# Patient Record
Sex: FEMALE | Race: BLACK OR AFRICAN AMERICAN | NOT HISPANIC OR LATINO
[De-identification: names, ages, dates, MRNs, and addresses within clinical notes are randomized per-mention and may not be internally consistent; named-entity substitution may affect disease eponyms.]

---

## 2017-10-27 ENCOUNTER — APPOINTMENT (OUTPATIENT)
Dept: OTHER | Facility: CLINIC | Age: 67
End: 2017-10-27
Payer: COMMERCIAL

## 2017-10-27 VITALS
BODY MASS INDEX: 40.02 KG/M2 | DIASTOLIC BLOOD PRESSURE: 71 MMHG | HEART RATE: 61 BPM | WEIGHT: 255 LBS | RESPIRATION RATE: 15 BRPM | SYSTOLIC BLOOD PRESSURE: 131 MMHG | HEIGHT: 67 IN | OXYGEN SATURATION: 99 %

## 2017-10-27 DIAGNOSIS — Z86.79 PERSONAL HISTORY OF OTHER DISEASES OF THE CIRCULATORY SYSTEM: ICD-10-CM

## 2017-10-27 DIAGNOSIS — N18.3 CHRONIC KIDNEY DISEASE, STAGE 3 (MODERATE): ICD-10-CM

## 2017-10-27 DIAGNOSIS — Z80.3 FAMILY HISTORY OF MALIGNANT NEOPLASM OF BREAST: ICD-10-CM

## 2017-10-27 DIAGNOSIS — Z82.49 FAMILY HISTORY OF ISCHEMIC HEART DISEASE AND OTHER DISEASES OF THE CIRCULATORY SYSTEM: ICD-10-CM

## 2017-10-27 DIAGNOSIS — Z80.6 FAMILY HISTORY OF LEUKEMIA: ICD-10-CM

## 2017-10-27 DIAGNOSIS — M51.9 UNSPECIFIED THORACIC, THORACOLUMBAR AND LUMBOSACRAL INTERVERTEBRAL DISC DISORDER: ICD-10-CM

## 2017-10-27 DIAGNOSIS — Z82.3 FAMILY HISTORY OF STROKE: ICD-10-CM

## 2017-10-27 DIAGNOSIS — Z78.9 OTHER SPECIFIED HEALTH STATUS: ICD-10-CM

## 2017-10-27 PROCEDURE — 94010 BREATHING CAPACITY TEST: CPT

## 2017-10-27 PROCEDURE — 96150: CPT

## 2017-10-27 PROCEDURE — 99397 PER PM REEVAL EST PAT 65+ YR: CPT

## 2017-10-30 LAB
ALBUMIN SERPL ELPH-MCNC: 4.1 G/DL
ALP BLD-CCNC: 100 U/L
ALT SERPL-CCNC: 14 U/L
ANION GAP SERPL CALC-SCNC: 16 MMOL/L
APPEARANCE: CLEAR
AST SERPL-CCNC: 16 U/L
BASOPHILS # BLD AUTO: 0.02 K/UL
BASOPHILS NFR BLD AUTO: 0.2 %
BILIRUB SERPL-MCNC: 0.4 MG/DL
BILIRUBIN URINE: NEGATIVE
BLOOD URINE: NEGATIVE
BUN SERPL-MCNC: 15 MG/DL
CALCIUM SERPL-MCNC: 10 MG/DL
CHLORIDE SERPL-SCNC: 103 MMOL/L
CHOLEST SERPL-MCNC: 152 MG/DL
CHOLEST/HDLC SERPL: 2.8 RATIO
CO2 SERPL-SCNC: 25 MMOL/L
COLOR: YELLOW
CREAT SERPL-MCNC: 1.08 MG/DL
EOSINOPHIL # BLD AUTO: 0.31 K/UL
EOSINOPHIL NFR BLD AUTO: 3.8 %
GLUCOSE QUALITATIVE U: NEGATIVE MG/DL
GLUCOSE SERPL-MCNC: 86 MG/DL
HCT VFR BLD CALC: 36.9 %
HDLC SERPL-MCNC: 54 MG/DL
HGB BLD-MCNC: 11.8 G/DL
IMM GRANULOCYTES NFR BLD AUTO: 0.2 %
KETONES URINE: NEGATIVE
LDLC SERPL CALC-MCNC: 81 MG/DL
LEUKOCYTE ESTERASE URINE: ABNORMAL
LYMPHOCYTES # BLD AUTO: 2.02 K/UL
LYMPHOCYTES NFR BLD AUTO: 25 %
MAN DIFF?: NORMAL
MCHC RBC-ENTMCNC: 27.9 PG
MCHC RBC-ENTMCNC: 32 GM/DL
MCV RBC AUTO: 87.2 FL
MONOCYTES # BLD AUTO: 0.44 K/UL
MONOCYTES NFR BLD AUTO: 5.4 %
NEUTROPHILS # BLD AUTO: 5.27 K/UL
NEUTROPHILS NFR BLD AUTO: 65.4 %
NITRITE URINE: NEGATIVE
PH URINE: 5.5
PLATELET # BLD AUTO: 285 K/UL
POTASSIUM SERPL-SCNC: 4.1 MMOL/L
PROT SERPL-MCNC: 7.4 G/DL
PROTEIN URINE: NEGATIVE MG/DL
RBC # BLD: 4.23 M/UL
RBC # FLD: 14.5 %
SODIUM SERPL-SCNC: 144 MMOL/L
SPECIFIC GRAVITY URINE: 1.02
TRIGL SERPL-MCNC: 87 MG/DL
UROBILINOGEN URINE: NEGATIVE MG/DL
WBC # FLD AUTO: 8.08 K/UL

## 2018-01-02 ENCOUNTER — APPOINTMENT (OUTPATIENT)
Dept: ENDOCRINOLOGY | Facility: CLINIC | Age: 68
End: 2018-01-02

## 2018-01-16 ENCOUNTER — APPOINTMENT (OUTPATIENT)
Dept: ENDOCRINOLOGY | Facility: CLINIC | Age: 68
End: 2018-01-16
Payer: MEDICARE

## 2018-01-16 VITALS
WEIGHT: 261 LBS | SYSTOLIC BLOOD PRESSURE: 117 MMHG | DIASTOLIC BLOOD PRESSURE: 61 MMHG | HEART RATE: 60 BPM | BODY MASS INDEX: 41.95 KG/M2 | HEIGHT: 66 IN

## 2018-01-16 PROCEDURE — 99202 OFFICE O/P NEW SF 15 MIN: CPT

## 2018-01-25 LAB
24R-OH-CALCIDIOL SERPL-MCNC: 10.8 PG/ML
25(OH)D3 SERPL-MCNC: 35 NG/ML
ALBUMIN SERPL ELPH-MCNC: 4 G/DL
ALP BLD-CCNC: 98 U/L
ALT SERPL-CCNC: 9 U/L
ANION GAP SERPL CALC-SCNC: 17 MMOL/L
AST SERPL-CCNC: 14 U/L
BASOPHILS # BLD AUTO: 0.02 K/UL
BASOPHILS NFR BLD AUTO: 0.2 %
BILIRUB SERPL-MCNC: 0.4 MG/DL
BUN SERPL-MCNC: 17 MG/DL
CALCIUM SERPL-MCNC: 10 MG/DL
CALCIUM SERPL-MCNC: 10 MG/DL
CHLORIDE SERPL-SCNC: 101 MMOL/L
CHOLEST SERPL-MCNC: 141 MG/DL
CHOLEST/HDLC SERPL: 2.5 RATIO
CO2 SERPL-SCNC: 24 MMOL/L
CREAT SERPL-MCNC: 1.19 MG/DL
EOSINOPHIL # BLD AUTO: 0.24 K/UL
EOSINOPHIL NFR BLD AUTO: 2.7 %
GLUCOSE SERPL-MCNC: 94 MG/DL
HBA1C MFR BLD HPLC: 5.4 %
HCT VFR BLD CALC: 37.6 %
HDLC SERPL-MCNC: 57 MG/DL
HGB BLD-MCNC: 11.9 G/DL
IMM GRANULOCYTES NFR BLD AUTO: 0.1 %
LDLC SERPL CALC-MCNC: 70 MG/DL
LYMPHOCYTES # BLD AUTO: 1.69 K/UL
LYMPHOCYTES NFR BLD AUTO: 19.2 %
MAN DIFF?: NORMAL
MCHC RBC-ENTMCNC: 27.9 PG
MCHC RBC-ENTMCNC: 31.6 GM/DL
MCV RBC AUTO: 88.3 FL
MONOCYTES # BLD AUTO: 0.53 K/UL
MONOCYTES NFR BLD AUTO: 6 %
NEUTROPHILS # BLD AUTO: 6.3 K/UL
NEUTROPHILS NFR BLD AUTO: 71.8 %
PARATHYROID HORMONE INTACT: 65 PG/ML
PLATELET # BLD AUTO: 294 K/UL
POTASSIUM SERPL-SCNC: 4.2 MMOL/L
PROT SERPL-MCNC: 7.3 G/DL
RBC # BLD: 4.26 M/UL
RBC # FLD: 14.5 %
SODIUM SERPL-SCNC: 142 MMOL/L
T3FREE SERPL-MCNC: 2.67 PG/ML
T4 FREE SERPL-MCNC: 1.6 NG/DL
TRIGL SERPL-MCNC: 72 MG/DL
WBC # FLD AUTO: 8.79 K/UL

## 2018-10-29 ENCOUNTER — RESULT CHARGE (OUTPATIENT)
Age: 68
End: 2018-10-29

## 2018-10-30 ENCOUNTER — APPOINTMENT (OUTPATIENT)
Dept: OTHER | Facility: CLINIC | Age: 68
End: 2018-10-30
Payer: COMMERCIAL

## 2018-10-30 VITALS
HEART RATE: 57 BPM | HEIGHT: 66 IN | SYSTOLIC BLOOD PRESSURE: 131 MMHG | BODY MASS INDEX: 41.95 KG/M2 | DIASTOLIC BLOOD PRESSURE: 73 MMHG | RESPIRATION RATE: 16 BRPM | WEIGHT: 261 LBS

## 2018-10-30 PROCEDURE — 96150: CPT

## 2018-10-30 PROCEDURE — 99396 PREV VISIT EST AGE 40-64: CPT

## 2018-10-30 PROCEDURE — 94010 BREATHING CAPACITY TEST: CPT

## 2018-10-31 LAB
ALBUMIN SERPL ELPH-MCNC: 4.2 G/DL
ALP BLD-CCNC: 97 U/L
ALT SERPL-CCNC: 12 U/L
ANION GAP SERPL CALC-SCNC: 16 MMOL/L
APPEARANCE: ABNORMAL
AST SERPL-CCNC: 18 U/L
BACTERIA: ABNORMAL
BASOPHILS # BLD AUTO: 0.04 K/UL
BASOPHILS NFR BLD AUTO: 0.5 %
BILIRUB SERPL-MCNC: 0.4 MG/DL
BILIRUBIN URINE: NEGATIVE
BLOOD URINE: NEGATIVE
BUN SERPL-MCNC: 21 MG/DL
CALCIUM SERPL-MCNC: 9.6 MG/DL
CHLORIDE SERPL-SCNC: 102 MMOL/L
CHOLEST SERPL-MCNC: 146 MG/DL
CHOLEST/HDLC SERPL: 2.8 RATIO
CO2 SERPL-SCNC: 24 MMOL/L
COLOR: ABNORMAL
CREAT SERPL-MCNC: 1.07 MG/DL
EOSINOPHIL # BLD AUTO: 0.27 K/UL
EOSINOPHIL NFR BLD AUTO: 3.2 %
GLUCOSE QUALITATIVE U: NEGATIVE MG/DL
GLUCOSE SERPL-MCNC: 85 MG/DL
HCT VFR BLD CALC: 38.7 %
HDLC SERPL-MCNC: 53 MG/DL
HGB BLD-MCNC: 12.2 G/DL
HYALINE CASTS: 2 /LPF
IMM GRANULOCYTES NFR BLD AUTO: 0.1 %
KETONES URINE: ABNORMAL
LDLC SERPL CALC-MCNC: 75 MG/DL
LEUKOCYTE ESTERASE URINE: ABNORMAL
LYMPHOCYTES # BLD AUTO: 1.89 K/UL
LYMPHOCYTES NFR BLD AUTO: 22.4 %
MAN DIFF?: NORMAL
MCHC RBC-ENTMCNC: 28.6 PG
MCHC RBC-ENTMCNC: 31.5 GM/DL
MCV RBC AUTO: 90.6 FL
MICROSCOPIC-UA: NORMAL
MONOCYTES # BLD AUTO: 0.52 K/UL
MONOCYTES NFR BLD AUTO: 6.2 %
NEUTROPHILS # BLD AUTO: 5.72 K/UL
NEUTROPHILS NFR BLD AUTO: 67.6 %
NITRITE URINE: NEGATIVE
PH URINE: 5.5
PLATELET # BLD AUTO: 296 K/UL
POTASSIUM SERPL-SCNC: 3.9 MMOL/L
PROT SERPL-MCNC: 7.1 G/DL
PROTEIN URINE: 30 MG/DL
RBC # BLD: 4.27 M/UL
RBC # FLD: 14.6 %
RED BLOOD CELLS URINE: 3 /HPF
SODIUM SERPL-SCNC: 142 MMOL/L
SPECIFIC GRAVITY URINE: 1.02
SQUAMOUS EPITHELIAL CELLS: 8 /HPF
TRIGL SERPL-MCNC: 88 MG/DL
UROBILINOGEN URINE: NEGATIVE MG/DL
WBC # FLD AUTO: 8.45 K/UL
WHITE BLOOD CELLS URINE: 7 /HPF

## 2019-02-28 ENCOUNTER — FORM ENCOUNTER (OUTPATIENT)
Age: 69
End: 2019-02-28

## 2019-03-19 ENCOUNTER — APPOINTMENT (OUTPATIENT)
Dept: GASTROENTEROLOGY | Facility: CLINIC | Age: 69
End: 2019-03-19
Payer: COMMERCIAL

## 2019-03-19 VITALS
SYSTOLIC BLOOD PRESSURE: 138 MMHG | WEIGHT: 256 LBS | HEIGHT: 66 IN | TEMPERATURE: 98.4 F | DIASTOLIC BLOOD PRESSURE: 74 MMHG | OXYGEN SATURATION: 96 % | BODY MASS INDEX: 41.14 KG/M2 | HEART RATE: 75 BPM

## 2019-03-19 PROCEDURE — 99203 OFFICE O/P NEW LOW 30 MIN: CPT

## 2019-03-19 NOTE — PHYSICAL EXAM
[General Appearance - Alert] : alert [General Appearance - In No Acute Distress] : in no acute distress [Sclera] : the sclera and conjunctiva were normal [Extraocular Movements] : extraocular movements were intact [PERRL With Normal Accommodation] : pupils were equal in size, round, and reactive to light [Outer Ear] : the ears and nose were normal in appearance [Oropharynx] : the oropharynx was normal [Neck Appearance] : the appearance of the neck was normal [Neck Cervical Mass (___cm)] : no neck mass was observed [Jugular Venous Distention Increased] : there was no jugular-venous distention [Thyroid Diffuse Enlargement] : the thyroid was not enlarged [Thyroid Nodule] : there were no palpable thyroid nodules [Auscultation Breath Sounds / Voice Sounds] : lungs were clear to auscultation bilaterally [Heart Rate And Rhythm] : heart rate was normal and rhythm regular [Heart Sounds] : normal S1 and S2 [Heart Sounds Gallop] : no gallops [Murmurs] : no murmurs [Heart Sounds Pericardial Friction Rub] : no pericardial rub [Bowel Sounds] : normal bowel sounds [Abdomen Soft] : soft [Abdomen Tenderness] : non-tender [Abdomen Mass (___ Cm)] : no abdominal mass palpated [Cervical Lymph Nodes Enlarged Posterior Bilaterally] : posterior cervical [Cervical Lymph Nodes Enlarged Anterior Bilaterally] : anterior cervical [Supraclavicular Lymph Nodes Enlarged Bilaterally] : supraclavicular [No Spinal Tenderness] : no spinal tenderness [] : no rash [Nail Clubbing] : no clubbing  or cyanosis of the fingernails [Oriented To Time, Place, And Person] : oriented to person, place, and time [Impaired Insight] : insight and judgment were intact [Affect] : the affect was normal

## 2019-03-19 NOTE — ASSESSMENT
[FreeTextEntry1] : 68 year female presents for colorectal cancer screening. She prefer to have a cologuard test. I explained that if Cologaurd is positive then she will need a colonoscopy. \par \par Plan:\par Cologuard test \par Further management pending those results.

## 2019-03-19 NOTE — HISTORY OF PRESENT ILLNESS
[de-identified] : Referring Physician: SELF,REFERRED \par 68 year  old   female  with  a past medical history as below presents for evaluation for screening for colorectal cancer. She prefers to have a cologuard testing. She has no history of colon cancer, colitis, or polyps. No family history of colon cancer. \par ROS: She  denies headache, rash, eye pain, joint pain, weight loss, fever, chills, night sweats, chest pain, dyspnea, cough, diarrhea, constipation, melena, abdominal pain, nausea and vomiting. \par MHx: HTN , RA \par SHx:  Hysterectomy and hernia repair \par Significant Meds: Denies ASA or AC. \par Allergies: \par Family history: Denies a family history of colon CA, gastric CA, high risk polyps, Inflammatory and autoimmune disease. \par Social: Denies TOB, ETOH, IVDA. No Tattoos \par Health maintenance: -ve history of blood transfusions, \par Last colonoscopy:        3 years ago          Last EGD :     \par \par

## 2019-04-17 ENCOUNTER — OTHER (OUTPATIENT)
Age: 69
End: 2019-04-17

## 2019-07-01 ENCOUNTER — FORM ENCOUNTER (OUTPATIENT)
Age: 69
End: 2019-07-01

## 2019-08-13 ENCOUNTER — OTHER (OUTPATIENT)
Age: 69
End: 2019-08-13

## 2019-10-31 ENCOUNTER — APPOINTMENT (OUTPATIENT)
Dept: OTHER | Facility: CLINIC | Age: 69
End: 2019-10-31
Payer: COMMERCIAL

## 2019-10-31 VITALS
WEIGHT: 265 LBS | BODY MASS INDEX: 42.59 KG/M2 | SYSTOLIC BLOOD PRESSURE: 105 MMHG | DIASTOLIC BLOOD PRESSURE: 65 MMHG | HEIGHT: 66 IN | HEART RATE: 60 BPM | OXYGEN SATURATION: 100 % | RESPIRATION RATE: 16 BRPM

## 2019-10-31 DIAGNOSIS — Z28.21 IMMUNIZATION NOT CARRIED OUT BECAUSE OF PATIENT REFUSAL: ICD-10-CM

## 2019-10-31 DIAGNOSIS — Z03.89 ENCOUNTER FOR OBSERVATION FOR OTHER SUSPECTED DISEASES AND CONDITIONS RULED OUT: ICD-10-CM

## 2019-10-31 PROCEDURE — 94010 BREATHING CAPACITY TEST: CPT

## 2019-10-31 PROCEDURE — 99396 PREV VISIT EST AGE 40-64: CPT | Mod: 25

## 2019-11-04 LAB
APPEARANCE: ABNORMAL
BACTERIA: ABNORMAL
BILIRUBIN URINE: NEGATIVE
BLOOD URINE: NEGATIVE
COLOR: NORMAL
GLUCOSE QUALITATIVE U: NEGATIVE
HYALINE CASTS: 2 /LPF
KETONES URINE: NEGATIVE
LEUKOCYTE ESTERASE URINE: NEGATIVE
MICROSCOPIC-UA: NORMAL
NITRITE URINE: NEGATIVE
PH URINE: 6.5
PROTEIN URINE: NEGATIVE
RED BLOOD CELLS URINE: 1 /HPF
SPECIFIC GRAVITY URINE: 1.01
SQUAMOUS EPITHELIAL CELLS: 1 /HPF
UROBILINOGEN URINE: NORMAL
WHITE BLOOD CELLS URINE: 1 /HPF

## 2019-11-05 LAB
ALBUMIN SERPL ELPH-MCNC: 3.9 G/DL
ALP BLD-CCNC: 98 U/L
ALT SERPL-CCNC: 15 U/L
ANION GAP SERPL CALC-SCNC: 13 MMOL/L
AST SERPL-CCNC: 17 U/L
BASOPHILS # BLD AUTO: 0.06 K/UL
BASOPHILS NFR BLD AUTO: 0.8 %
BILIRUB SERPL-MCNC: 0.4 MG/DL
BUN SERPL-MCNC: 22 MG/DL
CALCIUM SERPL-MCNC: 9.7 MG/DL
CHLORIDE SERPL-SCNC: 106 MMOL/L
CHOLEST SERPL-MCNC: 139 MG/DL
CHOLEST/HDLC SERPL: 2.8 RATIO
CO2 SERPL-SCNC: 24 MMOL/L
CREAT SERPL-MCNC: 1.24 MG/DL
EOSINOPHIL # BLD AUTO: 0.24 K/UL
EOSINOPHIL NFR BLD AUTO: 3.2 %
GLUCOSE SERPL-MCNC: 92 MG/DL
HCT VFR BLD CALC: 37.8 %
HDLC SERPL-MCNC: 50 MG/DL
HGB BLD-MCNC: 11.8 G/DL
IMM GRANULOCYTES NFR BLD AUTO: 0.3 %
LDLC SERPL CALC-MCNC: 71 MG/DL
LYMPHOCYTES # BLD AUTO: 1.6 K/UL
LYMPHOCYTES NFR BLD AUTO: 21.6 %
MAN DIFF?: NORMAL
MCHC RBC-ENTMCNC: 28.8 PG
MCHC RBC-ENTMCNC: 31.2 GM/DL
MCV RBC AUTO: 92.2 FL
MONOCYTES # BLD AUTO: 0.57 K/UL
MONOCYTES NFR BLD AUTO: 7.7 %
NEUTROPHILS # BLD AUTO: 4.91 K/UL
NEUTROPHILS NFR BLD AUTO: 66.4 %
PLATELET # BLD AUTO: 253 K/UL
POTASSIUM SERPL-SCNC: 3.9 MMOL/L
PROT SERPL-MCNC: 6.8 G/DL
RBC # BLD: 4.1 M/UL
RBC # FLD: 14.3 %
SODIUM SERPL-SCNC: 143 MMOL/L
TRIGL SERPL-MCNC: 91 MG/DL
WBC # FLD AUTO: 7.4 K/UL

## 2019-11-27 PROBLEM — Z28.21 INFLUENZA VACCINATION DECLINED: Status: ACTIVE | Noted: 2018-10-30

## 2020-01-07 ENCOUNTER — APPOINTMENT (OUTPATIENT)
Dept: GASTROENTEROLOGY | Facility: CLINIC | Age: 70
End: 2020-01-07

## 2020-01-13 ENCOUNTER — APPOINTMENT (OUTPATIENT)
Dept: GASTROENTEROLOGY | Facility: CLINIC | Age: 70
End: 2020-01-13

## 2020-01-15 ENCOUNTER — EMERGENCY (EMERGENCY)
Facility: HOSPITAL | Age: 70
LOS: 1 days | Discharge: HOME | End: 2020-01-15
Attending: EMERGENCY MEDICINE | Admitting: EMERGENCY MEDICINE
Payer: MEDICARE

## 2020-01-15 VITALS
SYSTOLIC BLOOD PRESSURE: 109 MMHG | RESPIRATION RATE: 16 BRPM | OXYGEN SATURATION: 100 % | HEART RATE: 66 BPM | TEMPERATURE: 98 F | DIASTOLIC BLOOD PRESSURE: 49 MMHG

## 2020-01-15 VITALS
HEART RATE: 66 BPM | OXYGEN SATURATION: 100 % | SYSTOLIC BLOOD PRESSURE: 154 MMHG | DIASTOLIC BLOOD PRESSURE: 67 MMHG | TEMPERATURE: 98 F | RESPIRATION RATE: 20 BRPM

## 2020-01-15 PROCEDURE — 73502 X-RAY EXAM HIP UNI 2-3 VIEWS: CPT | Mod: 26,RT

## 2020-01-15 PROCEDURE — 99283 EMERGENCY DEPT VISIT LOW MDM: CPT | Mod: GC

## 2020-01-15 RX ORDER — OXYCODONE HYDROCHLORIDE 5 MG/1
1 TABLET ORAL
Qty: 8 | Refills: 0
Start: 2020-01-15 | End: 2020-01-18

## 2020-01-15 RX ORDER — OXYCODONE HYDROCHLORIDE 5 MG/1
5 TABLET ORAL ONCE
Refills: 0 | Status: DISCONTINUED | OUTPATIENT
Start: 2020-01-15 | End: 2020-01-15

## 2020-01-15 RX ORDER — ACETAMINOPHEN 500 MG
975 TABLET ORAL ONCE
Refills: 0 | Status: COMPLETED | OUTPATIENT
Start: 2020-01-15 | End: 2020-01-15

## 2020-01-15 RX ADMIN — Medication 975 MILLIGRAM(S): at 03:20

## 2020-01-15 RX ADMIN — OXYCODONE HYDROCHLORIDE 5 MILLIGRAM(S): 5 TABLET ORAL at 04:47

## 2020-01-15 RX ADMIN — OXYCODONE HYDROCHLORIDE 5 MILLIGRAM(S): 5 TABLET ORAL at 04:20

## 2020-01-15 RX ADMIN — Medication 975 MILLIGRAM(S): at 04:15

## 2020-01-15 NOTE — ED PROVIDER NOTE - PHYSICAL EXAMINATION
Dianna Ramirez, .:   GENERAL: Patient awake alert NAD.  HEENT: NC/AT, Moist mucous membranes, PERRL, EOMI.  LUNGS: CTAB, no wheezes or crackles.   CARDIAC: RRR, no m/r/g.    ABDOMEN: Soft, NT, ND, No rebound, guarding. No CVA tenderness.   EXT: No edema. No calf tenderness. Full ROM of R hip, though tender with ROM. Tender to palpation of the hip area, no bruising or rash noted.   MSK: No spinal tenderness, no pain with movement, no deformities.  NEURO: A&Ox3. Moving all extremities.  SKIN: Warm and dry. No rash.  PSYCH: Normal affect.

## 2020-01-15 NOTE — ED ADULT NURSE NOTE - OBJECTIVE STATEMENT
Patient is a 69y female complaining of atraumatic right sided hip pain x 3 days, described as sharp, and radiating from hip and down to right knee.   Bed in lowest locked position. Call bell in reach.

## 2020-01-15 NOTE — ED PROVIDER NOTE - OBJECTIVE STATEMENT
69F pmhx of HTN, CKD, RA presents with 3 days of worsening right hip pain with radiation to the knee, seen at urgent care and told to follow up with rheumatologist. Tried her RA meds, meloxicam but it did not help. Did not try any other meds. Noted some vomiting with the pain, but no fevers, chills, diarrhea. Denies any trauma to the area. Has pain with walking.

## 2020-01-15 NOTE — ED PROVIDER NOTE - ATTENDING CONTRIBUTION TO CARE
MD Souza:  I performed a face to face bedside interview with patient regarding history of present illness, review of symptoms and past medical history. I completed an independent physical exam(documented below).  I have discussed patient's plan of care with resident.   I agree with note as stated above, having amended the EMR as needed to reflect my findings. I have discussed the assessment and plan of care.  This includes during the time I functioned as the attending physician for this patient.  PE:  (my exam performed after pt rec'd pain meds)  Gen: Alert, NAD  Head: NC, AT,  EOMI, normal lids/conjunctiva  ENT:  normal hearing, patent oropharynx without erythema/exudate  Neck: +supple, no tenderness/meningismus/JVD, +Trachea midline  Chest: no chest wall tenderness, equal chest rise  Pulm: Bilateral BS, normal resp effort, no wheeze/stridor/retractions  CV: RRR, no M/R/G, +dist pulses  Abd: +BS, soft, NT/ND  Rectal: deferred  Mskel: no edema/erythema/cyanosis  Skin: no rash  Neuro: AAOx3  MDM:  70yo F w/ pmh of htn, CKD, RA c/o acute worsening of chronic R hip pain, not improved w/ meloxicam. Normally ambulates w/ walker but having difficulty with this lately. Denies fever, chills. Able to range hip. Xrays, meds, reassess.

## 2020-01-15 NOTE — ED PROVIDER NOTE - PATIENT PORTAL LINK FT
You can access the FollowMyHealth Patient Portal offered by [facility] by registering at the following website: [email or web address]. By joining [employer]’s FollowMyHealth portal, you will also be able to view your health information using other applications (apps) compatible with our system.

## 2020-01-15 NOTE — ED PROVIDER NOTE - NSFOLLOWUPINSTRUCTIONS_ED_ALL_ED_FT
Rest, drink plenty of fluids.  Advance activity as tolerated.  Continue all previously prescribed medications as directed.  Follow up with your primary care physician in 48-72 hours- bring copies of your results.  Return to the ER for worsening or persistent symptoms, and/or ANY NEW OR CONCERNING SYMPTOMS. If you have issues obtaining follow up, please call: [phone] to obtain a doctor or specialist who takes your insurance in your area.    - Follow up with the PM&R doctor in the next few days

## 2020-01-15 NOTE — ED PROVIDER NOTE - CLINICAL SUMMARY MEDICAL DECISION MAKING FREE TEXT BOX
69F p/w R hip pain. No trauma to the area, well appearing. No leg shortening. Likely OA vs. RA. Will get xrays to r/o pathological fx, though unlikely. pain control (avoid NSAIDS due to CKD) and dc home with rheum f/u.

## 2020-01-15 NOTE — ED ADULT TRIAGE NOTE - CHIEF COMPLAINT QUOTE
Pt. brought to [facility] ED by [facility] ambulance. Pt. has right sided body pain since Saturday. Saw [facility] and MD attributed to chronic arthritis. Pt., also, has history of CKD. Current c/o right hip pain radiating to right knee. NAD noted. Pt. appears comfortable.

## 2020-01-15 NOTE — ED PROVIDER NOTE - NS ED ROS FT
CONST: no fevers, no chills  EYES: no pain, no vision changes  ENT: no sore throat, no ear pain, no change in hearing  CV: no chest pain, no leg swelling  RESP: no shortness of breath, no cough  ABD: no abdominal pain, no nausea, no vomiting, no diarrhea  : no dysuria, no flank pain, no hematuria  MSK: no back pain, +R hip pain  NEURO: no headache or additional neurologic complaints  HEME: no easy bleeding  SKIN:  no rash

## 2020-01-15 NOTE — ED ADULT NURSE REASSESSMENT NOTE - GENERAL PATIENT STATE
Respirations even and unlabored. A&Ox4./comfortable appearance/family/SO at bedside/smiling/interactive

## 2020-01-15 NOTE — ED POST DISCHARGE NOTE - ADDITIONAL DOCUMENTATION
daughter called back because prescription did not go through- resubmitted same prescription that Dr. Ramirez ordered under Dr. Allison

## 2020-02-17 ENCOUNTER — FORM ENCOUNTER (OUTPATIENT)
Age: 70
End: 2020-02-17

## 2020-05-27 ENCOUNTER — FORM ENCOUNTER (OUTPATIENT)
Age: 70
End: 2020-05-27

## 2020-09-10 PROBLEM — I10 ESSENTIAL (PRIMARY) HYPERTENSION: Chronic | Status: ACTIVE | Noted: 2020-01-15

## 2020-09-10 PROBLEM — N18.9 CHRONIC KIDNEY DISEASE, UNSPECIFIED: Chronic | Status: ACTIVE | Noted: 2020-01-15

## 2020-10-08 ENCOUNTER — APPOINTMENT (OUTPATIENT)
Dept: OTHER | Facility: CLINIC | Age: 70
End: 2020-10-08

## 2020-10-29 ENCOUNTER — APPOINTMENT (OUTPATIENT)
Dept: OTHER | Facility: CLINIC | Age: 70
End: 2020-10-29
Payer: COMMERCIAL

## 2020-10-29 PROCEDURE — 99396 PREV VISIT EST AGE 40-64: CPT | Mod: 95

## 2020-10-29 RX ORDER — MELOXICAM 15 MG/1
TABLET ORAL
Refills: 0 | Status: COMPLETED | COMMUNITY
End: 2020-10-29

## 2020-12-02 NOTE — ED ADULT NURSE NOTE - NSSEPSISSUSPECTED_ED_A_ED
[FreeTextEntry1] : VN reports that wound of right leg has closed\par Moisturizes daily - additional products advised other than coca butter currently in use\par VN will measure for a new compression stocking\par Is compliant with diuretic\par \par 11/20/2020\par New blister appeared on leg 11/16/2020. Patient states she has been unable to wear her compression stockings as she is unable to put them on by herself. \par Patient underwent evaluation for peripheral arterial disease using a [employer] diagnostic machine.  Ankle brachial index was obtained using blood pressure cuffs of the ankle and brachial segments of both legs.  A distal pulse volume recording was noted digitally on the device.  The procedure was supervised and interpreted by the physician.  EVERETTE of the right lower extremity PAD.   EVERETTE of the left lower extremity 0.87.  Waveform noted to be  biphasic.   Patient tolerated procedure well in the office.  Results discussed with the patient.\par \par \par 
No

## 2021-01-06 ENCOUNTER — APPOINTMENT (OUTPATIENT)
Dept: CARDIOLOGY | Facility: CLINIC | Age: 71
End: 2021-01-06

## 2021-01-08 ENCOUNTER — LABORATORY RESULT (OUTPATIENT)
Age: 71
End: 2021-01-08

## 2021-01-08 ENCOUNTER — NON-APPOINTMENT (OUTPATIENT)
Age: 71
End: 2021-01-08

## 2021-01-08 ENCOUNTER — APPOINTMENT (OUTPATIENT)
Dept: CARDIOLOGY | Facility: CLINIC | Age: 71
End: 2021-01-08
Payer: MEDICARE

## 2021-01-08 ENCOUNTER — APPOINTMENT (OUTPATIENT)
Dept: PULMONOLOGY | Facility: CLINIC | Age: 71
End: 2021-01-08
Payer: MEDICARE

## 2021-01-08 VITALS
WEIGHT: 270 LBS | HEIGHT: 66 IN | HEART RATE: 71 BPM | BODY MASS INDEX: 43.39 KG/M2 | SYSTOLIC BLOOD PRESSURE: 118 MMHG | OXYGEN SATURATION: 97 % | TEMPERATURE: 97.5 F | DIASTOLIC BLOOD PRESSURE: 60 MMHG

## 2021-01-08 DIAGNOSIS — Z00.00 ENCOUNTER FOR GENERAL ADULT MEDICAL EXAMINATION W/OUT ABNORMAL FINDINGS: ICD-10-CM

## 2021-01-08 PROCEDURE — 90670 PCV13 VACCINE IM: CPT

## 2021-01-08 PROCEDURE — 90471 IMMUNIZATION ADMIN: CPT

## 2021-01-08 PROCEDURE — 71046 X-RAY EXAM CHEST 2 VIEWS: CPT

## 2021-01-08 PROCEDURE — 99072 ADDL SUPL MATRL&STAF TM PHE: CPT

## 2021-01-08 PROCEDURE — 99397 PER PM REEVAL EST PAT 65+ YR: CPT | Mod: 25

## 2021-01-08 PROCEDURE — 93000 ELECTROCARDIOGRAM COMPLETE: CPT

## 2021-01-08 RX ORDER — METOPROLOL TARTRATE 100 MG/1
100 TABLET, FILM COATED ORAL
Refills: 0 | Status: DISCONTINUED | COMMUNITY
End: 2021-01-08

## 2021-01-08 NOTE — HISTORY OF PRESENT ILLNESS
[FreeTextEntry1] : Annual wellness Exam and establish Care at our office.  [de-identified] : This is a 70 year old lady with a PMH of HTN, HLD, CKD, Vascular Insufficiency, Obesity, R.A and Vitamin D Deficiency presents today for her annual wellness exam and to establish care at our office. Patient states that she is feeling good and has no complaints at this time. Patient states she is due for imaging. Patient denies , palpitations, chest pain, nausea, vomiting, dizziness and lightheadedness.pt with baseline pains from rheumatoid arthritis and exertional dyspnea \par

## 2021-01-09 ENCOUNTER — TRANSCRIPTION ENCOUNTER (OUTPATIENT)
Age: 71
End: 2021-01-09

## 2021-01-20 ENCOUNTER — APPOINTMENT (OUTPATIENT)
Dept: CARDIOLOGY | Facility: CLINIC | Age: 71
End: 2021-01-20
Payer: MEDICARE

## 2021-01-20 ENCOUNTER — NON-APPOINTMENT (OUTPATIENT)
Age: 71
End: 2021-01-20

## 2021-01-20 PROCEDURE — 93015 CV STRESS TEST SUPVJ I&R: CPT

## 2021-01-20 PROCEDURE — 78452 HT MUSCLE IMAGE SPECT MULT: CPT

## 2021-01-20 PROCEDURE — 93306 TTE W/DOPPLER COMPLETE: CPT

## 2021-01-20 PROCEDURE — 99072 ADDL SUPL MATRL&STAF TM PHE: CPT

## 2021-01-20 PROCEDURE — A9500: CPT

## 2021-01-20 RX ORDER — REGADENOSON 0.08 MG/ML
0.4 INJECTION, SOLUTION INTRAVENOUS
Qty: 1 | Refills: 0 | Status: COMPLETED | OUTPATIENT
Start: 2021-01-20

## 2021-01-20 RX ADMIN — REGADENOSON 5 MG/5ML: 0.08 INJECTION, SOLUTION INTRAVENOUS at 00:00

## 2021-01-21 ENCOUNTER — NON-APPOINTMENT (OUTPATIENT)
Age: 71
End: 2021-01-21

## 2021-02-03 ENCOUNTER — OUTPATIENT (OUTPATIENT)
Dept: OUTPATIENT SERVICES | Facility: HOSPITAL | Age: 71
LOS: 1 days | End: 2021-02-03
Payer: COMMERCIAL

## 2021-02-03 ENCOUNTER — RESULT REVIEW (OUTPATIENT)
Age: 71
End: 2021-02-03

## 2021-02-03 ENCOUNTER — APPOINTMENT (OUTPATIENT)
Dept: CT IMAGING | Facility: CLINIC | Age: 71
End: 2021-02-03
Payer: MEDICARE

## 2021-02-03 DIAGNOSIS — R94.39 ABNORMAL RESULT OF OTHER CARDIOVASCULAR FUNCTION STUDY: ICD-10-CM

## 2021-02-03 PROCEDURE — 75574 CT ANGIO HRT W/3D IMAGE: CPT | Mod: 26

## 2021-02-03 PROCEDURE — 75574 CT ANGIO HRT W/3D IMAGE: CPT

## 2021-02-10 ENCOUNTER — APPOINTMENT (OUTPATIENT)
Dept: CARDIOLOGY | Facility: CLINIC | Age: 71
End: 2021-02-10
Payer: MEDICARE

## 2021-02-10 VITALS
DIASTOLIC BLOOD PRESSURE: 70 MMHG | HEIGHT: 66 IN | WEIGHT: 272 LBS | BODY MASS INDEX: 43.71 KG/M2 | SYSTOLIC BLOOD PRESSURE: 106 MMHG | TEMPERATURE: 97.7 F | OXYGEN SATURATION: 98 % | HEART RATE: 58 BPM

## 2021-02-10 LAB
25(OH)D3 SERPL-MCNC: 56.8 NG/ML
ALBUMIN SERPL ELPH-MCNC: 4.2 G/DL
ALP BLD-CCNC: 108 U/L
ALT SERPL-CCNC: 17 U/L
ANION GAP SERPL CALC-SCNC: 13 MMOL/L
AST SERPL-CCNC: 18 U/L
BASOPHILS # BLD AUTO: 0.04 K/UL
BASOPHILS NFR BLD AUTO: 0.4 %
BILIRUB SERPL-MCNC: 0.4 MG/DL
BUN SERPL-MCNC: 17 MG/DL
CALCIUM SERPL-MCNC: 10.1 MG/DL
CHLORIDE SERPL-SCNC: 105 MMOL/L
CHOLEST SERPL-MCNC: 157 MG/DL
CK SERPL-CCNC: 80 U/L
CO2 SERPL-SCNC: 25 MMOL/L
CREAT SERPL-MCNC: 1.12 MG/DL
EOSINOPHIL # BLD AUTO: 0.23 K/UL
EOSINOPHIL NFR BLD AUTO: 2.3 %
ESTIMATED AVERAGE GLUCOSE: 111 MG/DL
GLUCOSE SERPL-MCNC: 95 MG/DL
HBA1C MFR BLD HPLC: 5.5 %
HCT VFR BLD CALC: 40.2 %
HDLC SERPL-MCNC: 57 MG/DL
HGB BLD-MCNC: 12.7 G/DL
IMM GRANULOCYTES NFR BLD AUTO: 0.1 %
LDLC SERPL CALC-MCNC: 85 MG/DL
LYMPHOCYTES # BLD AUTO: 1.73 K/UL
LYMPHOCYTES NFR BLD AUTO: 17.7 %
MAGNESIUM SERPL-MCNC: 2 MG/DL
MAN DIFF?: NORMAL
MCHC RBC-ENTMCNC: 30.2 PG
MCHC RBC-ENTMCNC: 31.6 GM/DL
MCV RBC AUTO: 95.7 FL
MONOCYTES # BLD AUTO: 0.7 K/UL
MONOCYTES NFR BLD AUTO: 7.2 %
NEUTROPHILS # BLD AUTO: 7.08 K/UL
NEUTROPHILS NFR BLD AUTO: 72.3 %
NONHDLC SERPL-MCNC: 100 MG/DL
PHOSPHATE SERPL-MCNC: 3.1 MG/DL
PLATELET # BLD AUTO: 257 K/UL
POTASSIUM SERPL-SCNC: 4 MMOL/L
PROT SERPL-MCNC: 7.6 G/DL
RBC # BLD: 4.2 M/UL
RBC # FLD: 13.9 %
SARS-COV-2 IGG SERPL IA-ACNC: 0.06 INDEX
SARS-COV-2 IGG SERPL QL IA: NEGATIVE
SODIUM SERPL-SCNC: 144 MMOL/L
T3RU NFR SERPL: 1 TBI
T4 FREE SERPL-MCNC: 1.4 NG/DL
T4 SERPL-MCNC: 9.4 UG/DL
TRIGL SERPL-MCNC: 74 MG/DL
TSH SERPL-ACNC: 1.34 UIU/ML
URATE SERPL-MCNC: 3.3 MG/DL
WBC # FLD AUTO: 9.79 K/UL

## 2021-02-10 PROCEDURE — 99072 ADDL SUPL MATRL&STAF TM PHE: CPT

## 2021-02-10 PROCEDURE — 99213 OFFICE O/P EST LOW 20 MIN: CPT

## 2021-02-10 NOTE — PHYSICAL EXAM
[General Appearance - Well Developed] : well developed [Normal Appearance] : normal appearance [Well Groomed] : well groomed [General Appearance - Well Nourished] : well nourished [No Deformities] : no deformities [General Appearance - In No Acute Distress] : no acute distress [Normal Conjunctiva] : the conjunctiva exhibited no abnormalities [Eyelids - No Xanthelasma] : the eyelids demonstrated no xanthelasmas [Normal Oral Mucosa] : normal oral mucosa [No Oral Pallor] : no oral pallor [No Oral Cyanosis] : no oral cyanosis [Normal Jugular Venous A Waves Present] : normal jugular venous A waves present [Normal Jugular Venous V Waves Present] : normal jugular venous V waves present [No Jugular Venous Joe A Waves] : no jugular venous joe A waves [Respiration, Rhythm And Depth] : normal respiratory rhythm and effort [Exaggerated Use Of Accessory Muscles For Inspiration] : no accessory muscle use [Auscultation Breath Sounds / Voice Sounds] : lungs were clear to auscultation bilaterally [Heart Rate And Rhythm] : heart rate and rhythm were normal [Heart Sounds] : normal S1 and S2 [Murmurs] : no murmurs present [Abdomen Soft] : soft [Abdomen Tenderness] : non-tender [Abdomen Mass (___ Cm)] : no abdominal mass palpated [Abnormal Walk] : normal gait [Nail Clubbing] : no clubbing of the fingernails [Gait - Sufficient For Exercise Testing] : the gait was sufficient for exercise testing [Cyanosis, Localized] : no localized cyanosis [Petechial Hemorrhages (___cm)] : no petechial hemorrhages [Skin Color & Pigmentation] : normal skin color and pigmentation [] : no rash [No Venous Stasis] : no venous stasis [Skin Lesions] : no skin lesions [No Skin Ulcers] : no skin ulcer [No Xanthoma] : no  xanthoma was observed [Oriented To Time, Place, And Person] : oriented to person, place, and time [Affect] : the affect was normal [Mood] : the mood was normal [No Anxiety] : not feeling anxious

## 2021-02-10 NOTE — HISTORY OF PRESENT ILLNESS
[FreeTextEntry1] : This  is a 70 year old lady with a PMH of HTN, HLD, CKD, Vascular Insufficiency, Obesity, R.A and Vitamin Deficiency presents today for follow up after recent visit on January 8, 2021. Patient s/p abnormal Nuclear Stress test, however, CTCA was completed which was unremarkable completely normal normal . Patient states that she is no longer having worsening dyspnea, s/p ECHO and Chest XRAY which were normal. Patient's blood work was reviewed was reviewed and was unremarkable. Patient denies palpitations, chest pain, nausea, vomiting, dizziness and lightheadedness.\par  Adequate: hears normal conversation without difficulty

## 2021-02-15 LAB
ANION GAP SERPL CALC-SCNC: 14 MMOL/L
BUN SERPL-MCNC: 17 MG/DL
CALCIUM SERPL-MCNC: 9.9 MG/DL
CHLORIDE SERPL-SCNC: 103 MMOL/L
CO2 SERPL-SCNC: 24 MMOL/L
CREAT SERPL-MCNC: 1.08 MG/DL
GLUCOSE SERPL-MCNC: 102 MG/DL
POTASSIUM SERPL-SCNC: 4.1 MMOL/L
SODIUM SERPL-SCNC: 141 MMOL/L

## 2021-06-05 ENCOUNTER — RX RENEWAL (OUTPATIENT)
Age: 71
End: 2021-06-05

## 2021-06-13 ENCOUNTER — FORM ENCOUNTER (OUTPATIENT)
Age: 71
End: 2021-06-13

## 2021-06-21 ENCOUNTER — LABORATORY RESULT (OUTPATIENT)
Age: 71
End: 2021-06-21

## 2021-06-21 ENCOUNTER — APPOINTMENT (OUTPATIENT)
Dept: CARDIOLOGY | Facility: CLINIC | Age: 71
End: 2021-06-21
Payer: MEDICARE

## 2021-06-21 VITALS
HEART RATE: 69 BPM | DIASTOLIC BLOOD PRESSURE: 66 MMHG | BODY MASS INDEX: 42.91 KG/M2 | TEMPERATURE: 97.7 F | HEIGHT: 66 IN | OXYGEN SATURATION: 95 % | WEIGHT: 267 LBS | SYSTOLIC BLOOD PRESSURE: 118 MMHG

## 2021-06-21 DIAGNOSIS — R94.39 ABNORMAL RESULT OF OTHER CARDIOVASCULAR FUNCTION STUDY: ICD-10-CM

## 2021-06-21 DIAGNOSIS — M25.562 PAIN IN RIGHT KNEE: ICD-10-CM

## 2021-06-21 DIAGNOSIS — Z86.39 PERSONAL HISTORY OF OTHER ENDOCRINE, NUTRITIONAL AND METABOLIC DISEASE: ICD-10-CM

## 2021-06-21 DIAGNOSIS — M25.561 PAIN IN RIGHT KNEE: ICD-10-CM

## 2021-06-21 PROCEDURE — 99214 OFFICE O/P EST MOD 30 MIN: CPT

## 2021-06-21 NOTE — PHYSICAL EXAM
[Well Developed] : well developed [Well Nourished] : well nourished [No Acute Distress] : no acute distress [Normal Conjunctiva] : normal conjunctiva [Normal Venous Pressure] : normal venous pressure [No Carotid Bruit] : no carotid bruit [Normal S1, S2] : normal S1, S2 [No Murmur] : no murmur [No Rub] : no rub [No Gallop] : no gallop [Clear Lung Fields] : clear lung fields [Good Air Entry] : good air entry [No Respiratory Distress] : no respiratory distress  [Soft] : abdomen soft [Non Tender] : non-tender [No Masses/organomegaly] : no masses/organomegaly [Normal Gait] : normal gait [Normal Bowel Sounds] : normal bowel sounds [No Edema] : no edema [No Cyanosis] : no cyanosis [No Clubbing] : no clubbing [No Varicosities] : no varicosities [No Rash] : no rash [No Skin Lesions] : no skin lesions [Moves all extremities] : moves all extremities [No Focal Deficits] : no focal deficits [Normal Speech] : normal speech [Alert and Oriented] : alert and oriented [Normal memory] : normal memory [de-identified] : reproiducible pain bilateral knee area with right knee swelling and left right greater then left

## 2021-06-21 NOTE — HISTORY OF PRESENT ILLNESS
[FreeTextEntry1] : pt presents for f/u pt with htn /dyslipidemia .pt with rheumatoid arthritis pt with bilateral right greater then left .pt requests new rheumatologist secondary to insuraance issues .pt with pain in knees and difficulty with walking pt denies any chest  pain dizziness ,lightheadedness ,nausea vomiting diaphoresis\par pt s/p covid 2and 3/21 pfizer

## 2021-06-29 ENCOUNTER — APPOINTMENT (OUTPATIENT)
Dept: ORTHOPEDIC SURGERY | Facility: CLINIC | Age: 71
End: 2021-06-29

## 2021-07-13 RX ORDER — KRILL/OM-3/DHA/EPA/PHOSPHO/AST 1000-230MG
81 CAPSULE ORAL
Refills: 0 | Status: DISCONTINUED | COMMUNITY
Start: 2021-01-20 | End: 2021-07-13

## 2021-07-15 ENCOUNTER — NON-APPOINTMENT (OUTPATIENT)
Age: 71
End: 2021-07-15

## 2021-07-15 DIAGNOSIS — R89.9 UNSPECIFIED ABNORMAL FINDING IN SPECIMENS FROM OTHER ORGANS, SYSTEMS AND TISSUES: ICD-10-CM

## 2021-07-15 LAB
25(OH)D3 SERPL-MCNC: 66.3 NG/ML
ALBUMIN SERPL ELPH-MCNC: 4.1 G/DL
ALP BLD-CCNC: 94 U/L
ALT SERPL-CCNC: 12 U/L
ANA PAT FLD IF-IMP: ABNORMAL
ANA SER IF-ACNC: ABNORMAL
ANION GAP SERPL CALC-SCNC: 11 MMOL/L
APPEARANCE: CLEAR
AST SERPL-CCNC: 21 U/L
BACTERIA: NEGATIVE
BASOPHILS # BLD AUTO: 0.04 K/UL
BASOPHILS NFR BLD AUTO: 0.5 %
BILIRUB SERPL-MCNC: 0.4 MG/DL
BILIRUBIN URINE: NEGATIVE
BLOOD URINE: NEGATIVE
BUN SERPL-MCNC: 21 MG/DL
CALCIUM SERPL-MCNC: 10.4 MG/DL
CCP AB SER IA-ACNC: <8 UNITS
CHLORIDE SERPL-SCNC: 105 MMOL/L
CHOLEST SERPL-MCNC: 132 MG/DL
CK SERPL-CCNC: 105 U/L
CO2 SERPL-SCNC: 25 MMOL/L
COLOR: YELLOW
CREAT SERPL-MCNC: 1.17 MG/DL
CRP SERPL-MCNC: 7 MG/L
DSDNA AB SER-ACNC: <12 IU/ML
EOSINOPHIL # BLD AUTO: 0.19 K/UL
EOSINOPHIL NFR BLD AUTO: 2.5 %
ERYTHROCYTE [SEDIMENTATION RATE] IN BLOOD BY WESTERGREN METHOD: 22 MM/HR
ESTIMATED AVERAGE GLUCOSE: 111 MG/DL
FERRITIN SERPL-MCNC: 176 NG/ML
FOLATE SERPL-MCNC: >20 NG/ML
GLUCOSE QUALITATIVE U: NEGATIVE
GLUCOSE SERPL-MCNC: 87 MG/DL
HAPTOGLOB SERPL-MCNC: 103 MG/DL
HBA1C MFR BLD HPLC: 5.5 %
HCT VFR BLD CALC: 36.7 %
HDLC SERPL-MCNC: 50 MG/DL
HGB BLD-MCNC: 11.7 G/DL
HYALINE CASTS: 1 /LPF
IMM GRANULOCYTES NFR BLD AUTO: 0.3 %
IRON SERPL-MCNC: 58 UG/DL
KETONES URINE: NEGATIVE
LDH SERPL-CCNC: 238 U/L
LDLC SERPL CALC-MCNC: 68 MG/DL
LDLC SERPL DIRECT ASSAY-MCNC: 65 MG/DL
LEUKOCYTE ESTERASE URINE: NEGATIVE
LYMPHOCYTES # BLD AUTO: 1.57 K/UL
LYMPHOCYTES NFR BLD AUTO: 20.3 %
MAN DIFF?: NORMAL
MCHC RBC-ENTMCNC: 29.6 PG
MCHC RBC-ENTMCNC: 31.9 GM/DL
MCV RBC AUTO: 92.9 FL
MICROSCOPIC-UA: NORMAL
MONOCYTES # BLD AUTO: 0.59 K/UL
MONOCYTES NFR BLD AUTO: 7.6 %
NEUTROPHILS # BLD AUTO: 5.33 K/UL
NEUTROPHILS NFR BLD AUTO: 68.8 %
NITRITE URINE: NEGATIVE
NONHDLC SERPL-MCNC: 82 MG/DL
PH URINE: 6
PLATELET # BLD AUTO: 238 K/UL
POTASSIUM SERPL-SCNC: 3.7 MMOL/L
PROT SERPL-MCNC: 7.3 G/DL
PROTEIN URINE: ABNORMAL
RBC # BLD: 3.95 M/UL
RBC # FLD: 14.5 %
RED BLOOD CELLS URINE: 2 /HPF
RF+CCP IGG SER-IMP: NEGATIVE
RHEUMATOID FACT SER QL: 16 IU/ML
SODIUM SERPL-SCNC: 141 MMOL/L
SPECIFIC GRAVITY URINE: 1.03
SQUAMOUS EPITHELIAL CELLS: 3 /HPF
T4 FREE SERPL-MCNC: 1.5 NG/DL
TRANSFERRIN SERPL-MCNC: 228 MG/DL
TRIGL SERPL-MCNC: 71 MG/DL
TSH SERPL-ACNC: 1.51 UIU/ML
UROBILINOGEN URINE: NORMAL
VIT B12 SERPL-MCNC: 500 PG/ML
WBC # FLD AUTO: 7.74 K/UL
WHITE BLOOD CELLS URINE: 2 /HPF

## 2021-08-08 ENCOUNTER — RX RENEWAL (OUTPATIENT)
Age: 71
End: 2021-08-08

## 2021-09-05 ENCOUNTER — RX RENEWAL (OUTPATIENT)
Age: 71
End: 2021-09-05

## 2021-10-25 ENCOUNTER — LABORATORY RESULT (OUTPATIENT)
Age: 71
End: 2021-10-25

## 2021-10-25 ENCOUNTER — NON-APPOINTMENT (OUTPATIENT)
Age: 71
End: 2021-10-25

## 2021-10-25 ENCOUNTER — APPOINTMENT (OUTPATIENT)
Dept: CARDIOLOGY | Facility: CLINIC | Age: 71
End: 2021-10-25
Payer: MEDICARE

## 2021-10-25 VITALS
HEART RATE: 64 BPM | OXYGEN SATURATION: 96 % | TEMPERATURE: 98.2 F | HEIGHT: 66 IN | BODY MASS INDEX: 42.27 KG/M2 | DIASTOLIC BLOOD PRESSURE: 70 MMHG | WEIGHT: 263 LBS | SYSTOLIC BLOOD PRESSURE: 121 MMHG

## 2021-10-25 DIAGNOSIS — I99.8 OTHER DISORDER OF CIRCULATORY SYSTEM: ICD-10-CM

## 2021-10-25 DIAGNOSIS — Z71.89 OTHER SPECIFIED COUNSELING: ICD-10-CM

## 2021-10-25 PROCEDURE — 90662 IIV NO PRSV INCREASED AG IM: CPT

## 2021-10-25 PROCEDURE — G0008: CPT

## 2021-10-25 PROCEDURE — 99214 OFFICE O/P EST MOD 30 MIN: CPT | Mod: 25

## 2021-10-25 PROCEDURE — 93000 ELECTROCARDIOGRAM COMPLETE: CPT

## 2021-10-25 NOTE — HISTORY OF PRESENT ILLNESS
[FreeTextEntry1] : pt presents for well still with issues of arthritis and knee pain has not seen rheum  .pt also requests flu vaccine .pt also pending bone density and breast studie s.hpi\par pt denies any chest  pain dizziness ,lightheadedness ,nausea vomiting diaphoresis\par pt pending covid booster s/p pfizer x2 .pt with ocassional fatigue

## 2021-10-25 NOTE — PHYSICAL EXAM
[Well Developed] : well developed [Well Nourished] : well nourished [No Acute Distress] : no acute distress [Normal Conjunctiva] : normal conjunctiva [Normal Venous Pressure] : normal venous pressure [No Carotid Bruit] : no carotid bruit [Normal S1, S2] : normal S1, S2 [No Murmur] : no murmur [No Rub] : no rub [No Gallop] : no gallop [Clear Lung Fields] : clear lung fields [Good Air Entry] : good air entry [No Respiratory Distress] : no respiratory distress  [Soft] : abdomen soft [Non Tender] : non-tender [No Masses/organomegaly] : no masses/organomegaly [Normal Bowel Sounds] : normal bowel sounds [Normal Gait] : normal gait [No Edema] : no edema [No Cyanosis] : no cyanosis [No Clubbing] : no clubbing [No Varicosities] : no varicosities [No Rash] : no rash [No Skin Lesions] : no skin lesions [Moves all extremities] : moves all extremities [No Focal Deficits] : no focal deficits [Normal Speech] : normal speech [Alert and Oriented] : alert and oriented [Normal memory] : normal memory [de-identified] : pain knee area  [de-identified] : varicose veins bilaterally

## 2021-10-25 NOTE — REVIEW OF SYSTEMS
[Feeling Fatigued] : feeling fatigued [Negative] : Heme/Lymph [FreeTextEntry5] : varicose veins  [FreeTextEntry9] : knee pain

## 2021-10-28 ENCOUNTER — APPOINTMENT (OUTPATIENT)
Dept: OTHER | Facility: CLINIC | Age: 71
End: 2021-10-28
Payer: COMMERCIAL

## 2021-10-28 VITALS
HEART RATE: 52 BPM | BODY MASS INDEX: 42.27 KG/M2 | SYSTOLIC BLOOD PRESSURE: 121 MMHG | WEIGHT: 263 LBS | TEMPERATURE: 98.6 F | OXYGEN SATURATION: 100 % | HEIGHT: 66 IN | DIASTOLIC BLOOD PRESSURE: 70 MMHG | RESPIRATION RATE: 16 BRPM

## 2021-10-28 PROCEDURE — 99397 PER PM REEVAL EST PAT 65+ YR: CPT | Mod: 25

## 2022-03-04 ENCOUNTER — NON-APPOINTMENT (OUTPATIENT)
Age: 72
End: 2022-03-04

## 2022-03-04 ENCOUNTER — APPOINTMENT (OUTPATIENT)
Dept: CARDIOLOGY | Facility: CLINIC | Age: 72
End: 2022-03-04
Payer: MEDICARE

## 2022-03-04 VITALS
OXYGEN SATURATION: 99 % | HEIGHT: 66 IN | WEIGHT: 253 LBS | HEART RATE: 83 BPM | DIASTOLIC BLOOD PRESSURE: 69 MMHG | TEMPERATURE: 98.3 F | SYSTOLIC BLOOD PRESSURE: 122 MMHG | BODY MASS INDEX: 40.66 KG/M2

## 2022-03-04 PROCEDURE — 99214 OFFICE O/P EST MOD 30 MIN: CPT

## 2022-03-04 PROCEDURE — 93000 ELECTROCARDIOGRAM COMPLETE: CPT

## 2022-03-04 RX ORDER — UBIDECARENONE/VIT E ACET 100MG-5
CAPSULE ORAL
Refills: 0 | Status: DISCONTINUED | COMMUNITY
End: 2022-03-04

## 2022-03-04 NOTE — HISTORY OF PRESENT ILLNESS
[FreeTextEntry1] : This is a 71 year female with a Pmhx of HTN HLD RA,  Vit D def, who presents to the office for routine follow up\par \par pt reports feeling well Denies any complaints \par \par Pt denies any CP, SOB, heart palpitations, dizziness, abdominal pain N/V/D fever or chills\par

## 2022-03-11 ENCOUNTER — APPOINTMENT (OUTPATIENT)
Dept: CARDIOLOGY | Facility: CLINIC | Age: 72
End: 2022-03-11

## 2022-03-26 ENCOUNTER — APPOINTMENT (OUTPATIENT)
Dept: CARDIOLOGY | Facility: CLINIC | Age: 72
End: 2022-03-26
Payer: MEDICARE

## 2022-03-26 PROCEDURE — 93306 TTE W/DOPPLER COMPLETE: CPT

## 2022-06-16 ENCOUNTER — APPOINTMENT (OUTPATIENT)
Dept: PULMONOLOGY | Facility: CLINIC | Age: 72
End: 2022-06-16
Payer: MEDICARE

## 2022-06-16 VITALS
DIASTOLIC BLOOD PRESSURE: 77 MMHG | WEIGHT: 253 LBS | HEIGHT: 66 IN | HEART RATE: 84 BPM | TEMPERATURE: 98 F | OXYGEN SATURATION: 97 % | SYSTOLIC BLOOD PRESSURE: 134 MMHG | BODY MASS INDEX: 40.66 KG/M2

## 2022-06-16 LAB — POCT - HEMOGLOBIN (HGB), QUANTITATIVE, TRANSCUTANEOUS: 12.7

## 2022-06-16 PROCEDURE — 88738 HGB QUANT TRANSCUTANEOUS: CPT

## 2022-06-16 PROCEDURE — 94010 BREATHING CAPACITY TEST: CPT

## 2022-06-16 PROCEDURE — 71046 X-RAY EXAM CHEST 2 VIEWS: CPT

## 2022-06-16 PROCEDURE — ZZZZZ: CPT

## 2022-06-16 PROCEDURE — 99205 OFFICE O/P NEW HI 60 MIN: CPT | Mod: 25

## 2022-06-16 PROCEDURE — 94729 DIFFUSING CAPACITY: CPT

## 2022-06-16 PROCEDURE — 94727 GAS DIL/WSHOT DETER LNG VOL: CPT

## 2022-06-16 NOTE — HISTORY OF PRESENT ILLNESS
[Never] : never [TextBox_4] : 71-year-old female\par Patient was diagnosed June 6, 2022 at [facility] emergency department with left lower lobe pneumonia\par Treatment included Zithromax and Augmentin completed\par She had some ongoing symptoms feeling weak lightheaded dizzy shortness of breath\par Urgent care center to the emergency department\par Reported a chest x-ray with a left lower lobe pneumonia although films not available for review\par Questionable positive wheeze\par No sputum production no hemoptysis\par No fevers chills at present\par Also had some associated mild atypical chest congestion\par Significant past medical history rheumatoid arthritis\par Hyperlipidemia\par Hypertension\par Obstructive sleep apnea without active treatment\par Non-smoker\par No history of chemical toxic inhalational exposures\par Patient denies history of a COVID-19 infection\par Should be noted at the emergency department flu and COVID-19 PCR both negative\par

## 2022-06-16 NOTE — PHYSICAL EXAM
[No Acute Distress] : no acute distress [Low Lying Soft Palate] : low lying soft palate [IV] : Mallampati Class: IV [Normal Appearance] : normal appearance [Supple] : supple [No Neck Mass] : no neck mass [No JVD] : no jvd [Normal Rate/Rhythm] : normal rate/rhythm [Normal S1, S2] : normal s1, s2 [No Murmurs] : no murmurs [No Rubs] : no rubs [No Gallops] : no gallops [No Resp Distress] : no resp distress [No Acc Muscle Use] : no acc muscle use [Normal Palpation] : normal palpation [Normal Rhythm and Effort] : normal rhythm and effort [Clear to Auscultation Bilaterally] : clear to auscultation bilaterally [Normal to Percussion] : normal to percussion [Benign] : benign [Not Tender] : not tender [Soft] : soft [No HSM] : no hsm [Normal Bowel Sounds] : normal bowel sounds [No Clubbing] : no clubbing [No Cyanosis] : no cyanosis [No Edema] : no edema [FROM] : FROM [1+ Pitting] : 1+ pitting [No Focal Deficits] : no focal deficits [Oriented x3] : oriented x3 [Normal Affect] : normal affect [TextBox_80] : ambulates with cane

## 2022-06-16 NOTE — REVIEW OF SYSTEMS
[Fatigue] : fatigue [Seasonal Allergies] : seasonal allergies [Negative] : Neurologic [Fever] : no fever [Chills] : no chills [Poor Appetite] : no poor appetite [Angioedema] : no angioedema [Depression] : no depression [Anxiety] : no anxiety [TextBox_30] : HPI [TextBox_44] : HTN, HLD [TextBox_83] : CKI [TextBox_94] : Pos RA, Gout [TextBox_113] : Folic acid def [TextBox_144] : Hypercalcemia

## 2022-06-16 NOTE — PROCEDURE
[FreeTextEntry1] : PFT no bronchodilator June 16, 2022\par Flow rates normal\par Lung volumes normal\par TLC 93% predicted\par Diffusion 67% of predicted with a mild loss of functioning alveolar capillary units\par Clinical correlation\par \par Chest x-ray PA lateral June 16, 2022 reported left lower lobe pneumonia\par No prior films available for review\par Globular cardiac size but not grossly enlarged borderline\par Lamination right hemidiaphragm\par No evidence of infiltrates bilateral\par Left lower lobe clear\par

## 2022-06-16 NOTE — DISCUSSION/SUMMARY
[FreeTextEntry1] : Mild chronic dyspnea\par Rule out physical deconditioning with overweight associated\par No strong evidence for pulmonary parenchymal lung disease\par Chest x-ray demonstrates clearing of a prior reported left lower lobe pneumonia\par Patient has completed antibiotics on\par No further antibiotics indicated\par Weight loss walking exercise\par Follow-up cardiology\par Will repeat and repeat recommend repeat PFT body box in 3 months\par Patient is fully recovered from presumed pneumonia follow-up with a 6-minute walk test on\par We discussed the issue of sleep apnea management which can contribute to dyspnea although patient states she declines further work-up at present time\par

## 2022-07-08 ENCOUNTER — NON-APPOINTMENT (OUTPATIENT)
Age: 72
End: 2022-07-08

## 2022-07-08 ENCOUNTER — APPOINTMENT (OUTPATIENT)
Dept: CARDIOLOGY | Facility: CLINIC | Age: 72
End: 2022-07-08

## 2022-07-08 VITALS
HEIGHT: 66 IN | TEMPERATURE: 97.9 F | WEIGHT: 251 LBS | OXYGEN SATURATION: 97 % | DIASTOLIC BLOOD PRESSURE: 60 MMHG | BODY MASS INDEX: 40.34 KG/M2 | HEART RATE: 68 BPM | SYSTOLIC BLOOD PRESSURE: 110 MMHG

## 2022-07-08 DIAGNOSIS — J18.9 PNEUMONIA, UNSPECIFIED ORGANISM: ICD-10-CM

## 2022-07-08 DIAGNOSIS — Z13.820 ENCOUNTER FOR SCREENING FOR OSTEOPOROSIS: ICD-10-CM

## 2022-07-08 PROCEDURE — 93000 ELECTROCARDIOGRAM COMPLETE: CPT

## 2022-07-08 PROCEDURE — 99214 OFFICE O/P EST MOD 30 MIN: CPT

## 2022-07-08 NOTE — HISTORY OF PRESENT ILLNESS
[FreeTextEntry1] : This is a 72 year female with a Pmhx of pre DM HLD HTN RA who presents to the office for routine follow up\par \par pt reports hx of PNA- last month. following with Dr Scott pr reports Sx have resolved will see MD again in 3 months for follow up\par \par Pt denies any CP, SOB, heart palpitations, dizziness, abdominal pain N/V/D fever or chills\par pt with rare fatigue

## 2022-07-14 ENCOUNTER — FORM ENCOUNTER (OUTPATIENT)
Age: 72
End: 2022-07-14

## 2022-10-12 ENCOUNTER — APPOINTMENT (OUTPATIENT)
Dept: PULMONOLOGY | Facility: CLINIC | Age: 72
End: 2022-10-12

## 2022-10-12 VITALS
DIASTOLIC BLOOD PRESSURE: 67 MMHG | TEMPERATURE: 98.7 F | WEIGHT: 255 LBS | BODY MASS INDEX: 40.98 KG/M2 | SYSTOLIC BLOOD PRESSURE: 119 MMHG | OXYGEN SATURATION: 95 % | HEART RATE: 90 BPM | HEIGHT: 66 IN | RESPIRATION RATE: 18 BRPM

## 2022-10-12 PROCEDURE — 94729 DIFFUSING CAPACITY: CPT

## 2022-10-12 PROCEDURE — 94726 PLETHYSMOGRAPHY LUNG VOLUMES: CPT

## 2022-10-12 PROCEDURE — 94010 BREATHING CAPACITY TEST: CPT

## 2022-10-12 PROCEDURE — 99213 OFFICE O/P EST LOW 20 MIN: CPT | Mod: 25

## 2022-10-12 PROCEDURE — ZZZZZ: CPT

## 2022-10-12 NOTE — HISTORY OF PRESENT ILLNESS
[Never] : never [TextBox_4] : 71-year-old female\par no active complaints on RA Resp sxs\par Patient was diagnosed June 6, 2022 at [facility] emergency department with left lower lobe pneumonia\par Treatment included Zithromax and Augmentin completed\par She had some ongoing symptoms feeling weak lightheaded dizzy shortness of breath\par Urgent care center to the emergency department\par Reported a chest x-ray with a left lower lobe pneumonia although films not available for review\par Questionable positive wheeze\par No sputum production no hemoptysis\par No fevers chills at present\par Also had some associated mild atypical chest congestion\par Significant past medical history rheumatoid arthritis\par Hyperlipidemia\par Hypertension\par Obstructive sleep apnea without active treatment\par Non-smoker\par No history of chemical toxic inhalational exposures\par Patient denies history of a COVID-19 infection\par Should be noted at the emergency department flu and COVID-19 PCR both negative\par

## 2022-10-12 NOTE — DISCUSSION/SUMMARY
[FreeTextEntry1] : Mild chronic dyspnea\par Rule out physical deconditioning with overweight associated\par No strong evidence for pulmonary parenchymal lung disease\par stable pul physiology\par Chest x-ray demonstrates clearing of a prior reported left lower lobe pneumonia\par Patient has completed antibiotics on\par No further antibiotics indicated\par Weight loss walking exercise\par Follow-up cardiology\par Patient is fully recovered from presumed pneumonia \par We discussed the issue of sleep apnea management which can contribute to dyspnea although patient states she declines further work-up at present time\par declines flu vaccine will get with PMD

## 2022-10-12 NOTE — PROCEDURE
[FreeTextEntry1] : PFT 10/12/22\par flow rates nl\par Lung Volumes nl\par no  airtrapping\par  DLCO 85 %\par WNL\par  resistance and sp conductance nl\par stable pulmonary physiology\par \par PFT no bronchodilator June 16, 2022\par Flow rates normal\par Lung volumes normal\par TLC 93% predicted\par Diffusion 67% of predicted with a mild loss of functioning alveolar capillary units\par Clinical correlation\par \par Chest x-ray PA lateral June 16, 2022 reported left lower lobe pneumonia\par No prior films available for review\par Globular cardiac size but not grossly enlarged borderline\par Lamination right hemidiaphragm\par No evidence of infiltrates bilateral\par Left lower lobe clear\par

## 2022-10-27 ENCOUNTER — APPOINTMENT (OUTPATIENT)
Dept: OTHER | Facility: CLINIC | Age: 72
End: 2022-10-27

## 2022-10-27 VITALS
WEIGHT: 260 LBS | HEART RATE: 94 BPM | BODY MASS INDEX: 41.78 KG/M2 | OXYGEN SATURATION: 98 % | SYSTOLIC BLOOD PRESSURE: 118 MMHG | TEMPERATURE: 97.8 F | HEIGHT: 66 IN | DIASTOLIC BLOOD PRESSURE: 66 MMHG

## 2022-10-27 DIAGNOSIS — Z04.9 ENCOUNTER FOR EXAMINATION AND OBSERVATION FOR UNSPECIFIED REASON: ICD-10-CM

## 2022-10-27 PROCEDURE — 99397 PER PM REEVAL EST PAT 65+ YR: CPT | Mod: 25

## 2022-10-27 RX ORDER — AZITHROMYCIN 250 MG/1
250 TABLET, FILM COATED ORAL
Qty: 6 | Refills: 0 | Status: COMPLETED | COMMUNITY
Start: 2022-06-06 | End: 2022-10-27

## 2022-10-27 RX ORDER — METHOTREXATE 2.5 MG/1
2.5 TABLET ORAL
Qty: 12 | Refills: 3 | Status: COMPLETED | COMMUNITY
End: 2022-10-27

## 2022-10-27 RX ORDER — AMOXICILLIN AND CLAVULANATE POTASSIUM 875; 125 MG/1; MG/1
875-125 TABLET, COATED ORAL
Qty: 14 | Refills: 0 | Status: COMPLETED | COMMUNITY
Start: 2022-06-06 | End: 2022-10-27

## 2022-10-28 LAB
ALBUMIN SERPL ELPH-MCNC: 3.9 G/DL
ALP BLD-CCNC: 94 U/L
ALT SERPL-CCNC: 14 U/L
ANION GAP SERPL CALC-SCNC: 13 MMOL/L
AST SERPL-CCNC: 19 U/L
BASOPHILS # BLD AUTO: 0.06 K/UL
BASOPHILS NFR BLD AUTO: 0.7 %
BILIRUB SERPL-MCNC: 0.5 MG/DL
BUN SERPL-MCNC: 18 MG/DL
CALCIUM SERPL-MCNC: 9.7 MG/DL
CHLORIDE SERPL-SCNC: 102 MMOL/L
CHOLEST SERPL-MCNC: 167 MG/DL
CO2 SERPL-SCNC: 26 MMOL/L
CREAT SERPL-MCNC: 1.04 MG/DL
EGFR: 56 ML/MIN/1.73M2
EOSINOPHIL # BLD AUTO: 0.27 K/UL
EOSINOPHIL NFR BLD AUTO: 3.1 %
GLUCOSE SERPL-MCNC: 95 MG/DL
HCT VFR BLD CALC: 39.1 %
HDLC SERPL-MCNC: 62 MG/DL
HGB BLD-MCNC: 11.9 G/DL
IMM GRANULOCYTES NFR BLD AUTO: 0.2 %
LDLC SERPL CALC-MCNC: 88 MG/DL
LYMPHOCYTES # BLD AUTO: 2.53 K/UL
LYMPHOCYTES NFR BLD AUTO: 29.2 %
MAN DIFF?: NORMAL
MCHC RBC-ENTMCNC: 29.5 PG
MCHC RBC-ENTMCNC: 30.4 GM/DL
MCV RBC AUTO: 97 FL
MONOCYTES # BLD AUTO: 0.55 K/UL
MONOCYTES NFR BLD AUTO: 6.4 %
NEUTROPHILS # BLD AUTO: 5.23 K/UL
NEUTROPHILS NFR BLD AUTO: 60.4 %
NONHDLC SERPL-MCNC: 105 MG/DL
PLATELET # BLD AUTO: 258 K/UL
POTASSIUM SERPL-SCNC: 4.1 MMOL/L
PROT SERPL-MCNC: 7.1 G/DL
RBC # BLD: 4.03 M/UL
RBC # FLD: 14.2 %
SODIUM SERPL-SCNC: 142 MMOL/L
TRIGL SERPL-MCNC: 83 MG/DL
WBC # FLD AUTO: 8.66 K/UL

## 2023-01-11 ENCOUNTER — APPOINTMENT (OUTPATIENT)
Dept: PULMONOLOGY | Facility: CLINIC | Age: 73
End: 2023-01-11

## 2023-01-27 ENCOUNTER — NON-APPOINTMENT (OUTPATIENT)
Age: 73
End: 2023-01-27

## 2023-01-27 ENCOUNTER — APPOINTMENT (OUTPATIENT)
Dept: CARDIOLOGY | Facility: CLINIC | Age: 73
End: 2023-01-27
Payer: MEDICARE

## 2023-01-27 VITALS
OXYGEN SATURATION: 99 % | WEIGHT: 262 LBS | HEART RATE: 67 BPM | DIASTOLIC BLOOD PRESSURE: 60 MMHG | SYSTOLIC BLOOD PRESSURE: 118 MMHG | TEMPERATURE: 98 F | BODY MASS INDEX: 42.11 KG/M2 | HEIGHT: 66 IN

## 2023-01-27 DIAGNOSIS — Z12.39 ENCOUNTER FOR OTHER SCREENING FOR MALIGNANT NEOPLASM OF BREAST: ICD-10-CM

## 2023-01-27 DIAGNOSIS — Z71.85 ENCOUNTER FOR IMMUNIZATION SAFETY COUNSELING: ICD-10-CM

## 2023-01-27 DIAGNOSIS — Z86.39 PERSONAL HISTORY OF OTHER ENDOCRINE, NUTRITIONAL AND METABOLIC DISEASE: ICD-10-CM

## 2023-01-27 PROCEDURE — 99214 OFFICE O/P EST MOD 30 MIN: CPT | Mod: 25

## 2023-01-27 PROCEDURE — 93000 ELECTROCARDIOGRAM COMPLETE: CPT

## 2023-01-27 PROCEDURE — 90732 PPSV23 VACC 2 YRS+ SUBQ/IM: CPT

## 2023-01-27 PROCEDURE — G0009: CPT

## 2023-01-27 NOTE — HISTORY OF PRESENT ILLNESS
[FreeTextEntry1] : This is a 72 year old female with a Pmhx of HTN HLD RA, Vit D def, who presents to the office for routine follow up\par \par pt reports feeling well  complains of persistent joint pains \par \par Pt denies any CP, SOB, heart palpitations, dizziness, abdominal pain N/V/D fever or chills

## 2023-01-27 NOTE — PHYSICAL EXAM
[Well Developed] : well developed [Well Nourished] : well nourished [No Acute Distress] : no acute distress [Normal Conjunctiva] : normal conjunctiva [Normal Venous Pressure] : normal venous pressure [No Carotid Bruit] : no carotid bruit [Normal S1, S2] : normal S1, S2 [No Rub] : no rub [No Gallop] : no gallop [Clear Lung Fields] : clear lung fields [Good Air Entry] : good air entry [No Respiratory Distress] : no respiratory distress  [Soft] : abdomen soft [Non Tender] : non-tender [No Masses/organomegaly] : no masses/organomegaly [Normal Bowel Sounds] : normal bowel sounds [Normal Gait] : normal gait [No Edema] : no edema [No Cyanosis] : no cyanosis [No Clubbing] : no clubbing [No Varicosities] : no varicosities [No Rash] : no rash [No Skin Lesions] : no skin lesions [Moves all extremities] : moves all extremities [No Focal Deficits] : no focal deficits [Normal Speech] : normal speech [Alert and Oriented] : alert and oriented [Normal memory] : normal memory [de-identified] : 1/4 diastolic murmur llsb

## 2023-02-17 ENCOUNTER — APPOINTMENT (OUTPATIENT)
Dept: PULMONOLOGY | Facility: CLINIC | Age: 73
End: 2023-02-17
Payer: MEDICARE

## 2023-02-17 VITALS — HEART RATE: 83 BPM | DIASTOLIC BLOOD PRESSURE: 64 MMHG | OXYGEN SATURATION: 98 % | SYSTOLIC BLOOD PRESSURE: 118 MMHG

## 2023-02-17 DIAGNOSIS — R29.818 OTHER SYMPTOMS AND SIGNS INVOLVING THE NERVOUS SYSTEM: ICD-10-CM

## 2023-02-17 PROCEDURE — 99214 OFFICE O/P EST MOD 30 MIN: CPT | Mod: 25

## 2023-02-17 PROCEDURE — 94010 BREATHING CAPACITY TEST: CPT

## 2023-02-17 NOTE — DISCUSSION/SUMMARY
[FreeTextEntry1] : Mild chronic dyspnea better\par Rule out physical deconditioning with overweight associated\par No strong evidence for pulmonary parenchymal lung disease\par stable pul physiology\par Weight loss walking exercise\par Follow-up cardiology noted\par We discussed the issue of sleep apnea management which can contribute to dyspnea although patient states she declines further work-up at present time\par declines flu vaccine will get with PMD\par f/u\par Noted remains on long-term methotrexate and Plaquenil

## 2023-02-17 NOTE — HISTORY OF PRESENT ILLNESS
[Never] : never [TextBox_4] : 71-year-old female\par no active complaints on RA Resp sxs\par Patient was diagnosed June 6, 2022 at [facility] emergency department with left lower lobe pneumonia\par Treatment included Zithromax and Augmentin completed\par She had some ongoing symptoms feeling weak lightheaded dizzy shortness of breath\par Urgent care center to the emergency department\par Reported a chest x-ray with a left lower lobe pneumonia although films not available for review\par Questionable positive wheeze\par No sputum production no hemoptysis\par No fevers chills at present\par Also had some associated mild atypical chest congestion\par Significant past medical history rheumatoid arthritis\par Hyperlipidemia\par Hypertension\par Obstructive sleep apnea without active treatment\par Non-smoker\par No history of chemical toxic inhalational exposures\par Patient denies history of a COVID-19 infection\par Should be noted at the emergency department flu and COVID-19 PCR both negative\par

## 2023-02-17 NOTE — PROCEDURE
[FreeTextEntry1] : Spirometry no bronchodilator February 17, 2023\par Normal flow rates no decline\par \par PFT 10/12/22\par flow rates nl\par Lung Volumes nl\par no  airtrapping\par  DLCO 85 %\par WNL\par  resistance and sp conductance nl\par stable pulmonary physiology\par \par PFT no bronchodilator June 16, 2022\par Flow rates normal\par Lung volumes normal\par TLC 93% predicted\par Diffusion 67% of predicted with a mild loss of functioning alveolar capillary units\par Clinical correlation\par \par Chest x-ray PA lateral June 16, 2022 reported left lower lobe pneumonia\par No prior films available for review\par Globular cardiac size but not grossly enlarged borderline\par Lamination right hemidiaphragm\par No evidence of infiltrates bilateral\par Left lower lobe clear\par

## 2023-03-28 ENCOUNTER — NON-APPOINTMENT (OUTPATIENT)
Age: 73
End: 2023-03-28

## 2023-04-17 ENCOUNTER — RX RENEWAL (OUTPATIENT)
Age: 73
End: 2023-04-17

## 2023-04-27 ENCOUNTER — APPOINTMENT (OUTPATIENT)
Dept: OBGYN | Facility: CLINIC | Age: 73
End: 2023-04-27
Payer: MEDICARE

## 2023-04-27 VITALS
SYSTOLIC BLOOD PRESSURE: 154 MMHG | HEART RATE: 98 BPM | DIASTOLIC BLOOD PRESSURE: 64 MMHG | HEIGHT: 66 IN | WEIGHT: 263 LBS | BODY MASS INDEX: 42.27 KG/M2

## 2023-04-27 DIAGNOSIS — Z01.419 ENCOUNTER FOR GYNECOLOGICAL EXAMINATION (GENERAL) (ROUTINE) W/OUT ABNORMAL FINDINGS: ICD-10-CM

## 2023-04-27 PROCEDURE — 99387 INIT PM E/M NEW PAT 65+ YRS: CPT | Mod: GY

## 2023-04-27 PROCEDURE — G0101: CPT

## 2023-04-27 NOTE — PHYSICAL EXAM
[Chaperone Present] : A chaperone was present in the examining room during all aspects of the physical examination [Appropriately responsive] : appropriately responsive [Alert] : alert [No Acute Distress] : no acute distress [No Lymphadenopathy] : no lymphadenopathy [Soft] : soft [Non-tender] : non-tender [Non-distended] : non-distended [No HSM] : No HSM [No Lesions] : no lesions [No Mass] : no mass [Oriented x3] : oriented x3 [Examination Of The Breasts] : a normal appearance [No Masses] : no breast masses were palpable [Labia Majora] : normal [Labia Minora] : normal [Atrophy] : atrophy [Normal] : normal [Uterine Adnexae] : normal

## 2023-05-10 ENCOUNTER — RX RENEWAL (OUTPATIENT)
Age: 73
End: 2023-05-10

## 2023-05-12 ENCOUNTER — NON-APPOINTMENT (OUTPATIENT)
Age: 73
End: 2023-05-12

## 2023-05-12 ENCOUNTER — APPOINTMENT (OUTPATIENT)
Dept: CARDIOLOGY | Facility: CLINIC | Age: 73
End: 2023-05-12
Payer: MEDICARE

## 2023-05-12 VITALS
HEART RATE: 77 BPM | BODY MASS INDEX: 42.59 KG/M2 | TEMPERATURE: 97.7 F | OXYGEN SATURATION: 99 % | WEIGHT: 265 LBS | HEIGHT: 66 IN | DIASTOLIC BLOOD PRESSURE: 70 MMHG | SYSTOLIC BLOOD PRESSURE: 120 MMHG

## 2023-05-12 DIAGNOSIS — Z12.11 ENCOUNTER FOR SCREENING FOR MALIGNANT NEOPLASM OF COLON: ICD-10-CM

## 2023-05-12 DIAGNOSIS — T78.40XA ALLERGY, UNSPECIFIED, INITIAL ENCOUNTER: ICD-10-CM

## 2023-05-12 DIAGNOSIS — R94.31 ABNORMAL ELECTROCARDIOGRAM [ECG] [EKG]: ICD-10-CM

## 2023-05-12 PROCEDURE — 93000 ELECTROCARDIOGRAM COMPLETE: CPT

## 2023-05-12 PROCEDURE — 93306 TTE W/DOPPLER COMPLETE: CPT

## 2023-05-12 PROCEDURE — 99214 OFFICE O/P EST MOD 30 MIN: CPT | Mod: 25

## 2023-05-12 NOTE — HISTORY OF PRESENT ILLNESS
[FreeTextEntry1] : This is a 73 y/o female with a pmhx of HTN HLD RA, Vit D def, who presents to the office for routine follow up. echo one today with no significant changes mild pulm HTN and mild LVH. pt reports feeling well Denies any complaints denies any chest pain sob dizzines n/v/d/ fever or chills \par

## 2023-05-12 NOTE — PHYSICAL EXAM
[Well Nourished] : well nourished [Well Developed] : well developed [No Acute Distress] : no acute distress [Normal Conjunctiva] : normal conjunctiva [Normal Venous Pressure] : normal venous pressure [No Carotid Bruit] : no carotid bruit [Normal S1, S2] : normal S1, S2 [No Rub] : no rub [No Murmur] : no murmur [No Gallop] : no gallop [Clear Lung Fields] : clear lung fields [Good Air Entry] : good air entry [No Respiratory Distress] : no respiratory distress  [Soft] : abdomen soft [Non Tender] : non-tender [No Masses/organomegaly] : no masses/organomegaly [Normal Bowel Sounds] : normal bowel sounds [Normal Gait] : normal gait [No Cyanosis] : no cyanosis [No Clubbing] : no clubbing [No Varicosities] : no varicosities [No Rash] : no rash [No Skin Lesions] : no skin lesions [Moves all extremities] : moves all extremities [No Focal Deficits] : no focal deficits [Normal Speech] : normal speech [Alert and Oriented] : alert and oriented [Normal memory] : normal memory [de-identified] : 1-2 + edema bilaterally

## 2023-05-16 ENCOUNTER — NON-APPOINTMENT (OUTPATIENT)
Age: 73
End: 2023-05-16

## 2023-05-16 LAB
25(OH)D3 SERPL-MCNC: 70.8 NG/ML
ALBUMIN SERPL ELPH-MCNC: 4.1 G/DL
ALP BLD-CCNC: 97 U/L
ALT SERPL-CCNC: 11 U/L
ANION GAP SERPL CALC-SCNC: 14 MMOL/L
AST SERPL-CCNC: 17 U/L
BASOPHILS # BLD AUTO: 0.04 K/UL
BASOPHILS NFR BLD AUTO: 0.5 %
BILIRUB DIRECT SERPL-MCNC: 0.2 MG/DL
BILIRUB INDIRECT SERPL-MCNC: 0.3 MG/DL
BILIRUB SERPL-MCNC: 0.4 MG/DL
BUN SERPL-MCNC: 20 MG/DL
CALCIUM SERPL-MCNC: 10.1 MG/DL
CHLORIDE SERPL-SCNC: 103 MMOL/L
CHOLEST SERPL-MCNC: 161 MG/DL
CK SERPL-CCNC: 103 U/L
CO2 SERPL-SCNC: 24 MMOL/L
CREAT SERPL-MCNC: 0.99 MG/DL
CRP SERPL-MCNC: 6 MG/L
EGFR: 60 ML/MIN/1.73M2
EOSINOPHIL # BLD AUTO: 0.24 K/UL
EOSINOPHIL NFR BLD AUTO: 2.9 %
ESTIMATED AVERAGE GLUCOSE: 117 MG/DL
GLUCOSE SERPL-MCNC: 94 MG/DL
HBA1C MFR BLD HPLC: 5.7 %
HCT VFR BLD CALC: 38 %
HDLC SERPL-MCNC: 58 MG/DL
HGB BLD-MCNC: 12.4 G/DL
IMM GRANULOCYTES NFR BLD AUTO: 0.2 %
LDLC SERPL CALC-MCNC: 83 MG/DL
LYMPHOCYTES # BLD AUTO: 1.98 K/UL
LYMPHOCYTES NFR BLD AUTO: 23.9 %
MAGNESIUM SERPL-MCNC: 2.1 MG/DL
MAN DIFF?: NORMAL
MCHC RBC-ENTMCNC: 29.5 PG
MCHC RBC-ENTMCNC: 32.6 GM/DL
MCV RBC AUTO: 90.5 FL
MONOCYTES # BLD AUTO: 0.65 K/UL
MONOCYTES NFR BLD AUTO: 7.9 %
NEUTROPHILS # BLD AUTO: 5.34 K/UL
NEUTROPHILS NFR BLD AUTO: 64.6 %
NONHDLC SERPL-MCNC: 103 MG/DL
NT-PROBNP SERPL-MCNC: 404 PG/ML
PLATELET # BLD AUTO: 254 K/UL
POTASSIUM SERPL-SCNC: 3.7 MMOL/L
PROT SERPL-MCNC: 7.7 G/DL
RBC # BLD: 4.2 M/UL
RBC # FLD: 14 %
SODIUM SERPL-SCNC: 142 MMOL/L
T4 FREE SERPL-MCNC: 1.4 NG/DL
TRIGL SERPL-MCNC: 101 MG/DL
TSH SERPL-ACNC: 1.45 UIU/ML
WBC # FLD AUTO: 8.27 K/UL

## 2023-05-25 ENCOUNTER — APPOINTMENT (OUTPATIENT)
Dept: CARDIOLOGY | Facility: CLINIC | Age: 73
End: 2023-05-25
Payer: MEDICARE

## 2023-05-25 VITALS
TEMPERATURE: 98.2 F | HEIGHT: 66 IN | HEART RATE: 94 BPM | SYSTOLIC BLOOD PRESSURE: 110 MMHG | OXYGEN SATURATION: 96 % | WEIGHT: 268 LBS | DIASTOLIC BLOOD PRESSURE: 70 MMHG | BODY MASS INDEX: 43.07 KG/M2

## 2023-05-25 PROCEDURE — 99213 OFFICE O/P EST LOW 20 MIN: CPT

## 2023-05-25 NOTE — HISTORY OF PRESENT ILLNESS
[FreeTextEntry1] : This is a 73 y/o female with a pmhx of HTN HLD RA, Vit D def, who presents to the office for a medication follow up. She was last seen in the office on 5/12/23 with LE edema and dyazide was changed for lasix 40 mg and Pt was initiated on KCL 20 MEQ. Otherwise Pt feels well. Reports stable dyspnea\par Patient denies chest pain, palpitations, dizziness, syncope, changes in bowel/bladder habits or appetite.

## 2023-06-14 ENCOUNTER — APPOINTMENT (OUTPATIENT)
Dept: GASTROENTEROLOGY | Facility: CLINIC | Age: 73
End: 2023-06-14
Payer: MEDICARE

## 2023-06-14 VITALS
HEART RATE: 80 BPM | DIASTOLIC BLOOD PRESSURE: 70 MMHG | SYSTOLIC BLOOD PRESSURE: 124 MMHG | OXYGEN SATURATION: 99 % | RESPIRATION RATE: 14 BRPM

## 2023-06-14 VITALS — BODY MASS INDEX: 42.59 KG/M2 | WEIGHT: 265 LBS | HEIGHT: 66 IN

## 2023-06-14 DIAGNOSIS — E53.8 DEFICIENCY OF OTHER SPECIFIED B GROUP VITAMINS: ICD-10-CM

## 2023-06-14 PROCEDURE — 99203 OFFICE O/P NEW LOW 30 MIN: CPT

## 2023-06-14 NOTE — PHYSICAL EXAM
[Alert] : alert [Normal Voice/Communication] : normal voice/communication [Healthy Appearing] : healthy appearing [No Acute Distress] : no acute distress [Sclera] : the sclera and conjunctiva were normal [Hearing Threshold Finger Rub Not Reynolds] : hearing was normal [Normal Lips/Gums] : the lips and gums were normal [Oropharynx] : the oropharynx was normal [Normal Appearance] : the appearance of the neck was normal [No Neck Mass] : no neck mass was observed [No Acc Muscle Use] : no accessory muscle use [No Respiratory Distress] : no respiratory distress [Respiration, Rhythm And Depth] : normal respiratory rhythm and effort [Auscultation Breath Sounds / Voice Sounds] : lungs were clear to auscultation bilaterally [Heart Rate And Rhythm] : heart rate was normal and rhythm regular [Normal S1, S2] : normal S1 and S2 [Murmurs] : no murmurs [Bowel Sounds] : normal bowel sounds [Abdomen Tenderness] : non-tender [No Masses] : no abdominal mass palpated [Abdomen Soft] : soft [] : no hepatosplenomegaly [Oriented To Time, Place, And Person] : oriented to person, place, and time

## 2023-06-14 NOTE — HISTORY OF PRESENT ILLNESS
[FreeTextEntry1] : 72-year-old female history of positive Cologuard in 2019 for colorectal cancer screening.  She denies any weight loss anorexia or rectal bleeding or change in bowel habits.  She had a calcium score zero 2021, neg nuclear stress test 2021.  She follows regularly with Dr. Corley for lower extremity edema which is managed by furosemide.  She is up-to-date with gynecologic evaluation.

## 2023-06-14 NOTE — ASSESSMENT
[FreeTextEntry1] : 72-year-old female history of positive Cologuard in 2019 for colorectal cancer screening.  She denies any weight loss anorexia or rectal bleeding or change in bowel habits.  She had a calcium score zero 2021, neg nuclear stress test 2021.  She follows regularly with Dr. Corley for lower extremity edema which is managed by furosemide.  She is up-to-date with gynecologic evaluation.\par \par IMP:\par 1. positive cologuard\par 2. LE edema\par 3. Obesity\par 4. HLD\par 5. HTN, stable on medication\par \par \par PLAN:\par She was advised to undergo colonoscopy to which she  agreed. The procedure will be performed in [facility] with the assistance of an anesthesiologist. The procedure was explained in detail and she understood the risks of the procedure not limited  to infection, bleeding, perforation, risk of anesthesia and risk of IV site problems,emergency surgery, missed lesions  or non-diagnosis of colon cancer. She  was advised that she could not drive home alone, if the patient chooses to receive sedation. Further diagnostic and treatment recommendations will be based upon the procedure and any biopsies, if they are taken.\par maintain current htn, hld medication\par followup with Dr. Corley as directed.

## 2023-06-14 NOTE — REVIEW OF SYSTEMS
[Arthralgias (joint pain)] : arthralgias [Negative] : Heme/Lymph [Recent Weight Gain (___ Lbs)] : no recent weight gain [Recent Weight Loss (___ Lbs)] : no recent weight loss [Abdominal Pain] : no abdominal pain [Vomiting] : no vomiting [Bleeding] : no bleeding

## 2023-06-30 ENCOUNTER — APPOINTMENT (OUTPATIENT)
Dept: PULMONOLOGY | Facility: CLINIC | Age: 73
End: 2023-06-30
Payer: MEDICARE

## 2023-06-30 VITALS
WEIGHT: 256 LBS | OXYGEN SATURATION: 96 % | DIASTOLIC BLOOD PRESSURE: 76 MMHG | SYSTOLIC BLOOD PRESSURE: 130 MMHG | HEART RATE: 76 BPM | HEIGHT: 66 IN | BODY MASS INDEX: 41.14 KG/M2

## 2023-06-30 PROCEDURE — 94729 DIFFUSING CAPACITY: CPT

## 2023-06-30 PROCEDURE — 99214 OFFICE O/P EST MOD 30 MIN: CPT | Mod: 25

## 2023-06-30 PROCEDURE — 94010 BREATHING CAPACITY TEST: CPT

## 2023-06-30 PROCEDURE — 94727 GAS DIL/WSHOT DETER LNG VOL: CPT

## 2023-06-30 PROCEDURE — 71046 X-RAY EXAM CHEST 2 VIEWS: CPT

## 2023-06-30 PROCEDURE — ZZZZZ: CPT

## 2023-06-30 NOTE — PROCEDURE
[FreeTextEntry1] : PFT June 30, 2023\par Flow rates normal\par FEV1 94% predicted\par Lung volumes normal\par TLC 23% predicted\par Decreased ERV secondary to truncal obesity\par Diffusion normal range 75% predicted\par Hemoglobin 12.4\par Overall stable pulmonary physiology\par \par Chest x-ray PA lateral June 30, 2023\par Borderline cardiomegaly\par Lung fields are clear\par No parenchymal infiltrates pleural effusions dominant pulmonary nodules\par Soft tissue bony structures unremarkable\par Mediastinum unremarkable\par Lamination right hemidiaphragm\par No interval change dating back to chest x-ray June 16, 2022\par \par PFT 10/12/22\par flow rates nl\par Lung Volumes nl\par no  airtrapping\par  DLCO 85 %\par WNL\par  resistance and sp conductance nl\par stable pulmonary physiology\par \par PFT no bronchodilator June 16, 2022\par Flow rates normal\par Lung volumes normal\par TLC 93% predicted\par Diffusion 67% of predicted with a mild loss of functioning alveolar capillary units\par Clinical correlation\par \par Chest x-ray PA lateral June 16, 2022 reported left lower lobe pneumonia\par No prior films available for review\par Globular cardiac size but not grossly enlarged borderline\par Lamination right hemidiaphragm\par No evidence of infiltrates bilateral\par Left lower lobe clear\par

## 2023-06-30 NOTE — HISTORY OF PRESENT ILLNESS
[Never] : never [TextBox_4] : 71-year-old female\par no active complaints on RA Resp sxs\par Patient was diagnosed June 6, 2022 at [facility] emergency department with left lower lobe pneumonia\par Treatment included Zithromax and Augmentin completed\par She had some ongoing symptoms feeling weak lightheaded dizzy shortness of breath\par Urgent care center to the emergency department\par Reported a chest x-ray with a left lower lobe pneumonia although films not available for review\par Questionable positive wheeze\par No sputum production no hemoptysis\par No fevers chills at present\par Also had some associated mild atypical chest congestion\par Significant past medical history rheumatoid arthritis\par Hyperlipidemia\par Hypertension\par Obstructive sleep apnea without active treatment\par Non-smoker\par No history of chemical toxic inhalational exposures\par Patient denies history of a COVID-19 infection\par Should be noted at the emergency department flu and COVID-19 PCR both negative\par

## 2023-06-30 NOTE — REASON FOR VISIT
[Follow-Up - From Hospitalization] : a follow-up visit after a recent hospitalization [TextBox_44] : Mild pulmonary hypertension, dyspnea

## 2023-06-30 NOTE — PHYSICAL EXAM
[No Acute Distress] : no acute distress [Low Lying Soft Palate] : low lying soft palate [IV] : Mallampati Class: IV [Normal Appearance] : normal appearance [Supple] : supple [No Neck Mass] : no neck mass [No JVD] : no jvd [Normal Rate/Rhythm] : normal rate/rhythm [Normal S1, S2] : normal s1, s2 [No Murmurs] : no murmurs [No Rubs] : no rubs [No Gallops] : no gallops [No Resp Distress] : no resp distress [No Acc Muscle Use] : no acc muscle use [Normal Palpation] : normal palpation [Normal Rhythm and Effort] : normal rhythm and effort [Clear to Auscultation Bilaterally] : clear to auscultation bilaterally [Normal to Percussion] : normal to percussion [Benign] : benign [Soft] : soft [Not Tender] : not tender [No HSM] : no hsm [Normal Bowel Sounds] : normal bowel sounds [No Clubbing] : no clubbing [No Cyanosis] : no cyanosis [No Edema] : no edema [FROM] : FROM [1+ Pitting] : 1+ pitting [No Focal Deficits] : no focal deficits [Oriented x3] : oriented x3 [Normal Affect] : normal affect [TextBox_80] : ambulates with cane

## 2023-06-30 NOTE — DISCUSSION/SUMMARY
[FreeTextEntry1] : Mild chronic dyspnea better\par Rule out physical deconditioning with overweight associated\par No strong evidence for pulmonary parenchymal lung disease\par stable pul physiology\par Weight loss walking exercise\par Follow-up cardiology noted\par We discussed the issue of sleep apnea management which can contribute to dyspnea although patient states she declines further work-up at present time\par Noted remains on long-term methotrexate and Plaquenil

## 2023-07-12 ENCOUNTER — APPOINTMENT (OUTPATIENT)
Dept: CARDIOLOGY | Facility: CLINIC | Age: 73
End: 2023-07-12
Payer: MEDICARE

## 2023-07-12 VITALS
BODY MASS INDEX: 41.14 KG/M2 | DIASTOLIC BLOOD PRESSURE: 78 MMHG | HEART RATE: 85 BPM | TEMPERATURE: 97.6 F | HEIGHT: 66 IN | SYSTOLIC BLOOD PRESSURE: 125 MMHG | OXYGEN SATURATION: 97 % | WEIGHT: 256 LBS

## 2023-07-12 PROCEDURE — 99214 OFFICE O/P EST MOD 30 MIN: CPT

## 2023-07-12 NOTE — HISTORY OF PRESENT ILLNESS
[FreeTextEntry1] : pt persents for f/u pt feels well .pt with improvement in lessing on lasix of thiazide diuretics pt denies any chest  pain dizziness ,lightheadedness ,nausea vomiting diaphoresis\par pt saw dr montelongo recently re pulm status ok cxr ok .pt pending colonoscopyfpr positive cologuard .

## 2023-07-12 NOTE — PHYSICAL EXAM

## 2023-07-13 ENCOUNTER — RX RENEWAL (OUTPATIENT)
Age: 73
End: 2023-07-13

## 2023-07-17 LAB
ANION GAP SERPL CALC-SCNC: 16 MMOL/L
APPEARANCE: CLEAR
BACTERIA UR CULT: NORMAL
BACTERIA: NEGATIVE /HPF
BILIRUBIN URINE: NEGATIVE
BLOOD URINE: NEGATIVE
BUN SERPL-MCNC: 25 MG/DL
CALCIUM SERPL-MCNC: 9.6 MG/DL
CAST: 0 /LPF
CHLORIDE SERPL-SCNC: 105 MMOL/L
CO2 SERPL-SCNC: 24 MMOL/L
COLOR: YELLOW
CREAT SERPL-MCNC: 1.07 MG/DL
CREAT SPEC-SCNC: 24 MG/DL
EGFR: 54 ML/MIN/1.73M2
EPITHELIAL CELLS: 0 /HPF
FERRITIN SERPL-MCNC: 150 NG/ML
FOLATE SERPL-MCNC: 8.6 NG/ML
GLUCOSE QUALITATIVE U: NEGATIVE MG/DL
GLUCOSE SERPL-MCNC: 94 MG/DL
HAPTOGLOB SERPL-MCNC: 80 MG/DL
IRON SERPL-MCNC: 48 UG/DL
KETONES URINE: NEGATIVE MG/DL
LDH SERPL-CCNC: 251 U/L
LEUKOCYTE ESTERASE URINE: NEGATIVE
MAGNESIUM SERPL-MCNC: 1.9 MG/DL
MICROALBUMIN 24H UR DL<=1MG/L-MCNC: <1.2 MG/DL
MICROALBUMIN/CREAT 24H UR-RTO: NORMAL MG/G
MICROSCOPIC-UA: NORMAL
NITRITE URINE: NEGATIVE
NT-PROBNP SERPL-MCNC: 603 PG/ML
PH URINE: 6.5
POTASSIUM SERPL-SCNC: 3.5 MMOL/L
PROTEIN URINE: NEGATIVE MG/DL
RED BLOOD CELLS URINE: 0 /HPF
SODIUM SERPL-SCNC: 145 MMOL/L
SPECIFIC GRAVITY URINE: 1.01
TRANSFERRIN SERPL-MCNC: 232 MG/DL
UROBILINOGEN URINE: 0.2 MG/DL
VIT B12 SERPL-MCNC: 375 PG/ML
WHITE BLOOD CELLS URINE: 0 /HPF

## 2023-07-20 ENCOUNTER — RX RENEWAL (OUTPATIENT)
Age: 73
End: 2023-07-20

## 2023-07-31 ENCOUNTER — APPOINTMENT (OUTPATIENT)
Dept: INTERNAL MEDICINE | Facility: CLINIC | Age: 73
End: 2023-07-31
Payer: MEDICARE

## 2023-07-31 VITALS
WEIGHT: 259 LBS | DIASTOLIC BLOOD PRESSURE: 82 MMHG | RESPIRATION RATE: 18 BRPM | BODY MASS INDEX: 41.62 KG/M2 | SYSTOLIC BLOOD PRESSURE: 120 MMHG | OXYGEN SATURATION: 98 % | HEIGHT: 66 IN | HEART RATE: 76 BPM | TEMPERATURE: 98.7 F

## 2023-07-31 DIAGNOSIS — Z12.11 ENCOUNTER FOR SCREENING FOR MALIGNANT NEOPLASM OF COLON: ICD-10-CM

## 2023-07-31 DIAGNOSIS — R82.90 UNSPECIFIED ABNORMAL FINDINGS IN URINE: ICD-10-CM

## 2023-07-31 LAB
ANION GAP SERPL CALC-SCNC: 15 MMOL/L
BUN SERPL-MCNC: 20 MG/DL
CALCIUM SERPL-MCNC: 10.2 MG/DL
CHLORIDE SERPL-SCNC: 104 MMOL/L
CO2 SERPL-SCNC: 24 MMOL/L
CREAT SERPL-MCNC: 0.93 MG/DL
EGFR: 64 ML/MIN/1.73M2
GLUCOSE SERPL-MCNC: 91 MG/DL
NT-PROBNP SERPL-MCNC: 447 PG/ML
POTASSIUM SERPL-SCNC: 4.1 MMOL/L
SODIUM SERPL-SCNC: 143 MMOL/L

## 2023-07-31 PROCEDURE — 99214 OFFICE O/P EST MOD 30 MIN: CPT

## 2023-07-31 NOTE — HISTORY OF PRESENT ILLNESS
[FreeTextEntry1] : follow up on leg swellig [de-identified] : This is a 73 y/o female with a PMHx of HTN HLD RA, Vit D def, who presents to the office for follow up. On last visit, Spironolactone was added by Dr. Corley and Pt claims her LE swelling has improved but not resolved down since last week. She is here to f/u on her potassium levels and has no other acute complaints today.  Denies chest pain, SOB, SUGGS, dizziness, diaphoresis, palpitations, orthopnea, syncope, n/v, headache, dysuria.

## 2023-07-31 NOTE — PHYSICAL EXAM
[No Acute Distress] : no acute distress [Well Nourished] : well nourished [Well Developed] : well developed [Well-Appearing] : well-appearing [Normal Sclera/Conjunctiva] : normal sclera/conjunctiva [Normal Outer Ear/Nose] : the outer ears and nose were normal in appearance [Normal Oropharynx] : the oropharynx was normal [No JVD] : no jugular venous distention [No Lymphadenopathy] : no lymphadenopathy [Supple] : supple [No Respiratory Distress] : no respiratory distress  [No Accessory Muscle Use] : no accessory muscle use [Clear to Auscultation] : lungs were clear to auscultation bilaterally [Normal Rate] : normal rate  [Regular Rhythm] : with a regular rhythm [Normal S1, S2] : normal S1 and S2 [No Murmur] : no murmur heard [No Carotid Bruits] : no carotid bruits [No Varicosities] : no varicosities [Pedal Pulses Present] : the pedal pulses are present [No Extremity Clubbing/Cyanosis] : no extremity clubbing/cyanosis [Soft] : abdomen soft [Non Tender] : non-tender [Non-distended] : non-distended [Normal Bowel Sounds] : normal bowel sounds [Normal Anterior Cervical Nodes] : no anterior cervical lymphadenopathy [No CVA Tenderness] : no CVA  tenderness [No Spinal Tenderness] : no spinal tenderness [No Joint Swelling] : no joint swelling [Grossly Normal Strength/Tone] : grossly normal strength/tone [No Rash] : no rash [Coordination Grossly Intact] : coordination grossly intact [No Focal Deficits] : no focal deficits [Normal Gait] : normal gait [Alert and Oriented x3] : oriented to person, place, and time [de-identified] : 1+ edema in ankles

## 2023-07-31 NOTE — HEALTH RISK ASSESSMENT
[0] : 2) Feeling down, depressed, or hopeless: Not at all (0) [PHQ-2 Negative - No further assessment needed] : PHQ-2 Negative - No further assessment needed [Never] : Never [[id number]] : 0

## 2023-08-22 ENCOUNTER — APPOINTMENT (OUTPATIENT)
Dept: GASTROENTEROLOGY | Facility: AMBULATORY SURGERY CENTER | Age: 73
End: 2023-08-22
Payer: MEDICARE

## 2023-08-22 PROCEDURE — 45378 DIAGNOSTIC COLONOSCOPY: CPT

## 2023-09-13 ENCOUNTER — RX RENEWAL (OUTPATIENT)
Age: 73
End: 2023-09-13

## 2023-09-28 ENCOUNTER — APPOINTMENT (OUTPATIENT)
Dept: PULMONOLOGY | Facility: CLINIC | Age: 73
End: 2023-09-28

## 2023-09-28 ENCOUNTER — APPOINTMENT (OUTPATIENT)
Dept: PULMONOLOGY | Facility: CLINIC | Age: 73
End: 2023-09-28
Payer: MEDICARE

## 2023-09-28 VITALS — OXYGEN SATURATION: 96 % | SYSTOLIC BLOOD PRESSURE: 132 MMHG | HEART RATE: 73 BPM | DIASTOLIC BLOOD PRESSURE: 71 MMHG

## 2023-09-28 DIAGNOSIS — Z23 ENCOUNTER FOR IMMUNIZATION: ICD-10-CM

## 2023-09-28 PROCEDURE — 94010 BREATHING CAPACITY TEST: CPT

## 2023-09-28 PROCEDURE — G0008: CPT

## 2023-09-28 PROCEDURE — 90662 IIV NO PRSV INCREASED AG IM: CPT

## 2023-09-28 PROCEDURE — 94618 PULMONARY STRESS TESTING: CPT

## 2023-09-28 PROCEDURE — 94727 GAS DIL/WSHOT DETER LNG VOL: CPT

## 2023-09-28 PROCEDURE — 94729 DIFFUSING CAPACITY: CPT

## 2023-09-28 PROCEDURE — 99214 OFFICE O/P EST MOD 30 MIN: CPT | Mod: 25

## 2023-10-26 ENCOUNTER — APPOINTMENT (OUTPATIENT)
Dept: OTHER | Facility: CLINIC | Age: 73
End: 2023-10-26
Payer: COMMERCIAL

## 2023-10-26 VITALS
BODY MASS INDEX: 41.78 KG/M2 | DIASTOLIC BLOOD PRESSURE: 76 MMHG | WEIGHT: 260 LBS | HEIGHT: 66 IN | HEART RATE: 64 BPM | OXYGEN SATURATION: 99 % | SYSTOLIC BLOOD PRESSURE: 136 MMHG

## 2023-10-26 PROCEDURE — 99397 PER PM REEVAL EST PAT 65+ YR: CPT | Mod: 25

## 2023-10-26 RX ORDER — SODIUM SULFATE, POTASSIUM SULFATE AND MAGNESIUM SULFATE 1.6; 3.13; 17.5 G/177ML; G/177ML; G/177ML
17.5-3.13-1.6 SOLUTION ORAL
Qty: 354 | Refills: 0 | Status: COMPLETED | COMMUNITY
Start: 2023-06-14 | End: 2023-10-26

## 2023-10-26 RX ORDER — ZOSTER VACCINE RECOMBINANT, ADJUVANTED 50 MCG/0.5
50 KIT INTRAMUSCULAR
Qty: 1 | Refills: 1 | Status: COMPLETED | COMMUNITY
Start: 2023-01-27 | End: 2023-10-26

## 2023-11-12 ENCOUNTER — RX RENEWAL (OUTPATIENT)
Age: 73
End: 2023-11-12

## 2023-11-17 ENCOUNTER — APPOINTMENT (OUTPATIENT)
Dept: CARDIOLOGY | Facility: CLINIC | Age: 73
End: 2023-11-17
Payer: MEDICARE

## 2023-11-17 VITALS
HEART RATE: 85 BPM | TEMPERATURE: 97.7 F | SYSTOLIC BLOOD PRESSURE: 120 MMHG | BODY MASS INDEX: 41.62 KG/M2 | DIASTOLIC BLOOD PRESSURE: 80 MMHG | OXYGEN SATURATION: 99 % | WEIGHT: 259 LBS | HEIGHT: 66 IN

## 2023-11-17 PROCEDURE — 93000 ELECTROCARDIOGRAM COMPLETE: CPT

## 2023-11-17 PROCEDURE — 99214 OFFICE O/P EST MOD 30 MIN: CPT | Mod: 25

## 2023-11-30 LAB
ALBUMIN SERPL ELPH-MCNC: 4.3 G/DL
ALP BLD-CCNC: 87 U/L
ALT SERPL-CCNC: 10 U/L
ANION GAP SERPL CALC-SCNC: 15 MMOL/L
APPEARANCE: CLEAR
AST SERPL-CCNC: 15 U/L
BACTERIA: NEGATIVE /HPF
BASOPHILS # BLD AUTO: 0.03 K/UL
BASOPHILS NFR BLD AUTO: 0.4 %
BILIRUB DIRECT SERPL-MCNC: 0.2 MG/DL
BILIRUB INDIRECT SERPL-MCNC: 0.5 MG/DL
BILIRUB SERPL-MCNC: 0.7 MG/DL
BILIRUBIN URINE: NEGATIVE
BLOOD URINE: NEGATIVE
BUN SERPL-MCNC: 25 MG/DL
CALCIUM SERPL-MCNC: 10 MG/DL
CAST: 0 /LPF
CCP AB SER IA-ACNC: <8 UNITS
CHLORIDE SERPL-SCNC: 103 MMOL/L
CHOLEST SERPL-MCNC: 162 MG/DL
CK SERPL-CCNC: 93 U/L
CO2 SERPL-SCNC: 24 MMOL/L
COLOR: YELLOW
CREAT SERPL-MCNC: 1.26 MG/DL
EGFR: 45 ML/MIN/1.73M2
EOSINOPHIL # BLD AUTO: 0.12 K/UL
EOSINOPHIL NFR BLD AUTO: 1.8 %
EPITHELIAL CELLS: 3 /HPF
ESTIMATED AVERAGE GLUCOSE: 108 MG/DL
GLUCOSE QUALITATIVE U: NEGATIVE MG/DL
GLUCOSE SERPL-MCNC: 95 MG/DL
HBA1C MFR BLD HPLC: 5.4 %
HCT VFR BLD CALC: 41.2 %
HDLC SERPL-MCNC: 55 MG/DL
HGB BLD-MCNC: 13.2 G/DL
IMM GRANULOCYTES NFR BLD AUTO: 0.3 %
KETONES URINE: NEGATIVE MG/DL
LDLC SERPL CALC-MCNC: 93 MG/DL
LEUKOCYTE ESTERASE URINE: ABNORMAL
LYMPHOCYTES # BLD AUTO: 1.77 K/UL
LYMPHOCYTES NFR BLD AUTO: 26 %
MAGNESIUM SERPL-MCNC: 1.9 MG/DL
MAN DIFF?: NORMAL
MCHC RBC-ENTMCNC: 30.5 PG
MCHC RBC-ENTMCNC: 32 GM/DL
MCV RBC AUTO: 95.2 FL
MICROSCOPIC-UA: NORMAL
MONOCYTES # BLD AUTO: 0.52 K/UL
MONOCYTES NFR BLD AUTO: 7.6 %
NEUTROPHILS # BLD AUTO: 4.34 K/UL
NEUTROPHILS NFR BLD AUTO: 63.9 %
NITRITE URINE: NEGATIVE
NONHDLC SERPL-MCNC: 107 MG/DL
NT-PROBNP SERPL-MCNC: 836 PG/ML
PH URINE: 6.5
PLATELET # BLD AUTO: 228 K/UL
POTASSIUM SERPL-SCNC: 4.3 MMOL/L
PROT SERPL-MCNC: 7.2 G/DL
PROTEIN URINE: NEGATIVE MG/DL
RBC # BLD: 4.33 M/UL
RBC # FLD: 14.4 %
RED BLOOD CELLS URINE: 0 /HPF
RF+CCP IGG SER-IMP: NEGATIVE
SODIUM SERPL-SCNC: 141 MMOL/L
SPECIFIC GRAVITY URINE: 1.01
T4 FREE SERPL-MCNC: 1.7 NG/DL
TRIGL SERPL-MCNC: 74 MG/DL
TSH SERPL-ACNC: 1.11 UIU/ML
UROBILINOGEN URINE: 0.2 MG/DL
WBC # FLD AUTO: 6.8 K/UL
WHITE BLOOD CELLS URINE: 1 /HPF

## 2023-12-28 ENCOUNTER — APPOINTMENT (OUTPATIENT)
Dept: PULMONOLOGY | Facility: CLINIC | Age: 73
End: 2023-12-28
Payer: MEDICARE

## 2023-12-28 VITALS — HEART RATE: 71 BPM | SYSTOLIC BLOOD PRESSURE: 123 MMHG | DIASTOLIC BLOOD PRESSURE: 75 MMHG | OXYGEN SATURATION: 97 %

## 2023-12-28 PROCEDURE — 99214 OFFICE O/P EST MOD 30 MIN: CPT

## 2023-12-28 NOTE — HISTORY OF PRESENT ILLNESS
[Never] : never [TextBox_4] : 71-year-old female occ dry  cough no active complaints on RA Resp sxs Patient was diagnosed June 6, 2022 at [facility] emergency department with left lower lobe pneumonia Treatment included Zithromax and Augmentin completed She had some ongoing symptoms feeling weak lightheaded dizzy shortness of breath Urgent care center to the emergency department Reported a chest x-ray with a left lower lobe pneumonia although films not available for review Questionable positive wheeze No sputum production no hemoptysis No fevers chills at present Also had some associated mild atypical chest congestion Significant past medical history rheumatoid arthritis Hyperlipidemia Hypertension Obstructive sleep apnea without active treatment Non-smoker No history of chemical toxic inhalational exposures Patient denies history of a COVID-19 infection Should be noted at the emergency department flu and COVID-19 PCR both negative

## 2023-12-28 NOTE — DISCUSSION/SUMMARY
[FreeTextEntry1] : Mild chronic dyspnea better Rule out physical deconditioning with overweight associated No strong evidence for pulmonary parenchymal lung disease stable pul physiology Weight loss walking exercise Follow-up cardiology noted We discussed the issue of sleep apnea management which can contribute to dyspnea although patient states she declines further work-up at present time BUT will agree at today's visit Noted remains on Plaquenil  long-term methotrexate and now OFF recommended COVID vaccine and RSV Pneumonia  vaccine up  to  date Maintain up-to-date cardiology visits

## 2023-12-28 NOTE — REVIEW OF SYSTEMS
[Fever] : no fever [Fatigue] : fatigue [Chills] : no chills [Poor Appetite] : no poor appetite [Seasonal Allergies] : seasonal allergies [Angioedema] : no angioedema [Depression] : no depression [Anxiety] : no anxiety [Negative] : Neurologic [TextBox_30] : HPI [TextBox_44] : HTN, HLD [TextBox_83] : CKI [TextBox_94] : Pos RA, Gout [TextBox_113] : Folic acid def [TextBox_144] : Hypercalcemia

## 2023-12-28 NOTE — PROCEDURE
[FreeTextEntry1] : PFT September 28, 2023 Flow rates normal range Ratio 77 Lung volumes normal TLC 95% predicted Diffusion 72% predicted which is normal range Hemoglobin 12.4 Stable pulmonary physiology  Pulmonary 6-minute walk exercise study Indication exertional dyspnea Baseline O2 saturation 98% Negative desaturation to 90% This does not qualify for portable oxygen therapy   PFT June 30, 2023 Flow rates normal FEV1 94% predicted Lung volumes normal TLC 23% predicted Decreased ERV secondary to truncal obesity Diffusion normal range 75% predicted Hemoglobin 12.4 Overall stable pulmonary physiology  Chest x-ray PA lateral June 30, 2023 Borderline cardiomegaly Lung fields are clear No parenchymal infiltrates pleural effusions dominant pulmonary nodules Soft tissue bony structures unremarkable Mediastinum unremarkable Lamination right hemidiaphragm No interval change dating back to chest x-ray June 16, 2022  PFT 10/12/22 flow rates nl Lung Volumes nl no  airtrapping  DLCO 85 % WNL  resistance and sp conductance nl stable pulmonary physiology  PFT no bronchodilator June 16, 2022 Flow rates normal Lung volumes normal TLC 93% predicted Diffusion 67% of predicted with a mild loss of functioning alveolar capillary units Clinical correlation  Chest x-ray PA lateral June 16, 2022 reported left lower lobe pneumonia No prior films available for review Globular cardiac size but not grossly enlarged borderline Lamination right hemidiaphragm No evidence of infiltrates bilateral Left lower lobe clear  High-dose flu vaccine September 28, 2023

## 2024-01-18 ENCOUNTER — APPOINTMENT (OUTPATIENT)
Dept: PULMONOLOGY | Facility: CLINIC | Age: 74
End: 2024-01-18
Payer: MEDICARE

## 2024-01-20 PROCEDURE — 95800 SLP STDY UNATTENDED: CPT

## 2024-01-21 PROCEDURE — 95800 SLP STDY UNATTENDED: CPT

## 2024-01-23 ENCOUNTER — NON-APPOINTMENT (OUTPATIENT)
Age: 74
End: 2024-01-23

## 2024-01-31 ENCOUNTER — RX RENEWAL (OUTPATIENT)
Age: 74
End: 2024-01-31

## 2024-02-01 ENCOUNTER — APPOINTMENT (OUTPATIENT)
Dept: CARDIOLOGY | Facility: CLINIC | Age: 74
End: 2024-02-01
Payer: MEDICARE

## 2024-02-01 ENCOUNTER — APPOINTMENT (OUTPATIENT)
Dept: PULMONOLOGY | Facility: CLINIC | Age: 74
End: 2024-02-01
Payer: MEDICARE

## 2024-02-01 VITALS
BODY MASS INDEX: 41.14 KG/M2 | DIASTOLIC BLOOD PRESSURE: 70 MMHG | WEIGHT: 256 LBS | HEIGHT: 66 IN | HEART RATE: 93 BPM | SYSTOLIC BLOOD PRESSURE: 140 MMHG | OXYGEN SATURATION: 99 % | TEMPERATURE: 97.7 F

## 2024-02-01 VITALS — HEART RATE: 71 BPM | DIASTOLIC BLOOD PRESSURE: 63 MMHG | SYSTOLIC BLOOD PRESSURE: 102 MMHG | OXYGEN SATURATION: 97 %

## 2024-02-01 DIAGNOSIS — E66.3 OVERWEIGHT: ICD-10-CM

## 2024-02-01 DIAGNOSIS — E66.9 OVERWEIGHT: ICD-10-CM

## 2024-02-01 DIAGNOSIS — R53.83 OTHER FATIGUE: ICD-10-CM

## 2024-02-01 PROCEDURE — 93000 ELECTROCARDIOGRAM COMPLETE: CPT

## 2024-02-01 PROCEDURE — 99214 OFFICE O/P EST MOD 30 MIN: CPT | Mod: 25

## 2024-02-01 PROCEDURE — 99214 OFFICE O/P EST MOD 30 MIN: CPT

## 2024-02-01 NOTE — HISTORY OF PRESENT ILLNESS
[Never] : never [TextBox_4] : 71-year-old female f/u sleep evaulation occ dry  cough  no active complaints on RA Resp sxs Patient was diagnosed June 6, 2022 at [facility] emergency department with left lower lobe pneumonia Treatment included Zithromax and Augmentin completed She had some ongoing symptoms feeling weak lightheaded dizzy shortness of breath Urgent care center to the emergency department Reported a chest x-ray with a left lower lobe pneumonia although films not available for review Questionable positive wheeze No sputum production no hemoptysis No fevers chills at present Also had some associated mild atypical chest congestion Significant past medical history rheumatoid arthritis Hyperlipidemia Hypertension Obstructive sleep apnea without active treatment Non-smoker No history of chemical toxic inhalational exposures Patient denies history of a COVID-19 infection Should be noted at the emergency department flu and COVID-19 PCR both negative

## 2024-02-01 NOTE — HISTORY OF PRESENT ILLNESS
[FreeTextEntry1] : This is a 73 year y/o female with a PMHx of CKD, HTN, pulm HTN presents today for follow up. Pt reports her SUGGS has been stable. Pt recently had labs checked with elevated to ProBNP to 849 from 724. She has been taking Lasix 40mg in am and 20mg in pm and Spironolactone 25mg in am.  Patient denies chest pain, dyspnea, palpitations, dizziness, syncope, changes in bowel/bladder habits or appetite

## 2024-02-01 NOTE — PROCEDURE
[FreeTextEntry1] : Sleep study January 20 and January 21, 2024 Mild obstructive sleep apnea Note nonpositional 1% time less than 90% on room air Addressed with patient at office follow-up treatment protocols focus primarily on CPAP based on symptoms  PFT September 28, 2023 Flow rates normal range Ratio 77 Lung volumes normal TLC 95% predicted Diffusion 72% predicted which is normal range Hemoglobin 12.4 Stable pulmonary physiology  Pulmonary 6-minute walk exercise study Indication exertional dyspnea Baseline O2 saturation 98% Negative desaturation to 90% This does not qualify for portable oxygen therapy   PFT June 30, 2023 Flow rates normal FEV1 94% predicted Lung volumes normal TLC 23% predicted Decreased ERV secondary to truncal obesity Diffusion normal range 75% predicted Hemoglobin 12.4 Overall stable pulmonary physiology  Chest x-ray PA lateral June 30, 2023 Borderline cardiomegaly Lung fields are clear No parenchymal infiltrates pleural effusions dominant pulmonary nodules Soft tissue bony structures unremarkable Mediastinum unremarkable Lamination right hemidiaphragm No interval change dating back to chest x-ray June 16, 2022  PFT 10/12/22 flow rates nl Lung Volumes nl no  airtrapping  DLCO 85 % WNL  resistance and sp conductance nl stable pulmonary physiology  PFT no bronchodilator June 16, 2022 Flow rates normal Lung volumes normal TLC 93% predicted Diffusion 67% of predicted with a mild loss of functioning alveolar capillary units Clinical correlation  Chest x-ray PA lateral June 16, 2022 reported left lower lobe pneumonia No prior films available for review Globular cardiac size but not grossly enlarged borderline Lamination right hemidiaphragm No evidence of infiltrates bilateral Left lower lobe clear  High-dose flu vaccine September 28, 2023

## 2024-02-01 NOTE — DISCUSSION/SUMMARY
[FreeTextEntry1] : Sleep study January 20 and January 21, 2024 Mild obstructive sleep apnea Note nonpositional 1% time less than 90% on room air Addressed with patient at office follow-up treatment protocols focus primarily on CPAP based on symptoms set UP  AUTO Set 6-16 cm H20 Goals usage 70 % hours > 4 AHI < 5 f/u 4 - 6 weeks Mild chronic dyspnea better Rule out physical deconditioning with overweight associated No strong evidence for pulmonary parenchymal lung disease stable pul physiology Weight loss walking exercise Follow-up cardiology noted We discussed the issue of sleep apnea management which can contribute to dyspnea although patient states she declines further work-up at present time BUT will agree at today's visit Noted remains on Plaquenil  long-term methotrexate and now OFF recommended COVID vaccine and RSV Pneumonia  vaccine up  to  date Maintain up-to-date cardiology visits

## 2024-02-08 ENCOUNTER — APPOINTMENT (OUTPATIENT)
Dept: INTERNAL MEDICINE | Facility: CLINIC | Age: 74
End: 2024-02-08
Payer: MEDICARE

## 2024-02-08 ENCOUNTER — APPOINTMENT (OUTPATIENT)
Dept: ENDOCRINOLOGY | Facility: CLINIC | Age: 74
End: 2024-02-08
Payer: MEDICARE

## 2024-02-08 VITALS
OXYGEN SATURATION: 99 % | BODY MASS INDEX: 40.82 KG/M2 | TEMPERATURE: 98.3 F | HEIGHT: 66 IN | WEIGHT: 254 LBS | SYSTOLIC BLOOD PRESSURE: 130 MMHG | DIASTOLIC BLOOD PRESSURE: 70 MMHG | HEART RATE: 67 BPM

## 2024-02-08 VITALS
OXYGEN SATURATION: 96 % | HEART RATE: 66 BPM | WEIGHT: 254.31 LBS | HEIGHT: 66 IN | BODY MASS INDEX: 40.87 KG/M2 | DIASTOLIC BLOOD PRESSURE: 82 MMHG | SYSTOLIC BLOOD PRESSURE: 138 MMHG

## 2024-02-08 DIAGNOSIS — E83.52 HYPERCALCEMIA: ICD-10-CM

## 2024-02-08 DIAGNOSIS — E55.9 VITAMIN D DEFICIENCY, UNSPECIFIED: ICD-10-CM

## 2024-02-08 PROCEDURE — 99214 OFFICE O/P EST MOD 30 MIN: CPT

## 2024-02-08 PROCEDURE — G2211 COMPLEX E/M VISIT ADD ON: CPT

## 2024-02-09 LAB
ANION GAP SERPL CALC-SCNC: 12 MMOL/L
BUN SERPL-MCNC: 31 MG/DL
CALCIUM SERPL-MCNC: 10.2 MG/DL
CHLORIDE SERPL-SCNC: 103 MMOL/L
CO2 SERPL-SCNC: 26 MMOL/L
CREAT SERPL-MCNC: 1.17 MG/DL
EGFR: 49 ML/MIN/1.73M2
GLUCOSE SERPL-MCNC: 93 MG/DL
NT-PROBNP SERPL-MCNC: 478 PG/ML
POTASSIUM SERPL-SCNC: 4.2 MMOL/L
SODIUM SERPL-SCNC: 141 MMOL/L

## 2024-02-09 NOTE — HISTORY OF PRESENT ILLNESS
[FreeTextEntry1] : 1 week f/u [de-identified] : This is a 74 y/o female with a PMHx of CKD, HTN, pulm HTN presents today for 1 week follow up.  Saw Dr. Corley on 2/1 and Pt was fluid overloaded and probnp went up. He increased her lasix to 40mg BID from once a day. Since then she has been urinating more and notes an improvement in LE swelling and her breathing. Denies chest pain, SOB, dizziness, diaphoresis, palpitations, LE swelling, orthopnea, syncope, n/v, headache.

## 2024-02-09 NOTE — HEALTH RISK ASSESSMENT
[0] : 2) Feeling down, depressed, or hopeless: Not at all (0) [PHQ-2 Negative - No further assessment needed] : PHQ-2 Negative - No further assessment needed [Never] : Never [[id number]] : 0

## 2024-02-09 NOTE — PHYSICAL EXAM
[Post Op: _________] : a [unfilled] post op visit [No Acute Distress] : no acute distress [Well Nourished] : well nourished [Well Developed] : well developed [Well-Appearing] : well-appearing [Normal Sclera/Conjunctiva] : normal sclera/conjunctiva [Normal Outer Ear/Nose] : the outer ears and nose were normal in appearance [Normal Oropharynx] : the oropharynx was normal [No JVD] : no jugular venous distention [No Lymphadenopathy] : no lymphadenopathy [Supple] : supple [No Respiratory Distress] : no respiratory distress  [No Accessory Muscle Use] : no accessory muscle use [Clear to Auscultation] : lungs were clear to auscultation bilaterally [Normal Rate] : normal rate  [Regular Rhythm] : with a regular rhythm [Normal S1, S2] : normal S1 and S2 [No Murmur] : no murmur heard [No Carotid Bruits] : no carotid bruits [No Varicosities] : no varicosities [Pedal Pulses Present] : the pedal pulses are present [No Extremity Clubbing/Cyanosis] : no extremity clubbing/cyanosis [Soft] : abdomen soft [Non Tender] : non-tender [Non-distended] : non-distended [Normal Bowel Sounds] : normal bowel sounds [Normal Anterior Cervical Nodes] : no anterior cervical lymphadenopathy [No CVA Tenderness] : no CVA  tenderness [No Spinal Tenderness] : no spinal tenderness [No Joint Swelling] : no joint swelling [Grossly Normal Strength/Tone] : grossly normal strength/tone [No Rash] : no rash [Coordination Grossly Intact] : coordination grossly intact [No Focal Deficits] : no focal deficits [Normal Gait] : normal gait [Alert and Oriented x3] : oriented to person, place, and time [de-identified] : 1+ pitting edema longterm up bilaterally, L>R

## 2024-02-11 PROBLEM — E55.9 VITAMIN D DEFICIENCY: Status: ACTIVE | Noted: 2017-10-27

## 2024-02-11 NOTE — HISTORY OF PRESENT ILLNESS
[FreeTextEntry1] : Ms. SILVERIO is a 73-year-old female who presents for initial endocrine evaluation. She presents with regard to a history of Pre Dm and Obesity. Over the last several years HbA1c has been in the 5.7 -5.9 % range. Latest A1c: 5.9% on 2/1/24  She too presents with history of difficulty losing weight. Her goal is to lose 20 lbs. Current weight: 254 lbs. She would like to lose weight to improve knee pain. She has been told she needs b/l knee replacements, but she is reluctant to pursue surgery. Currently walks with a cane for stability. Is not currently physically active.   Calcium was upper-normal on 1/15/24 at 10.5. On repeat 2/1/24, was 10.0. No history of kidney stones. Never had DEXA scan.   Additional medical history includes that of Hypercalcemia, hyperlipidemia, hypertension, LUCIANO, RA, and vitamin d deficiency, CHF Medications: Allopurinol 100 mg, Amlodipine 10 mg, Atorvastatin 10 mg, furosemide 40 mg, Spironolactone 25 mg, vitamin D 2,000 IU daily    No family hx of diabetes.

## 2024-02-12 LAB
ALBUPE: 17.4 %
ALPHA1UPE: 18.4 %
ALPHA2UPE: 24.8 %
BETAUPE: 10 %
GAMMAUPE: 29.4 %
IGA 24H UR QL IFE: NORMAL
KAPPA LC 24H UR QL: NORMAL
PROT PATTERN 24H UR ELPH-IMP: NORMAL
PROT UR-MCNC: 5 MG/DL
PROT UR-MCNC: 5 MG/DL

## 2024-02-22 ENCOUNTER — INPATIENT (INPATIENT)
Facility: HOSPITAL | Age: 74
LOS: 5 days | Discharge: HOME | DRG: 271 | End: 2024-02-28
Attending: HOSPITALIST | Admitting: STUDENT IN AN ORGANIZED HEALTH CARE EDUCATION/TRAINING PROGRAM
Payer: MEDICARE

## 2024-02-22 ENCOUNTER — OUTPATIENT (OUTPATIENT)
Dept: OUTPATIENT SERVICES | Facility: HOSPITAL | Age: 74
LOS: 1 days | End: 2024-02-22
Payer: MEDICARE

## 2024-02-22 ENCOUNTER — NON-APPOINTMENT (OUTPATIENT)
Age: 74
End: 2024-02-22

## 2024-02-22 ENCOUNTER — APPOINTMENT (OUTPATIENT)
Dept: INTERNAL MEDICINE | Facility: CLINIC | Age: 74
End: 2024-02-22
Payer: MEDICARE

## 2024-02-22 ENCOUNTER — APPOINTMENT (OUTPATIENT)
Dept: ULTRASOUND IMAGING | Facility: IMAGING CENTER | Age: 74
End: 2024-02-22
Payer: MEDICARE

## 2024-02-22 ENCOUNTER — APPOINTMENT (OUTPATIENT)
Dept: CARDIOLOGY | Facility: CLINIC | Age: 74
End: 2024-02-22
Payer: MEDICARE

## 2024-02-22 VITALS
DIASTOLIC BLOOD PRESSURE: 61 MMHG | SYSTOLIC BLOOD PRESSURE: 136 MMHG | HEIGHT: 66 IN | TEMPERATURE: 98 F | HEART RATE: 88 BPM | RESPIRATION RATE: 20 BRPM | OXYGEN SATURATION: 97 % | WEIGHT: 250 LBS

## 2024-02-22 VITALS
OXYGEN SATURATION: 98 % | HEART RATE: 80 BPM | BODY MASS INDEX: 40.5 KG/M2 | WEIGHT: 252 LBS | DIASTOLIC BLOOD PRESSURE: 70 MMHG | HEIGHT: 66 IN | SYSTOLIC BLOOD PRESSURE: 120 MMHG | TEMPERATURE: 97.9 F

## 2024-02-22 DIAGNOSIS — R60.0 LOCALIZED EDEMA: ICD-10-CM

## 2024-02-22 LAB
ALBUMIN SERPL ELPH-MCNC: 4.2 G/DL — SIGNIFICANT CHANGE UP (ref 3.3–5)
ALP SERPL-CCNC: 99 U/L — SIGNIFICANT CHANGE UP (ref 40–120)
ALT FLD-CCNC: 13 U/L — SIGNIFICANT CHANGE UP (ref 10–45)
ANION GAP SERPL CALC-SCNC: 11 MMOL/L — SIGNIFICANT CHANGE UP (ref 5–17)
ANION GAP SERPL CALC-SCNC: 12 MMOL/L
APTT BLD: 34.2 SEC — SIGNIFICANT CHANGE UP (ref 24.5–35.6)
AST SERPL-CCNC: 19 U/L — SIGNIFICANT CHANGE UP (ref 10–40)
BASOPHILS # BLD AUTO: 0.03 K/UL — SIGNIFICANT CHANGE UP (ref 0–0.2)
BASOPHILS NFR BLD AUTO: 0.3 % — SIGNIFICANT CHANGE UP (ref 0–2)
BILIRUB SERPL-MCNC: 0.5 MG/DL — SIGNIFICANT CHANGE UP (ref 0.2–1.2)
BUN SERPL-MCNC: 27 MG/DL
BUN SERPL-MCNC: 27 MG/DL — HIGH (ref 7–23)
CALCIUM SERPL-MCNC: 10.4 MG/DL — SIGNIFICANT CHANGE UP (ref 8.4–10.5)
CALCIUM SERPL-MCNC: 9.9 MG/DL
CHLORIDE SERPL-SCNC: 102 MMOL/L
CHLORIDE SERPL-SCNC: 104 MMOL/L — SIGNIFICANT CHANGE UP (ref 96–108)
CO2 SERPL-SCNC: 26 MMOL/L
CO2 SERPL-SCNC: 28 MMOL/L — SIGNIFICANT CHANGE UP (ref 22–31)
CREAT SERPL-MCNC: 1.3 MG/DL
CREAT SERPL-MCNC: 1.34 MG/DL — HIGH (ref 0.5–1.3)
EGFR: 41 ML/MIN/1.73M2 — LOW
EGFR: 43 ML/MIN/1.73M2
EOSINOPHIL # BLD AUTO: 0.1 K/UL — SIGNIFICANT CHANGE UP (ref 0–0.5)
EOSINOPHIL NFR BLD AUTO: 1 % — SIGNIFICANT CHANGE UP (ref 0–6)
GLUCOSE SERPL-MCNC: 100 MG/DL
GLUCOSE SERPL-MCNC: 132 MG/DL — HIGH (ref 70–99)
HCT VFR BLD CALC: 42 % — SIGNIFICANT CHANGE UP (ref 34.5–45)
HGB BLD-MCNC: 13.5 G/DL — SIGNIFICANT CHANGE UP (ref 11.5–15.5)
IMM GRANULOCYTES NFR BLD AUTO: 0.4 % — SIGNIFICANT CHANGE UP (ref 0–0.9)
INR BLD: 1.21 RATIO — HIGH (ref 0.85–1.18)
LYMPHOCYTES # BLD AUTO: 1.92 K/UL — SIGNIFICANT CHANGE UP (ref 1–3.3)
LYMPHOCYTES # BLD AUTO: 18.4 % — SIGNIFICANT CHANGE UP (ref 13–44)
MCHC RBC-ENTMCNC: 30.1 PG — SIGNIFICANT CHANGE UP (ref 27–34)
MCHC RBC-ENTMCNC: 32.1 GM/DL — SIGNIFICANT CHANGE UP (ref 32–36)
MCV RBC AUTO: 93.5 FL — SIGNIFICANT CHANGE UP (ref 80–100)
MONOCYTES # BLD AUTO: 0.53 K/UL — SIGNIFICANT CHANGE UP (ref 0–0.9)
MONOCYTES NFR BLD AUTO: 5.1 % — SIGNIFICANT CHANGE UP (ref 2–14)
NEUTROPHILS # BLD AUTO: 7.83 K/UL — HIGH (ref 1.8–7.4)
NEUTROPHILS NFR BLD AUTO: 74.8 % — SIGNIFICANT CHANGE UP (ref 43–77)
NRBC # BLD: 0 /100 WBCS — SIGNIFICANT CHANGE UP (ref 0–0)
NT-PROBNP SERPL-MCNC: 525 PG/ML
PLATELET # BLD AUTO: 235 K/UL — SIGNIFICANT CHANGE UP (ref 150–400)
POTASSIUM SERPL-MCNC: 3.9 MMOL/L — SIGNIFICANT CHANGE UP (ref 3.5–5.3)
POTASSIUM SERPL-SCNC: 3.9 MMOL/L — SIGNIFICANT CHANGE UP (ref 3.5–5.3)
POTASSIUM SERPL-SCNC: 4.1 MMOL/L
PROT SERPL-MCNC: 7.7 G/DL — SIGNIFICANT CHANGE UP (ref 6–8.3)
PROTHROM AB SERPL-ACNC: 12.6 SEC — SIGNIFICANT CHANGE UP (ref 9.5–13)
RBC # BLD: 4.49 M/UL — SIGNIFICANT CHANGE UP (ref 3.8–5.2)
RBC # FLD: 13.2 % — SIGNIFICANT CHANGE UP (ref 10.3–14.5)
SODIUM SERPL-SCNC: 140 MMOL/L
SODIUM SERPL-SCNC: 143 MMOL/L — SIGNIFICANT CHANGE UP (ref 135–145)
WBC # BLD: 10.45 K/UL — SIGNIFICANT CHANGE UP (ref 3.8–10.5)
WBC # FLD AUTO: 10.45 K/UL — SIGNIFICANT CHANGE UP (ref 3.8–10.5)

## 2024-02-22 PROCEDURE — 71275 CT ANGIOGRAPHY CHEST: CPT | Mod: 26,MC

## 2024-02-22 PROCEDURE — 99215 OFFICE O/P EST HI 40 MIN: CPT

## 2024-02-22 PROCEDURE — 93970 EXTREMITY STUDY: CPT | Mod: 26

## 2024-02-22 PROCEDURE — 99285 EMERGENCY DEPT VISIT HI MDM: CPT

## 2024-02-22 PROCEDURE — 93970 EXTREMITY STUDY: CPT

## 2024-02-22 PROCEDURE — G2211 COMPLEX E/M VISIT ADD ON: CPT

## 2024-02-22 RX ORDER — HEPARIN SODIUM 5000 [USP'U]/ML
INJECTION INTRAVENOUS; SUBCUTANEOUS
Qty: 25000 | Refills: 0 | Status: DISCONTINUED | OUTPATIENT
Start: 2024-02-22 | End: 2024-02-27

## 2024-02-22 RX ORDER — HEPARIN SODIUM 5000 [USP'U]/ML
9000 INJECTION INTRAVENOUS; SUBCUTANEOUS ONCE
Refills: 0 | Status: DISCONTINUED | OUTPATIENT
Start: 2024-02-22 | End: 2024-02-27

## 2024-02-22 RX ORDER — HEPARIN SODIUM 5000 [USP'U]/ML
4500 INJECTION INTRAVENOUS; SUBCUTANEOUS EVERY 6 HOURS
Refills: 0 | Status: DISCONTINUED | OUTPATIENT
Start: 2024-02-22 | End: 2024-02-27

## 2024-02-22 RX ORDER — HEPARIN SODIUM 5000 [USP'U]/ML
9000 INJECTION INTRAVENOUS; SUBCUTANEOUS EVERY 6 HOURS
Refills: 0 | Status: DISCONTINUED | OUTPATIENT
Start: 2024-02-22 | End: 2024-02-27

## 2024-02-22 NOTE — ED PROVIDER NOTE - PHYSICAL EXAMINATION
Physical Exam:  Gen: NAD, AOx3, non-toxic appearing, able to ambulate without assistance  Head: NCAT  HEENT: EOMI, PEERLA, normal conjunctiva, tongue midline, oral mucosa moist  Lung: CTAB, no respiratory distress, no wheezes/rhonchi/rales B/L, speaking in full sentences  CV: RRR  Abd: soft, NT, ND, no guarding, no rigidity, no rebound tenderness, no CVA tenderness   MSK: no visible deformities, ROM normal in UE/LE, no back pain  Neuro: No focal sensory or motor deficits  Skin: Warm, well perfused, no rash, bilateral lower ext. swelling

## 2024-02-22 NOTE — ED PROVIDER NOTE - CLINICAL SUMMARY MEDICAL DECISION MAKING FREE TEXT BOX
Attending note.  Patient was seen in room #15 on the right.  Patient is complaining of painful swelling of the right leg for 1 month.  She was seen by her internist and Dr. Corley today and sent for imaging.  Approximately 4:00 today, patient found to have a DVT in the right leg extending into the iliac and femoral vessels.  Patient denies any chest pain, palpitations, shortness of breath, dyspnea on exertion, near-syncope or syncope.  She has no previous injury to the leg.  There is no family history of coagulopathy.  She has past medical history of pulmonary hypertension, hypertension and CKD.  She denies any allergies to medication.  She has no prior history of CAD, cancer or stroke.     ROS - as above, else negative  P/E - alert, NAD, no pallor or jaundice, skin - warm and dry, Lungs - clear, no w/r/r, good BS bilaterally, Cor - rr, no M, no rub, Abdo - obese, ND, soft, NT, no HSM, no CVAT, no guarding or rebound. Extremities - left - no edema, no calf tenderness, distal pulses intact and symmetrical, Right - swelling and tenderness in calf and knee (hx OA knee),  leg warm.  no cyanosis.  Neuro - intact and non-focal    A/P - DVT right leg with extension into the iliac and femoral vessels.  Although patient has no pulmonary symptoms consistent with PE, ultrasound shows mobile clot.  CT pulmonary angio to rule out PE.

## 2024-02-22 NOTE — ED ADULT NURSE NOTE - OBJECTIVE STATEMENT
72 yo female PMH HTN, CKD, A&ox3, presents to ED c/o right leg pain.  As per pt, had 2 ultrasounds done today and was sent in by her PCP Jory for further treatment and evaluation as the US showed a DVT extending from femoral to iliac vessels. BReathing even and unlabored, abdomen soft nontender, no pedal edema. Pt denies chest pain, palpitations, shortness of breath, headache, visual disturbances, numbness/tingling, fever, chills, diaphoresis,  nausea, vomiting, constipation, diarrhea, or urinary symptoms. PT uses a cane to walk at baseline.

## 2024-02-22 NOTE — ED PROVIDER NOTE - OBJECTIVE STATEMENT
Patient is a 73y female with pmhx of HTN, HLD, CKD3, LUCIANO who presents to the ED with RLE swelling and pain. Patient had an outpatient ultrasound which found DVT in the RLE. Patient was sent in by Dr. Corley. Patient denies any shortness of breath worse than baseline, chest pain, fever, cough, hemoptysis, dizziness. Patient denies any urinary symptoms.

## 2024-02-22 NOTE — ED ADULT NURSE NOTE - NSFALLUNIVINTERV_ED_ALL_ED
Bed/Stretcher in lowest position, wheels locked, appropriate side rails in place/Call bell, personal items and telephone in reach/Instruct patient to call for assistance before getting out of bed/chair/stretcher/Non-slip footwear applied when patient is off stretcher/[place] to call system/Physically safe environment - no spills, clutter or unnecessary equipment/Purposeful proactive rounding/Room/bathroom lighting operational, light cord in reach

## 2024-02-22 NOTE — REVIEW OF SYSTEMS
Please call the patient with results.  CT scan of the chest showed  emphysematous changes(-changes due to long-term smoking.)  It also showed a lobular density in the left upper lobe ~ 7mm in size, and additional  4 mm pulmonary nodule in the right upper lobe, 3 mm pulmonary nodule in the right middle lobe, and some scar tissue in the left lung base,   Recommend further evaluation/follow-up imaging as per pulmonology. Please send a referral    There is also an incidental left adrenal mass 2.4. Incidentally noted masses are usually benign, recommend follow with endocrinology for completing evaluation   [Negative] : Heme/Lymph

## 2024-02-22 NOTE — ED ADULT TRIAGE NOTE - PRO INTERPRETER NEED 2
Final Anesthesia Post-op Assessment    Patient: Bhavana Ferreira  Procedure(s) Performed: CATARACT EXTRACTION WITH INTRAOCULAR LENS IMPLANTATION - RIGHT EYE - RIGHT  Anesthesia type: MAC    Vitals Value Taken Time   Temp 36.5 °C (97.7 °F) 03/17/21 1424   Pulse 74 03/17/21 1424   Resp 18 03/17/21 1424   SpO2 98 % 03/17/21 1424   /79 03/17/21 1424         Patient Location: PACU Phase 2  Post-op Vital Signs:stable  Level of Consciousness: participates in exam, awake, oriented, alert and answers questions appropriately  Respiratory Status: spontaneous ventilation  Cardiovascular blood pressure returned to baseline  Hydration: euvolemic  Pain Management: well controlled Nausea: None  Airway Patency:patent  Post-op Assessment: awake, alert, appropriately conversant, or baseline, no complications, patient tolerated procedure well with no complications and no evidence of recall      No complications documented.   
English

## 2024-02-23 ENCOUNTER — RESULT REVIEW (OUTPATIENT)
Age: 74
End: 2024-02-23

## 2024-02-23 DIAGNOSIS — N18.30 CHRONIC KIDNEY DISEASE, STAGE 3 UNSPECIFIED: ICD-10-CM

## 2024-02-23 DIAGNOSIS — I50.9 HEART FAILURE, UNSPECIFIED: ICD-10-CM

## 2024-02-23 DIAGNOSIS — I82.409 ACUTE EMBOLISM AND THROMBOSIS OF UNSPECIFIED DEEP VEINS OF UNSPECIFIED LOWER EXTREMITY: ICD-10-CM

## 2024-02-23 DIAGNOSIS — G47.33 OBSTRUCTIVE SLEEP APNEA (ADULT) (PEDIATRIC): ICD-10-CM

## 2024-02-23 DIAGNOSIS — I10 ESSENTIAL (PRIMARY) HYPERTENSION: ICD-10-CM

## 2024-02-23 DIAGNOSIS — M06.9 RHEUMATOID ARTHRITIS, UNSPECIFIED: ICD-10-CM

## 2024-02-23 DIAGNOSIS — E78.5 HYPERLIPIDEMIA, UNSPECIFIED: ICD-10-CM

## 2024-02-23 DIAGNOSIS — I80.219 PHLEBITIS AND THROMBOPHLEBITIS OF UNSPECIFIED ILIAC VEIN: ICD-10-CM

## 2024-02-23 DIAGNOSIS — I26.99 OTHER PULMONARY EMBOLISM WITHOUT ACUTE COR PULMONALE: ICD-10-CM

## 2024-02-23 DIAGNOSIS — Z90.710 ACQUIRED ABSENCE OF BOTH CERVIX AND UTERUS: Chronic | ICD-10-CM

## 2024-02-23 DIAGNOSIS — Z98.890 OTHER SPECIFIED POSTPROCEDURAL STATES: Chronic | ICD-10-CM

## 2024-02-23 LAB
ANION GAP SERPL CALC-SCNC: 13 MMOL/L — SIGNIFICANT CHANGE UP (ref 5–17)
APTT BLD: >200 SEC — CRITICAL HIGH (ref 24.5–35.6)
APTT BLD: >200 SEC — CRITICAL HIGH (ref 24.5–35.6)
BUN SERPL-MCNC: 27 MG/DL — HIGH (ref 7–23)
CALCIUM SERPL-MCNC: 9.6 MG/DL — SIGNIFICANT CHANGE UP (ref 8.4–10.5)
CHLORIDE SERPL-SCNC: 104 MMOL/L — SIGNIFICANT CHANGE UP (ref 96–108)
CO2 SERPL-SCNC: 26 MMOL/L — SIGNIFICANT CHANGE UP (ref 22–31)
CREAT SERPL-MCNC: 1.22 MG/DL — SIGNIFICANT CHANGE UP (ref 0.5–1.3)
EGFR: 46 ML/MIN/1.73M2 — LOW
GLUCOSE BLDC GLUCOMTR-MCNC: 122 MG/DL — HIGH (ref 70–99)
GLUCOSE BLDC GLUCOMTR-MCNC: 128 MG/DL — HIGH (ref 70–99)
GLUCOSE BLDC GLUCOMTR-MCNC: 135 MG/DL — HIGH (ref 70–99)
GLUCOSE BLDC GLUCOMTR-MCNC: 135 MG/DL — HIGH (ref 70–99)
GLUCOSE SERPL-MCNC: 104 MG/DL — HIGH (ref 70–99)
HCT VFR BLD CALC: 38.5 % — SIGNIFICANT CHANGE UP (ref 34.5–45)
HGB BLD-MCNC: 12.3 G/DL — SIGNIFICANT CHANGE UP (ref 11.5–15.5)
MCHC RBC-ENTMCNC: 30.1 PG — SIGNIFICANT CHANGE UP (ref 27–34)
MCHC RBC-ENTMCNC: 31.9 GM/DL — LOW (ref 32–36)
MCV RBC AUTO: 94.1 FL — SIGNIFICANT CHANGE UP (ref 80–100)
NRBC # BLD: 0 /100 WBCS — SIGNIFICANT CHANGE UP (ref 0–0)
PLATELET # BLD AUTO: 213 K/UL — SIGNIFICANT CHANGE UP (ref 150–400)
POTASSIUM SERPL-MCNC: 3.9 MMOL/L — SIGNIFICANT CHANGE UP (ref 3.5–5.3)
POTASSIUM SERPL-SCNC: 3.9 MMOL/L — SIGNIFICANT CHANGE UP (ref 3.5–5.3)
RBC # BLD: 4.09 M/UL — SIGNIFICANT CHANGE UP (ref 3.8–5.2)
RBC # FLD: 13.2 % — SIGNIFICANT CHANGE UP (ref 10.3–14.5)
SODIUM SERPL-SCNC: 143 MMOL/L — SIGNIFICANT CHANGE UP (ref 135–145)
WBC # BLD: 8.91 K/UL — SIGNIFICANT CHANGE UP (ref 3.8–10.5)
WBC # FLD AUTO: 8.91 K/UL — SIGNIFICANT CHANGE UP (ref 3.8–10.5)

## 2024-02-23 PROCEDURE — 99223 1ST HOSP IP/OBS HIGH 75: CPT

## 2024-02-23 PROCEDURE — 93306 TTE W/DOPPLER COMPLETE: CPT | Mod: 26

## 2024-02-23 PROCEDURE — 99222 1ST HOSP IP/OBS MODERATE 55: CPT | Mod: GC

## 2024-02-23 RX ORDER — SODIUM CHLORIDE 9 MG/ML
1000 INJECTION, SOLUTION INTRAVENOUS
Refills: 0 | Status: DISCONTINUED | OUTPATIENT
Start: 2024-02-23 | End: 2024-02-28

## 2024-02-23 RX ORDER — FUROSEMIDE 40 MG
1 TABLET ORAL
Refills: 0 | DISCHARGE

## 2024-02-23 RX ORDER — SPIRONOLACTONE 25 MG/1
1 TABLET, FILM COATED ORAL
Refills: 0 | DISCHARGE

## 2024-02-23 RX ORDER — INSULIN LISPRO 100/ML
VIAL (ML) SUBCUTANEOUS
Refills: 0 | Status: DISCONTINUED | OUTPATIENT
Start: 2024-02-23 | End: 2024-02-28

## 2024-02-23 RX ORDER — ALLOPURINOL 300 MG
100 TABLET ORAL DAILY
Refills: 0 | Status: DISCONTINUED | OUTPATIENT
Start: 2024-02-23 | End: 2024-02-28

## 2024-02-23 RX ORDER — INSULIN LISPRO 100/ML
VIAL (ML) SUBCUTANEOUS AT BEDTIME
Refills: 0 | Status: DISCONTINUED | OUTPATIENT
Start: 2024-02-23 | End: 2024-02-28

## 2024-02-23 RX ORDER — HYDROXYCHLOROQUINE SULFATE 200 MG
200 TABLET ORAL DAILY
Refills: 0 | Status: DISCONTINUED | OUTPATIENT
Start: 2024-02-23 | End: 2024-02-28

## 2024-02-23 RX ORDER — CHLORHEXIDINE GLUCONATE 213 G/1000ML
1 SOLUTION TOPICAL DAILY
Refills: 0 | Status: DISCONTINUED | OUTPATIENT
Start: 2024-02-23 | End: 2024-02-28

## 2024-02-23 RX ORDER — ATORVASTATIN CALCIUM 80 MG/1
1 TABLET, FILM COATED ORAL
Refills: 0 | DISCHARGE

## 2024-02-23 RX ORDER — ACETAMINOPHEN 500 MG
2 TABLET ORAL
Refills: 0 | DISCHARGE

## 2024-02-23 RX ORDER — AMLODIPINE BESYLATE 2.5 MG/1
1 TABLET ORAL
Refills: 0 | DISCHARGE

## 2024-02-23 RX ORDER — ATORVASTATIN CALCIUM 80 MG/1
10 TABLET, FILM COATED ORAL AT BEDTIME
Refills: 0 | Status: DISCONTINUED | OUTPATIENT
Start: 2024-02-23 | End: 2024-02-28

## 2024-02-23 RX ORDER — DEXTROSE 50 % IN WATER 50 %
25 SYRINGE (ML) INTRAVENOUS ONCE
Refills: 0 | Status: DISCONTINUED | OUTPATIENT
Start: 2024-02-23 | End: 2024-02-28

## 2024-02-23 RX ORDER — ACETAMINOPHEN 500 MG
650 TABLET ORAL EVERY 6 HOURS
Refills: 0 | Status: DISCONTINUED | OUTPATIENT
Start: 2024-02-23 | End: 2024-02-28

## 2024-02-23 RX ORDER — ALLOPURINOL 300 MG
1 TABLET ORAL
Refills: 0 | DISCHARGE

## 2024-02-23 RX ORDER — GLUCAGON INJECTION, SOLUTION 0.5 MG/.1ML
1 INJECTION, SOLUTION SUBCUTANEOUS ONCE
Refills: 0 | Status: DISCONTINUED | OUTPATIENT
Start: 2024-02-23 | End: 2024-02-28

## 2024-02-23 RX ORDER — SPIRONOLACTONE 25 MG/1
25 TABLET, FILM COATED ORAL DAILY
Refills: 0 | Status: DISCONTINUED | OUTPATIENT
Start: 2024-02-23 | End: 2024-02-23

## 2024-02-23 RX ORDER — FUROSEMIDE 40 MG
40 TABLET ORAL
Refills: 0 | Status: DISCONTINUED | OUTPATIENT
Start: 2024-02-23 | End: 2024-02-23

## 2024-02-23 RX ORDER — AMLODIPINE BESYLATE 2.5 MG/1
10 TABLET ORAL DAILY
Refills: 0 | Status: DISCONTINUED | OUTPATIENT
Start: 2024-02-23 | End: 2024-02-28

## 2024-02-23 RX ORDER — DEXTROSE 50 % IN WATER 50 %
12.5 SYRINGE (ML) INTRAVENOUS ONCE
Refills: 0 | Status: DISCONTINUED | OUTPATIENT
Start: 2024-02-23 | End: 2024-02-28

## 2024-02-23 RX ORDER — DEXTROSE 50 % IN WATER 50 %
15 SYRINGE (ML) INTRAVENOUS ONCE
Refills: 0 | Status: DISCONTINUED | OUTPATIENT
Start: 2024-02-23 | End: 2024-02-28

## 2024-02-23 RX ORDER — HYDROXYCHLOROQUINE SULFATE 200 MG
1 TABLET ORAL
Refills: 0 | DISCHARGE

## 2024-02-23 RX ORDER — POTASSIUM CHLORIDE 20 MEQ
1 PACKET (EA) ORAL
Refills: 0 | DISCHARGE

## 2024-02-23 RX ORDER — FUROSEMIDE 40 MG
40 TABLET ORAL
Refills: 0 | Status: DISCONTINUED | OUTPATIENT
Start: 2024-02-23 | End: 2024-02-28

## 2024-02-23 RX ADMIN — Medication 100 MILLIGRAM(S): at 12:04

## 2024-02-23 RX ADMIN — Medication 200 MILLIGRAM(S): at 12:04

## 2024-02-23 RX ADMIN — HEPARIN SODIUM 2000 UNIT(S)/HR: 5000 INJECTION INTRAVENOUS; SUBCUTANEOUS at 00:09

## 2024-02-23 RX ADMIN — HEPARIN SODIUM 1600 UNIT(S)/HR: 5000 INJECTION INTRAVENOUS; SUBCUTANEOUS at 09:33

## 2024-02-23 RX ADMIN — ATORVASTATIN CALCIUM 10 MILLIGRAM(S): 80 TABLET, FILM COATED ORAL at 21:48

## 2024-02-23 RX ADMIN — AMLODIPINE BESYLATE 10 MILLIGRAM(S): 2.5 TABLET ORAL at 17:25

## 2024-02-23 RX ADMIN — HEPARIN SODIUM 0 UNIT(S)/HR: 5000 INJECTION INTRAVENOUS; SUBCUTANEOUS at 17:26

## 2024-02-23 RX ADMIN — Medication 40 MILLIGRAM(S): at 17:25

## 2024-02-23 RX ADMIN — HEPARIN SODIUM 1200 UNIT(S)/HR: 5000 INJECTION INTRAVENOUS; SUBCUTANEOUS at 18:28

## 2024-02-23 RX ADMIN — HEPARIN SODIUM 1200 UNIT(S)/HR: 5000 INJECTION INTRAVENOUS; SUBCUTANEOUS at 19:32

## 2024-02-23 RX ADMIN — HEPARIN SODIUM 0 UNIT(S)/HR: 5000 INJECTION INTRAVENOUS; SUBCUTANEOUS at 08:18

## 2024-02-23 RX ADMIN — HEPARIN SODIUM 9000 UNIT(S): 5000 INJECTION INTRAVENOUS; SUBCUTANEOUS at 00:09

## 2024-02-23 NOTE — H&P ADULT - NSTOBACCOSCREENHP_GEN_A_NCS
[FreeTextEntry1] : 53 years old gentleman , moved to [facility], came for cardiac evaluation.\par \par His history includes CAD status post stenting in the past with residual stenosis, has not been eval by cardiologist last 3 years.  He does not exercise per se but active; does not get exact chest pain but get short of breath very easily.  He denies any PND, orthopnea, diaphoresis, dizziness, palpitation or pedal edema.  First time around the when he stands he is symptoms where decreased exercise tolerance and shortness of breath on exertion and he is experiencing same symptoms at this point, he did not have chest pain at that time.\par \par He is very much concerned about dyslipidemia although he does not follow a low-fat diet well.\par \par His other history includes hypertension, psoriasis, and he had nocturnal seizures followed by neurologist.  His sleep is very much fragmented, multiple sleep study ruled out sleep apnea syndrome as per patient.
No

## 2024-02-23 NOTE — CONSULT NOTE ADULT - SUBJECTIVE AND OBJECTIVE BOX
HPI: 72 yo female PMH HTN, CKD, CHF, Pulm Hypertension presents with right leg pain.  Has been getting diuretics for CHF and today noticed increased swelling of the R leg prompting a visit to the ED.    In the ED, vitals normal. US Duplex significant for right lower extremity including the distal right external iliac vein extending from the calf; thrombus in the right common femoral vein appears mobile. 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 Findings are new since 2/3/2021.    Denies hx of trauma, travel, malignancy, hypercoagulable disorders.     PAST MEDICAL & SURGICAL HISTORY:  HTN (hypertension)    CKD (chronic kidney disease)    REVIEW OF SYSTEMS  above    Allergic/Immunologic:	 No Anaphylaxis/ Intolerance/ Recent illnesses    MEDICATIONS  (STANDING):  heparin   Injectable 9000 Unit(s) IV Push once  heparin  Infusion.  Unit(s)/Hr (20 mL/Hr) IV Continuous <Continuous>    MEDICATIONS  (PRN):  heparin   Injectable 9000 Unit(s) IV Push every 6 hours PRN For aPTT less than 40  heparin   Injectable 4500 Unit(s) IV Push every 6 hours PRN For aPTT between 40 - 57    Allergies  No Known Allergies  Intolerances    FAMILY HISTORY:  unless noted, no significant family hx with Mother, Father, Siblings    Vital Signs Last 24 Hrs  T(C): 36.4 (23 Feb 2024 01:17), Max: 36.7 (22 Feb 2024 20:21)  T(F): 97.6 (23 Feb 2024 01:17), Max: 98 (22 Feb 2024 20:21)  HR: 82 (23 Feb 2024 01:17) (82 - 88)  BP: 134/76 (23 Feb 2024 01:17) (134/76 - 136/61)  BP(mean): --  RR: 18 (23 Feb 2024 01:17) (18 - 20)  SpO2: 100% (23 Feb 2024 01:17) (97% - 100%)    Parameters below as of 23 Feb 2024 01:17  Patient On (Oxygen Delivery Method): room air    General: NAD  Neurology: Awake, AO4  Eyes: Gross vision intact  Resp: breathing comfortably on room air  Extremities: RLE > LLE swelling, no skin changes  R palp femoral, popliteal, and AT/PT pulses  Sensation and motor intact    LABS:                        13.5   10.45 )-----------( 235      ( 22 Feb 2024 21:49 )             42.0     02-22    143  |  104  |  27<H>  ----------------------------<  132<H>  3.9   |  28  |  1.34<H>    Ca    10.4      22 Feb 2024 21:49    TPro  7.7  /  Alb  4.2  /  TBili  0.5  /  DBili  x   /  AST  19  /  ALT  13  /  AlkPhos  99  02-22    PT/INR - ( 22 Feb 2024 21:49 )   PT: 12.6 sec;   INR: 1.21 ratio         PTT - ( 22 Feb 2024 21:49 )  PTT:34.2 sec  Urinalysis Basic - ( 22 Feb 2024 21:49 )    Color: x / Appearance: x / SG: x / pH: x  Gluc: 132 mg/dL / Ketone: x  / Bili: x / Urobili: x   Blood: x / Protein: x / Nitrite: x   Leuk Esterase: x / RBC: x / WBC x   Sq Epi: x / Non Sq Epi: x / Bacteria: x      RADIOLOGY & ADDITIONAL STUDIES:  CT Chest  IMPRESSION:  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 Findings are new since 2/3/2021.    No evidence of additional filling defect within the pulmonary arteries.    --- End of Report ---    US Duplex  IMPRESSION:    Deep venous thrombosis in the right lower extremity including the distal   right external iliac vein extending from the calf; thrombus in the right   common femoral vein appears mobile.    No evidenceof deep venous thrombosis in the left lower extremity.    The findings were discussed with Dr. Lorenzo on 2/22/2024 6:19 PM    --- End of Report ---       HPI: 74 yo female PMH HTN, CKD, CHF, Pulm Hypertension presents with right leg pain.  Has been getting diuretics for CHF and today noticed increased swelling of the R leg prompting a visit to the ED.    In the ED, vitals normal. US Duplex significant for right lower extremity including the distal right external iliac vein extending from the calf; thrombus in the right common femoral vein appears mobile. 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 Findings are new since 2/3/2021.    Pt has been experiencing increased shortness of breath. Denies hx of trauma, travel, malignancy, hypercoagulable disorders.     PAST MEDICAL & SURGICAL HISTORY:  HTN (hypertension)    CKD (chronic kidney disease)    REVIEW OF SYSTEMS  above    Allergic/Immunologic:	 No Anaphylaxis/ Intolerance/ Recent illnesses    MEDICATIONS  (STANDING):  heparin   Injectable 9000 Unit(s) IV Push once  heparin  Infusion.  Unit(s)/Hr (20 mL/Hr) IV Continuous <Continuous>    MEDICATIONS  (PRN):  heparin   Injectable 9000 Unit(s) IV Push every 6 hours PRN For aPTT less than 40  heparin   Injectable 4500 Unit(s) IV Push every 6 hours PRN For aPTT between 40 - 57    Allergies  No Known Allergies  Intolerances    FAMILY HISTORY:  unless noted, no significant family hx with Mother, Father, Siblings    Vital Signs Last 24 Hrs  T(C): 36.4 (23 Feb 2024 01:17), Max: 36.7 (22 Feb 2024 20:21)  T(F): 97.6 (23 Feb 2024 01:17), Max: 98 (22 Feb 2024 20:21)  HR: 82 (23 Feb 2024 01:17) (82 - 88)  BP: 134/76 (23 Feb 2024 01:17) (134/76 - 136/61)  BP(mean): --  RR: 18 (23 Feb 2024 01:17) (18 - 20)  SpO2: 100% (23 Feb 2024 01:17) (97% - 100%)    Parameters below as of 23 Feb 2024 01:17  Patient On (Oxygen Delivery Method): room air    General: NAD  Neurology: Awake, AO4  Eyes: Gross vision intact  Resp: breathing comfortably on room air  Extremities: RLE > LLE swelling, no skin changes  R palp femoral, popliteal, and AT/PT pulses  Pain with R calf squeeze  Sensation and motor intact    LABS:                        13.5   10.45 )-----------( 235      ( 22 Feb 2024 21:49 )             42.0     02-22    143  |  104  |  27<H>  ----------------------------<  132<H>  3.9   |  28  |  1.34<H>    Ca    10.4      22 Feb 2024 21:49    TPro  7.7  /  Alb  4.2  /  TBili  0.5  /  DBili  x   /  AST  19  /  ALT  13  /  AlkPhos  99  02-22    PT/INR - ( 22 Feb 2024 21:49 )   PT: 12.6 sec;   INR: 1.21 ratio         PTT - ( 22 Feb 2024 21:49 )  PTT:34.2 sec  Urinalysis Basic - ( 22 Feb 2024 21:49 )    Color: x / Appearance: x / SG: x / pH: x  Gluc: 132 mg/dL / Ketone: x  / Bili: x / Urobili: x   Blood: x / Protein: x / Nitrite: x   Leuk Esterase: x / RBC: x / WBC x   Sq Epi: x / Non Sq Epi: x / Bacteria: x      RADIOLOGY & ADDITIONAL STUDIES:  CT Chest  IMPRESSION:  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 Findings are new since 2/3/2021.    No evidence of additional filling defect within the pulmonary arteries.    --- End of Report ---    US Duplex  IMPRESSION:    Deep venous thrombosis in the right lower extremity including the distal   right external iliac vein extending from the calf; thrombus in the right   common femoral vein appears mobile.    No evidenceof deep venous thrombosis in the left lower extremity.    The findings were discussed with Dr. Lorenzo on 2/22/2024 6:19 PM    --- End of Report ---       HPI: 74 yo female PMH HTN, CKD, CHF, Pulm Hypertension presents with right leg pain.  Has been getting diuretics for CHF and today noticed increased swelling of the R leg prompting a visit to the ED.    In the ED, vitals normal. US Duplex significant for right lower extremity including the distal right external iliac vein extending from the calf; thrombus in the right common femoral vein appears mobile. 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 Findings are new since 2/3/2021.    Pt has been experiencing increased shortness of breath. Denies hx of trauma, travel, malignancy, hypercoagulable disorders.     PAST MEDICAL & SURGICAL HISTORY:  HTN (hypertension)    CKD (chronic kidney disease)    REVIEW OF SYSTEMS  above    Allergic/Immunologic:	 No Anaphylaxis/ Intolerance/ Recent illnesses    MEDICATIONS  (STANDING):  heparin   Injectable 9000 Unit(s) IV Push once  heparin  Infusion.  Unit(s)/Hr (20 mL/Hr) IV Continuous <Continuous>    MEDICATIONS  (PRN):  heparin   Injectable 9000 Unit(s) IV Push every 6 hours PRN For aPTT less than 40  heparin   Injectable 4500 Unit(s) IV Push every 6 hours PRN For aPTT between 40 - 57    Allergies  No Known Allergies  Intolerances    FAMILY HISTORY:  unless noted, no significant family hx with Mother, Father, Siblings    Vital Signs Last 24 Hrs  T(C): 36.4 (23 Feb 2024 01:17), Max: 36.7 (22 Feb 2024 20:21)  T(F): 97.6 (23 Feb 2024 01:17), Max: 98 (22 Feb 2024 20:21)  HR: 82 (23 Feb 2024 01:17) (82 - 88)  BP: 134/76 (23 Feb 2024 01:17) (134/76 - 136/61)  BP(mean): --  RR: 18 (23 Feb 2024 01:17) (18 - 20)  SpO2: 100% (23 Feb 2024 01:17) (97% - 100%)    Parameters below as of 23 Feb 2024 01:17  Patient On (Oxygen Delivery Method): room air    General: NAD  Neurology: Awake, AO4  Eyes: Gross vision intact  Resp: breathing comfortably on room air  Extremities: RLE > LLE swelling, no skin changes  R palp femoral, popliteal, and AT/PT pulses  Pain with R calf squeeze  Sensation and motor intact    LABS:                        13.5   10.45 )-----------( 235      ( 22 Feb 2024 21:49 )             42.0     02-22    143  |  104  |  27<H>  ----------------------------<  132<H>  3.9   |  28  |  1.34<H>    Ca    10.4      22 Feb 2024 21:49    TPro  7.7  /  Alb  4.2  /  TBili  0.5  /  DBili  x   /  AST  19  /  ALT  13  /  AlkPhos  99  02-22    PT/INR - ( 22 Feb 2024 21:49 )   PT: 12.6 sec;   INR: 1.21 ratio         PTT - ( 22 Feb 2024 21:49 )  PTT:34.2 sec  Urinalysis Basic - ( 22 Feb 2024 21:49 )    Color: x / Appearance: x / SG: x / pH: x  Gluc: 132 mg/dL / Ketone: x  / Bili: x / Urobili: x   Blood: x / Protein: x / Nitrite: x   Leuk Esterase: x / RBC: x / WBC x   Sq Epi: x / Non Sq Epi: x / Bacteria: x      RADIOLOGY & ADDITIONAL STUDIES:  CT Chest  IMPRESSION:  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 Findings are new since 2/3/2021.    No evidence of additional filling defect within the pulmonary arteries.    --- End of Report ---    US Duplex  IMPRESSION:    Deep venous thrombosis in the right lower extremity including the distal   right external iliac vein extending from the calf; thrombus in the right   common femoral vein appears mobile.    No evidence of deep venous thrombosis in the left lower extremity.    The findings were discussed with Dr. Lorenzo on 2/22/2024 6:19 PM    --- End of Report ---

## 2024-02-23 NOTE — CONSULT NOTE ADULT - SUBJECTIVE AND OBJECTIVE BOX
Patient seen and evaluated at bedside    Reason for consult: DVT/PE    HPI:  72 yo female PMH HTN, CKD, CHF, Pulm Hypertension presents with right leg pain.  Has been getting diuretics for CHF and today noticed increased swelling of the R leg prompting a visit to the ED.    In the ED, vitals normal. US Duplex significant for right lower extremity including the distal right external iliac vein extending from the calf; thrombus in the right common femoral vein appears mobile. 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 Findings are new since 2/3/2021.    Pt has been experiencing increased shortness of breath. Denies hx of trauma, travel, malignancy, hypercoagulable disorders.         PMHx:   No pertinent past medical history    HTN (hypertension)    CKD (chronic kidney disease)        PSHx:       Allergies:  No Known Allergies      Home Meds:    Current Medications:   acetaminophen     Tablet .. 650 milliGRAM(s) Oral every 6 hours PRN  allopurinol 100 milliGRAM(s) Oral daily  amLODIPine   Tablet 10 milliGRAM(s) Oral daily  atorvastatin 10 milliGRAM(s) Oral at bedtime  dextrose 5%. 1000 milliLiter(s) IV Continuous <Continuous>  dextrose 5%. 1000 milliLiter(s) IV Continuous <Continuous>  dextrose 50% Injectable 12.5 Gram(s) IV Push once  dextrose 50% Injectable 25 Gram(s) IV Push once  dextrose 50% Injectable 25 Gram(s) IV Push once  dextrose Oral Gel 15 Gram(s) Oral once PRN  furosemide    Tablet 40 milliGRAM(s) Oral two times a day  glucagon  Injectable 1 milliGRAM(s) IntraMuscular once  heparin   Injectable 9000 Unit(s) IV Push every 6 hours PRN  heparin   Injectable 4500 Unit(s) IV Push every 6 hours PRN  heparin   Injectable 9000 Unit(s) IV Push once  heparin  Infusion.  Unit(s)/Hr IV Continuous <Continuous>  hydroxychloroquine 200 milliGRAM(s) Oral daily  insulin lispro (ADMELOG) corrective regimen sliding scale   SubCutaneous at bedtime  insulin lispro (ADMELOG) corrective regimen sliding scale   SubCutaneous three times a day before meals  spironolactone 25 milliGRAM(s) Oral daily      FAMILY HISTORY:      Social History:  Smoking History:  Alcohol Use:  Drug Use:    Review of Systems:  REVIEW OF SYSTEMS:  CONSTITUTIONAL: No weakness, fevers or chills  EYES/ENT: No visual changes;  No dysphagia  NECK: No pain or stiffness  RESPIRATORY: No cough, wheezing, hemoptysis; No shortness of breath  CARDIOVASCULAR: No chest pain or palpitations; No lower extremity edema  GASTROINTESTINAL: No abdominal or epigastric pain. No nausea, vomiting, or hematemesis; No diarrhea or constipation. No melena or hematochezia.  BACK: No back pain  GENITOURINARY: No dysuria, frequency or hematuria  NEUROLOGICAL: No numbness or weakness  SKIN: No itching, burning, rashes, or lesions   All other review of systems is negative unless indicated above.    [ ] All other systems negative  [ ] Unable to assess ROS due to    Physical Exam:  T(F): 97.6 (02-23), Max: 98 (02-22)  HR: 72 (02-23) (72 - 88)  BP: 131/68 (02-23) (130/72 - 136/61)  RR: 18 (02-23)  SpO2: 97% (02-23)  GENERAL: No acute distress, well-developed  HEAD:  Atraumatic, Normocephalic  ENT: EOMI, PERRLA, conjunctiva and sclera clear, Neck supple, No JVD, moist mucosa  CHEST/LUNG: Clear to auscultation bilaterally; No wheeze, equal breath sounds bilaterally   BACK: No spinal tenderness  HEART: Regular rate and rhythm; No murmurs, rubs, or gallops  ABDOMEN: Soft, Nontender, Nondistended; Bowel sounds present  EXTREMITIES:  No clubbing, cyanosis, or edema  PSYCH: Nl behavior, nl affect  NEUROLOGY: AAOx3, non-focal, cranial nerves intact  SKIN: Normal color, No rashes or lesions  LINES:    Cardiovascular Diagnostic Testing:    CXR: Personally reviewed    Labs: Personally reviewed                        12.3   8.91  )-----------( 213      ( 23 Feb 2024 06:01 )             38.5     02-22    143  |  104  |  27<H>  ----------------------------<  132<H>  3.9   |  28  |  1.34<H>    Ca    10.4      22 Feb 2024 21:49    TPro  7.7  /  Alb  4.2  /  TBili  0.5  /  DBili  x   /  AST  19  /  ALT  13  /  AlkPhos  99  02-22    PT/INR - ( 22 Feb 2024 21:49 )   PT: 12.6 sec;   INR: 1.21 ratio         PTT - ( 23 Feb 2024 06:01 )  PTT:>200.0 sec        CT Chest  IMPRESSION:  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 Findings are new since 2/3/2021.    No evidence of additional filling defect within the pulmonary arteries.    --- End of Report ---    US Duplex  IMPRESSION:    Deep venous thrombosis in the right lower extremity including the distal   right external iliac vein extending from the calf; thrombus in the right   common femoral vein appears mobile.    No evidence of deep venous thrombosis in the left lower extremity.    The findings were discussed with Dr. Lorenzo on 2/22/2024 6:19 PM    --- End of Report ---       Patient seen and evaluated at bedside    Reason for consult: DVT/PE    HPI:  74 yo f with h/o pHTN, HTN, HLD, CKD3, RA, LUCIANO, ?CHF presented with RLE swelling and pain for 1 month found to have RLE extensive DVT on outpt study, Patient has had BILATERAL le swelling for about 1 month and has been treated with increased dose of lasix with Dr. Corley/Dr. Lorenzo. Swelling has improved though pt noted with RLE > LLE. some pain in calf and thigh as well though pt is unclear whether that was due to her underlying RA condition.  Patient denies any changes in exercise tolerance (some SUGGS at baseline due to obesity and deconditioning), no chest pain, palpitations, cough or hemoptysis, dizziness. Has had some mild dec in activity level due to overall weakness . no recent viral or illness. afebrile. no bowel or urinary sxs.     In the ED, VS: afebrile, 131/68, 72, 18, 97% RA. resting comfortably without acute complaints.   Started on hep gtt  (23 Feb 2024 09:24)    Vascular cardiology consulted for further management. Pt notes RLE > LLE swelling but denies significant pain or SOB/dyspnea. Has been pt of Dr. Corley's for many years, was told ~December she has pHTN by pulmonologist who she's seen since dx of pneumonia several years ago. No prior h/o DVT/PE, remote surgeries (hysterectomy, umbilical hernia surgery), no recent immobilization or prolonged travel. No known h/o cancer and has been up todate and unremarkable with regards to cancer screening (colonoscopy, mammo, pap smears).     PMHx:   No pertinent past medical history    HTN (hypertension)    CKD (chronic kidney disease)    Pulmonary hypertension    HLD (hyperlipidemia)    RA (rheumatoid arthritis)    LUCIANO (obstructive sleep apnea)    Chronic CHF        PSHx:   S/P hysterectomy    S/P hernia surgery        Allergies:  No Known Allergies      Home Meds:    Current Medications:   acetaminophen     Tablet .. 650 milliGRAM(s) Oral every 6 hours PRN  allopurinol 100 milliGRAM(s) Oral daily  amLODIPine   Tablet 10 milliGRAM(s) Oral daily  atorvastatin 10 milliGRAM(s) Oral at bedtime  dextrose 5%. 1000 milliLiter(s) IV Continuous <Continuous>  dextrose 5%. 1000 milliLiter(s) IV Continuous <Continuous>  dextrose 50% Injectable 12.5 Gram(s) IV Push once  dextrose 50% Injectable 25 Gram(s) IV Push once  dextrose 50% Injectable 25 Gram(s) IV Push once  dextrose Oral Gel 15 Gram(s) Oral once PRN  glucagon  Injectable 1 milliGRAM(s) IntraMuscular once  heparin   Injectable 4500 Unit(s) IV Push every 6 hours PRN  heparin   Injectable 9000 Unit(s) IV Push once  heparin   Injectable 9000 Unit(s) IV Push every 6 hours PRN  heparin  Infusion.  Unit(s)/Hr IV Continuous <Continuous>  hydroxychloroquine 200 milliGRAM(s) Oral daily  insulin lispro (ADMELOG) corrective regimen sliding scale   SubCutaneous three times a day before meals  insulin lispro (ADMELOG) corrective regimen sliding scale   SubCutaneous at bedtime      FAMILY HISTORY:  FH: breast cancer (Mother)        Social History:  Smoking History:  Alcohol Use:  Drug Use:    Review of Systems:  REVIEW OF SYSTEMS:  CONSTITUTIONAL: No weakness, fevers or chills  EYES/ENT: No visual changes;  No dysphagia  NECK: No pain or stiffness  RESPIRATORY: No cough, wheezing, hemoptysis; No shortness of breath  CARDIOVASCULAR: No chest pain or palpitations; No lower extremity edema  GASTROINTESTINAL: No abdominal or epigastric pain. No nausea, vomiting, or hematemesis; No diarrhea or constipation. No melena or hematochezia.  BACK: No back pain  GENITOURINARY: No dysuria, frequency or hematuria  NEUROLOGICAL: No numbness or weakness  SKIN: No itching, burning, rashes, or lesions   All other review of systems is negative unless indicated above.    [ ] All other systems negative  [ ] Unable to assess ROS due to    Physical Exam:  T(F): 98.2 (02-23), Max: 98.2 (02-23)  HR: 77 (02-23) (72 - 88)  BP: 103/66 (02-23) (103/66 - 136/61)  RR: 18 (02-23)  SpO2: 98% (02-23)  GENERAL: No acute distress, well-developed  HEAD:  Atraumatic, Normocephalic  ENT: EOMI, PERRLA, conjunctiva and sclera clear, Neck supple, No JVD, moist mucosa  CHEST/LUNG: Clear to auscultation bilaterally; No wheeze, equal breath sounds bilaterally   BACK: No spinal tenderness  HEART: Regular rate and rhythm; No murmurs, rubs, or gallops  ABDOMEN: Soft, Nontender, Nondistended; Bowel sounds present  EXTREMITIES:  No clubbing, cyanosis, or edema  PSYCH: Nl behavior, nl affect  NEUROLOGY: AAOx3, non-focal, cranial nerves intact  SKIN: Normal color, No rashes or lesions  LINES:    Cardiovascular Diagnostic Testing:    CXR: Personally reviewed    Labs: Personally reviewed                        12.3   8.91  )-----------( 213      ( 23 Feb 2024 06:01 )             38.5     02-22    143  |  104  |  27<H>  ----------------------------<  132<H>  3.9   |  28  |  1.34<H>    Ca    10.4      22 Feb 2024 21:49    TPro  7.7  /  Alb  4.2  /  TBili  0.5  /  DBili  x   /  AST  19  /  ALT  13  /  AlkPhos  99  02-22    PT/INR - ( 22 Feb 2024 21:49 )   PT: 12.6 sec;   INR: 1.21 ratio         PTT - ( 23 Feb 2024 06:01 )  PTT:>200.0 sec      CT Chest  IMPRESSION:  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 Findings are new since 2/3/2021.    No evidence of additional filling defect within the pulmonary arteries.    --- End of Report ---    US Duplex  IMPRESSION:    Deep venous thrombosis in the right lower extremity including the distal   right external iliac vein extending from the calf; thrombus in the right   common femoral vein appears mobile.    No evidence of deep venous thrombosis in the left lower extremity.    The findings were discussed with Dr. Lorenzo on 2/22/2024 6:19 PM    --- End of Report ---       Patient seen and evaluated at bedside    Reason for consult: DVT/PE    HPI:  72 yo f with h/o pHTN, HTN, HLD, CKD3, RA, LUCIANO, ?CHF presented with RLE swelling and pain for 1 month found to have RLE extensive DVT on outpt study, Patient has had BILATERAL le swelling for about 1 month and has been treated with increased dose of lasix with Dr. Corley/Dr. Lorenzo. Swelling has improved though pt noted with RLE > LLE. some pain in calf and thigh as well though pt is unclear whether that was due to her underlying RA condition.  Patient denies any changes in exercise tolerance (some SUGGS at baseline due to obesity and deconditioning), no chest pain, palpitations, cough or hemoptysis, dizziness. Has had some mild dec in activity level due to overall weakness . no recent viral or illness. afebrile. no bowel or urinary sxs.     In the ED, VS: afebrile, 131/68, 72, 18, 97% RA. resting comfortably without acute complaints.   Started on hep gtt  (23 Feb 2024 09:24)    Vascular cardiology consulted for further management. Pt notes RLE > LLE swelling but denies significant pain or SOB/dyspnea. Has been pt of Dr. Corley's for many years, was told ~December she has pHTN by pulmonologist who she's seen since dx of pneumonia several years ago. No prior h/o DVT/PE, remote surgeries (hysterectomy, umbilical hernia surgery), no recent immobilization or prolonged travel. No known h/o cancer and has been up todate and unremarkable with regards to cancer screening (colonoscopy, mammo, pap smears).     PMHx:   No pertinent past medical history    HTN (hypertension)    CKD (chronic kidney disease)    Pulmonary hypertension    HLD (hyperlipidemia)    RA (rheumatoid arthritis)    LUCIANO (obstructive sleep apnea)    Chronic CHF        PSHx:   S/P hysterectomy    S/P hernia surgery        Allergies:  No Known Allergies      Home Meds:    Current Medications:   acetaminophen     Tablet .. 650 milliGRAM(s) Oral every 6 hours PRN  allopurinol 100 milliGRAM(s) Oral daily  amLODIPine   Tablet 10 milliGRAM(s) Oral daily  atorvastatin 10 milliGRAM(s) Oral at bedtime  dextrose 5%. 1000 milliLiter(s) IV Continuous <Continuous>  dextrose 5%. 1000 milliLiter(s) IV Continuous <Continuous>  dextrose 50% Injectable 12.5 Gram(s) IV Push once  dextrose 50% Injectable 25 Gram(s) IV Push once  dextrose 50% Injectable 25 Gram(s) IV Push once  dextrose Oral Gel 15 Gram(s) Oral once PRN  glucagon  Injectable 1 milliGRAM(s) IntraMuscular once  heparin   Injectable 4500 Unit(s) IV Push every 6 hours PRN  heparin   Injectable 9000 Unit(s) IV Push once  heparin   Injectable 9000 Unit(s) IV Push every 6 hours PRN  heparin  Infusion.  Unit(s)/Hr IV Continuous <Continuous>  hydroxychloroquine 200 milliGRAM(s) Oral daily  insulin lispro (ADMELOG) corrective regimen sliding scale   SubCutaneous three times a day before meals  insulin lispro (ADMELOG) corrective regimen sliding scale   SubCutaneous at bedtime      FAMILY HISTORY:  FH: breast cancer (Mother)        Social History:  Smoking History:  Alcohol Use:  Drug Use:    Review of Systems:  REVIEW OF SYSTEMS:  CONSTITUTIONAL: No weakness, fevers or chills  EYES/ENT: No visual changes;  No dysphagia  NECK: No pain or stiffness  RESPIRATORY: No cough, wheezing, hemoptysis; No shortness of breath  CARDIOVASCULAR: No chest pain or palpitations; +lower extremity edema  GASTROINTESTINAL: No abdominal or epigastric pain. No nausea, vomiting, or hematemesis; No diarrhea or constipation. No melena or hematochezia.  BACK: No back pain  GENITOURINARY: No dysuria, frequency or hematuria  NEUROLOGICAL: No numbness or weakness  SKIN: No itching, burning, rashes, or lesions   All other review of systems is negative unless indicated above.    [ ] All other systems negative  [ ] Unable to assess ROS due to    Physical Exam:  T(F): 98.2 (02-23), Max: 98.2 (02-23)  HR: 77 (02-23) (72 - 88)  BP: 103/66 (02-23) (103/66 - 136/61)  RR: 18 (02-23)  SpO2: 98% (02-23)  GENERAL: No acute distress, well-developed  HEAD:  Atraumatic, Normocephalic  ENT: EOMI, PERRLA, conjunctiva and sclera clear, Neck supple, No JVD, moist mucosa  CHEST/LUNG: Clear to auscultation bilaterally; No wheeze, equal breath sounds bilaterally   BACK: No spinal tenderness  HEART: Regular rate and rhythm; No murmurs, rubs, or gallops  ABDOMEN: Soft, Nontender, Nondistended; Bowel sounds present  EXTREMITIES:  No clubbing, cyanosis; R>L swelling   PSYCH: Nl behavior, nl affect  NEUROLOGY: AAOx3, non-focal, cranial nerves intact  SKIN: Normal color, No rashes or lesions  LINES:    Cardiovascular Diagnostic Testing:    CXR: Personally reviewed    Labs: Personally reviewed                        12.3   8.91  )-----------( 213      ( 23 Feb 2024 06:01 )             38.5     02-22    143  |  104  |  27<H>  ----------------------------<  132<H>  3.9   |  28  |  1.34<H>    Ca    10.4      22 Feb 2024 21:49    TPro  7.7  /  Alb  4.2  /  TBili  0.5  /  DBili  x   /  AST  19  /  ALT  13  /  AlkPhos  99  02-22    PT/INR - ( 22 Feb 2024 21:49 )   PT: 12.6 sec;   INR: 1.21 ratio         PTT - ( 23 Feb 2024 06:01 )  PTT:>200.0 sec      CT Chest  IMPRESSION:  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 Findings are new since 2/3/2021.    No evidence of additional filling defect within the pulmonary arteries.    --- End of Report ---    US Duplex  IMPRESSION:    Deep venous thrombosis in the right lower extremity including the distal   right external iliac vein extending from the calf; thrombus in the right   common femoral vein appears mobile.    No evidence of deep venous thrombosis in the left lower extremity.    The findings were discussed with Dr. Lorenzo on 2/22/2024 6:19 PM    --- End of Report ---

## 2024-02-23 NOTE — H&P ADULT - NSICDXPASTMEDICALHX_GEN_ALL_CORE_FT
PAST MEDICAL HISTORY:  Chronic CHF     CKD (chronic kidney disease)     HLD (hyperlipidemia)     HTN (hypertension)     LUCIANO (obstructive sleep apnea)     Pulmonary hypertension     RA (rheumatoid arthritis)

## 2024-02-23 NOTE — CONSULT NOTE ADULT - SUBJECTIVE AND OBJECTIVE BOX
DATE OF SERVICE: 02-23-24 @ 11:50    CHIEF COMPLAINT:Patient is a 73y old  Female who presents with a chief complaint of RLE pain swelling (23 Feb 2024 09:24)      HISTORY OF PRESENT ILLNESS:HPI:  72 yo f with h/o pHTN, HTN, HLD, CKD3, RA, LUCIANO, ?CHF presented with RLE swelling and pain for 1 month found to have RLE extensive DVT on outpt study, Patient has had BILATERAL le swelling for about 1 month and has been treated with increased dose of lasix with Dr. Corley/Dr. Lorenzo. Swelling has improved though pt noted with RLE > LLE. some pain in calf and thigh as well though pt is unclear whether that was due to her underlying RA condition.  Patient denies any changes in exercise tolerance (some SUGGS at baseline due to obesity and deconditioning), no chest pain, palpitations, cough or hemoptysis, dizziness. Has had some mild dec in activity level due to overall weakness . no recent viral or illness. afebrile. no bowel or urinary sxs.     In the ED, VS: afebrile, 131/68, 72, 18, 97% RA. resting comfortably without acute complaints.   Started on hep gtt  (23 Feb 2024 09:24)      PAST MEDICAL & SURGICAL HISTORY:  HTN (hypertension)      CKD (chronic kidney disease)      Pulmonary hypertension      HLD (hyperlipidemia)      RA (rheumatoid arthritis)      LUCIANO (obstructive sleep apnea)      Chronic CHF      S/P hysterectomy      S/P hernia surgery              MEDICATIONS:  amLODIPine   Tablet 10 milliGRAM(s) Oral daily  heparin   Injectable 4500 Unit(s) IV Push every 6 hours PRN  heparin   Injectable 9000 Unit(s) IV Push once  heparin   Injectable 9000 Unit(s) IV Push every 6 hours PRN  heparin  Infusion.  Unit(s)/Hr IV Continuous <Continuous>    hydroxychloroquine 200 milliGRAM(s) Oral daily      acetaminophen     Tablet .. 650 milliGRAM(s) Oral every 6 hours PRN      allopurinol 100 milliGRAM(s) Oral daily  atorvastatin 10 milliGRAM(s) Oral at bedtime  dextrose 50% Injectable 12.5 Gram(s) IV Push once  dextrose 50% Injectable 25 Gram(s) IV Push once  dextrose 50% Injectable 25 Gram(s) IV Push once  dextrose Oral Gel 15 Gram(s) Oral once PRN  glucagon  Injectable 1 milliGRAM(s) IntraMuscular once  insulin lispro (ADMELOG) corrective regimen sliding scale   SubCutaneous three times a day before meals  insulin lispro (ADMELOG) corrective regimen sliding scale   SubCutaneous at bedtime    dextrose 5%. 1000 milliLiter(s) IV Continuous <Continuous>  dextrose 5%. 1000 milliLiter(s) IV Continuous <Continuous>      FAMILY HISTORY:  FH: breast cancer (Mother)        Non-contributory    SOCIAL HISTORY:  not a current smoker  Allergies    No Known Allergies    Intolerances    	    REVIEW OF SYSTEMS:  CONSTITUTIONAL: No fever  EYES: No eye pain, visual disturbances, or discharge  ENMT:  No difficulty hearing, tinnitus  NECK: No pain or stiffness  RESPIRATORY: No cough, wheezing, +SUGGS  CARDIOVASCULAR: No chest pain, palpitations, passing out, dizziness.  + right leg swelling  GASTROINTESTINAL:  No nausea, vomiting, diarrhea or constipation. No melena.  GENITOURINARY: No dysuria, hematuria  NEUROLOGICAL: No stroke like symptoms  SKIN: No burning or lesions   ENDOCRINE: No heat or cold intolerance  MUSCULOSKELETAL: No joint pain or swelling  PSYCHIATRIC: No  anxiety, mood swings  HEME/LYMPH: No bleeding gums  ALLERGY AND IMMUNOLOGIC: No hives or eczema	    All other ROS negative    PHYSICAL EXAM:  T(C): 36.8 (02-23-24 @ 10:29), Max: 36.8 (02-23-24 @ 10:29)  HR: 77 (02-23-24 @ 10:29) (72 - 88)  BP: 103/66 (02-23-24 @ 10:29) (103/66 - 136/61)  RR: 18 (02-23-24 @ 10:29) (18 - 20)  SpO2: 98% (02-23-24 @ 10:29) (97% - 100%)  Wt(kg): --  I&O's Summary      Appearance: Normal	  HEENT:   Normal oral mucosa, EOMI	  Cardiovascular:  S1 S2, No JVD,    Respiratory: Lungs clear to auscultation	  Psychiatry: Alert  Gastrointestinal:  Soft, Non-tender, + BS	  Skin: No rashes   Neurologic: Non-focal  Extremities:  No edema  Vascular: Peripheral pulses palpable    	    	  	  CARDIAC MARKERS:  Labs personally reviewed by me                                  12.3   8.91  )-----------( 213      ( 23 Feb 2024 06:01 )             38.5     02-22    143  |  104  |  27<H>  ----------------------------<  132<H>  3.9   |  28  |  1.34<H>    Ca    10.4      22 Feb 2024 21:49    TPro  7.7  /  Alb  4.2  /  TBili  0.5  /  DBili  x   /  AST  19  /  ALT  13  /  AlkPhos  99  02-22          EKG: Personally reviewed by me - NSR 75  Radiology: Personally reviewed by me -   < from: US Duplex Venous Lower Ext Complete, Bilateral (02.22.24 @ 18:17) >  IMPRESSION:    Deep venous thrombosis in the right lower extremity including the distal   right external iliac vein extending from the calf; thrombus in the right   common femoral vein appears mobile.    No evidenceof deep venous thrombosis in the left lower extremity.    < from: CT Angio Chest PE Protocol w/ IV Cont (02.22.24 @ 22:47) >  IMPRESSION:  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 Findings are new since 2/3/2021.    No evidence of additional filling defect within the pulmonary arteries.          Assessment /Plan:   72 yo f with h/o pHTN, HTN, HLD, CKD3, RA, LUCIANO, ?CHF presented with RLE swelling and pain for 1 month found to have RLE extensive DVT on outpt study, Patient has had BILATERAL le swelling for about 1 month and has been treated with increased dose of lasix with Dr. Corley/Dr. Lorenzo.     1. DVT/PE  - US RLE + DVT  - CTA lung "chronic" segmental RML PE with evidence of infarct   - Vascular consulted for possible thrombolysis   - c/w heparin gtt    2. HFpEF  - Pt follows w/ Dr. Corley as primary cardiologist.  Currently taking Aldactone 25mg qd and Lasix 40 mg qd  - Outpt increased dose of lasix from 40mg daily to BID for LE edema. Had improved in left leg only likely in setting of RLE DVT   - TTE 5/2023-  EF 60-66% The left ventricular wall thickness is mildly increased. mild to mod AR.  Estimated pulmonary artery systolic pressure is 43 mmHg, consistent with mild pulmonary   - appears euvolemic   - await new TTE  - check proBNP    3. HTN  - c/w home medications    4.HLD  - c/w atorvastatin 10mg qd    5. CKD  - creat 1.34 - recent increase in lasix dose to 40mg BID, on hold for now  - mon renal function                       Differential diagnosis and plan of care discussed with patient after the evaluation. Counseling on diet, nutritional counseling, weight management, exercise and medication compliance was done.   Advanced care planning/advanced directives discussed with patient/family. DNR status including forceful chest compressions to attempt to restart the heart, ventilator support/artificial breathing, electric shock, artificial nutrition, health care proxy, Molst form all discussed with pt. Pt wishes to consider. More than fifteen minutes spent on discussing advanced directives.     Iolani Behrbom, AG-NP  Giancarlo Landeros, DO [facility]  Cardiovascular Medicine  [address] [place] [address] Dr, [address]  Available for call or text via [employer] TE[employer]  Office [phone]   DATE OF SERVICE: 02-23-24 @ 11:50    CHIEF COMPLAINT:Patient is a 73y old  Female who presents with a chief complaint of RLE pain swelling (23 Feb 2024 09:24)      HISTORY OF PRESENT ILLNESS:HPI:  72 yo f with h/o pHTN, HTN, HLD, CKD3, RA, LUCIANO, ?CHF presented with RLE swelling and pain for 1 month found to have RLE extensive DVT on outpt study, Patient has had BILATERAL le swelling for about 1 month and has been treated with increased dose of lasix with Dr. Corley/Dr. Lorenzo. Swelling has improved though pt noted with RLE > LLE. some pain in calf and thigh as well though pt is unclear whether that was due to her underlying RA condition.  Patient denies any changes in exercise tolerance (some SUGGS at baseline due to obesity and deconditioning), no chest pain, palpitations, cough or hemoptysis, dizziness. Has had some mild dec in activity level due to overall weakness . no recent viral or illness. afebrile. no bowel or urinary sxs.     In the ED, VS: afebrile, 131/68, 72, 18, 97% RA. resting comfortably without acute complaints.   Started on hep gtt  (23 Feb 2024 09:24)      PAST MEDICAL & SURGICAL HISTORY:  HTN (hypertension)      CKD (chronic kidney disease)      Pulmonary hypertension      HLD (hyperlipidemia)      RA (rheumatoid arthritis)      LUCIANO (obstructive sleep apnea)      Chronic CHF      S/P hysterectomy      S/P hernia surgery              MEDICATIONS:  amLODIPine   Tablet 10 milliGRAM(s) Oral daily  heparin   Injectable 4500 Unit(s) IV Push every 6 hours PRN  heparin   Injectable 9000 Unit(s) IV Push once  heparin   Injectable 9000 Unit(s) IV Push every 6 hours PRN  heparin  Infusion.  Unit(s)/Hr IV Continuous <Continuous>    hydroxychloroquine 200 milliGRAM(s) Oral daily      acetaminophen     Tablet .. 650 milliGRAM(s) Oral every 6 hours PRN      allopurinol 100 milliGRAM(s) Oral daily  atorvastatin 10 milliGRAM(s) Oral at bedtime  dextrose 50% Injectable 12.5 Gram(s) IV Push once  dextrose 50% Injectable 25 Gram(s) IV Push once  dextrose 50% Injectable 25 Gram(s) IV Push once  dextrose Oral Gel 15 Gram(s) Oral once PRN  glucagon  Injectable 1 milliGRAM(s) IntraMuscular once  insulin lispro (ADMELOG) corrective regimen sliding scale   SubCutaneous three times a day before meals  insulin lispro (ADMELOG) corrective regimen sliding scale   SubCutaneous at bedtime    dextrose 5%. 1000 milliLiter(s) IV Continuous <Continuous>  dextrose 5%. 1000 milliLiter(s) IV Continuous <Continuous>      FAMILY HISTORY:  FH: breast cancer (Mother)        Non-contributory    SOCIAL HISTORY:  not a current smoker  Allergies    No Known Allergies    Intolerances    	    REVIEW OF SYSTEMS:  CONSTITUTIONAL: No fever  EYES: No eye pain, visual disturbances, or discharge  ENMT:  No difficulty hearing, tinnitus  NECK: No pain or stiffness  RESPIRATORY: No cough, wheezing, +SUGGS  CARDIOVASCULAR: No chest pain, palpitations, passing out, dizziness.  + right leg swelling  GASTROINTESTINAL:  No nausea, vomiting, diarrhea or constipation. No melena.  GENITOURINARY: No dysuria, hematuria  NEUROLOGICAL: No stroke like symptoms  SKIN: No burning or lesions   ENDOCRINE: No heat or cold intolerance  MUSCULOSKELETAL: No joint pain or swelling  PSYCHIATRIC: No  anxiety, mood swings  HEME/LYMPH: No bleeding gums  ALLERGY AND IMMUNOLOGIC: No hives or eczema	    All other ROS negative    PHYSICAL EXAM:  T(C): 36.8 (02-23-24 @ 10:29), Max: 36.8 (02-23-24 @ 10:29)  HR: 77 (02-23-24 @ 10:29) (72 - 88)  BP: 103/66 (02-23-24 @ 10:29) (103/66 - 136/61)  RR: 18 (02-23-24 @ 10:29) (18 - 20)  SpO2: 98% (02-23-24 @ 10:29) (97% - 100%)  Wt(kg): --  I&O's Summary      Appearance: Normal	  HEENT:   Normal oral mucosa, EOMI	  Cardiovascular:  S1 S2, No JVD,    Respiratory: Lungs clear to auscultation	  Psychiatry: Alert  Gastrointestinal:  Soft, Non-tender, + BS	  Skin: No rashes   Neurologic: Non-focal  Extremities:  No edema  Vascular: Peripheral pulses palpable    	    	  	  CARDIAC MARKERS:  Labs personally reviewed by me                                  12.3   8.91  )-----------( 213      ( 23 Feb 2024 06:01 )             38.5     02-22    143  |  104  |  27<H>  ----------------------------<  132<H>  3.9   |  28  |  1.34<H>    Ca    10.4      22 Feb 2024 21:49    TPro  7.7  /  Alb  4.2  /  TBili  0.5  /  DBili  x   /  AST  19  /  ALT  13  /  AlkPhos  99  02-22          EKG: Personally reviewed by me - NSR 75  Radiology: Personally reviewed by me -   < from: US Duplex Venous Lower Ext Complete, Bilateral (02.22.24 @ 18:17) >  IMPRESSION:    Deep venous thrombosis in the right lower extremity including the distal   right external iliac vein extending from the calf; thrombus in the right   common femoral vein appears mobile.    No evidenceof deep venous thrombosis in the left lower extremity.    < from: CT Angio Chest PE Protocol w/ IV Cont (02.22.24 @ 22:47) >  IMPRESSION:  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 Findings are new since 2/3/2021.    No evidence of additional filling defect within the pulmonary arteries.          Assessment /Plan:   72 yo f with h/o pHTN, HTN, HLD, CKD3, RA, LUCIANO, ?CHF presented with RLE swelling and pain for 1 month found to have RLE extensive DVT on outpt study, Patient has had BILATERAL le swelling for about 1 month and has been treated with increased dose of lasix with Dr. Corley/Dr. Lorenzo.     1. DVT/PE  - US RLE + DVT  - CTA lung "chronic" segmental RML PE with evidence of infarct   - Vascular surgery and vascular cardiology consulted for possible thrombolysis or thrombectomy, recs appreciated   - c/w heparin gtt    2. HFpEF  - Pt follows w/ Dr. Corley as primary cardiologist.  Currently taking Aldactone 25mg qd and Lasix 40 mg qd  - Outpt increased dose of lasix from 40mg daily to BID for LE edema. Had improved in left leg only likely in setting of RLE DVT   - TTE 5/2023-  EF 60-66% The left ventricular wall thickness is mildly increased. mild to mod AR.  Estimated pulmonary artery systolic pressure is 43 mmHg, consistent with mild pulmonary   - appears euvolemic   - await new TTE  - check proBNP    3. HTN  - c/w home medications    4.HLD  - c/w atorvastatin 10mg qd    5. CKD  - creat 1.34 - recent increase in lasix dose to 40mg BID, on hold for now  - mon renal function                       Differential diagnosis and plan of care discussed with patient after the evaluation. Counseling on diet, nutritional counseling, weight management, exercise and medication compliance was done.   Advanced care planning/advanced directives discussed with patient/family. DNR status including forceful chest compressions to attempt to restart the heart, ventilator support/artificial breathing, electric shock, artificial nutrition, health care proxy, Molst form all discussed with pt. Pt wishes to consider. More than fifteen minutes spent on discussing advanced directives.     Iolani Behrbom, JANNETH-NP  Giancarlo Landeros DO [facility]  Cardiovascular Medicine  [address] [place] [address] Dr, [address]  Available for call or text via [employer] TE[employer]  Office [phone]

## 2024-02-23 NOTE — H&P ADULT - NSHPPHYSICALEXAM_GEN_ALL_CORE
T(C): 36.4 (02-23-24 @ 06:35), Max: 36.7 (02-22-24 @ 20:21)  HR: 72 (02-23-24 @ 06:35) (72 - 88)  BP: 131/68 (02-23-24 @ 06:35) (130/72 - 136/61)  RR: 18 (02-23-24 @ 06:35) (18 - 20)  SpO2: 97% (02-23-24 @ 06:35) (97% - 100%)    CONSTITUTIONAL: Well groomed, no apparent distress  EYES: PERRLA and symmetric, EOMI, No conjunctival or scleral injection, non-icteric  ENMT: Oral mucosa with moist membranes. Normal dentition; no pharyngeal injection or exudates  NECK: Supple, symmetric and without tracheal deviation   RESP: No respiratory distress, no use of accessory muscles; CTA b/l, no WRR  CV: RRR, +S1S2, no MRG; no JVD;  RLE>LLE  GI: Soft, NT, ND, no rebound, no guarding; no palpable masses   MSK: Normal ROM without pain, no spinal tenderness, normal muscle strength/tone  SKIN: No rashes or ulcers noted; no subcutaneous nodules or induration palpable  PSYCH: Appropriate insight/judgment; A+O x 3, mood and affect appropriate, recent/remote memory intact

## 2024-02-23 NOTE — H&P ADULT - PROBLEM SELECTOR PLAN 2
Outpt increased dose of lasix from 40mg daily to BID for LE edema. has been improved though now more asymmetrical likely in setting of RLE DVT   Last echo from 5/2023 overall preserved LV function (both syst and diast) with mild mod valvular dz.   Will repeat echo to eval change and also RV strain/pulm HTN in setting of PE/DVT  Will hold lasix 40mg BID for now given CKD and contrast load received for CTA

## 2024-02-23 NOTE — H&P ADULT - ASSESSMENT
74 yo f with h/o pHTN, HTN, HLD, CKD3, RA, LUCIANO, ?CHF presented with outpt imaging +RLE DVT now with +PE

## 2024-02-23 NOTE — H&P ADULT - PROBLEM SELECTOR PLAN 3
Baseline Cr around 0.9 to 1.0 . admission Cr 1.34   Has been on higher dose lasix 40mg BID.   Will repeat lab now and monitor renal function.   Hold lasix for now until stable Cr s/p CTA but also plans for additional CT venogram to eval extension.   Monitor I+Os. mariamates

## 2024-02-23 NOTE — CONSULT NOTE ADULT - ASSESSMENT
ASSESSMENT: 72 yo female PMH HTN, CKD, CHF, Pulm Hypertension presents with right DVT involving the external iliac and chronic thromboembolic disease involving the pulmonary arteries.      PLAN:   - Recommend CT Venogram to locate proximal extension  - Continue AC  - Possible eligibility for [place] trial  - Will follow  - D/w Dr. Clark on behalf of Dr. Grande

## 2024-02-23 NOTE — H&P ADULT - HISTORY OF PRESENT ILLNESS
74 yo f with h/o pHTN, HTN, HLD, CKD3, RA, LUCIANO, ?CHF presented with RLE swelling and pain for 1 month found to have RLE extensive DVT on outpt study, Patient has had BILATERAL le swelling for about 1 month and has been treated with increased dose of lasix with Dr. Corley/Dr. Lorenzo. Swelling has improved though pt noted with RLE > LLE. some pain in calf and thigh as well though pt is unclear whether that was due to her underlying RA condition.  Patient denies any changes in exercise tolerance (some SUGGS at baseline due to obesity and deconditioning), no chest pain, palpitations, cough or hemoptysis, dizziness. Has had some mild dec in activity level due to overall weakness . no recent viral or illness. afebrile. no bowel or urinary sxs.     In the ED, VS: afebrile, 131/68, 72, 18, 97% RA. resting comfortably without acute complaints.   Started on hep gtt

## 2024-02-23 NOTE — H&P ADULT - PROBLEM SELECTOR PLAN 1
presented with RLE swelling and pain for about 1 month sent in after outpt LE duplex  with RLE DVT .  Has been following with Dr. Corley and Dr. Lorenzo for CHF with increased dose of lasix recently.   Patient also has h/o RA and h/o pulm HTN as overlying factors.   CTA lung showing "chronic" segmental RML PE with evidence of infarct   duplex R ext iliac vein clot extending to calf, thrombus in right common fem which is mobile.   Seen by vasc sx recommending CT venogram RLE to eval for extension into pelvis , possible enrollment into trial  Will follow up AM bloodwork re: renal function before ordering.  Started on hep gtt for now. eventual transition to oral   Check TTE with bubble study.

## 2024-02-23 NOTE — CONSULT NOTE ADULT - ATTENDING COMMENTS
The right leg is without phlegmasia  Proximal clot noted on Duplex    agree with heparin gtt for now  Await CT venogram abdomen and pelvis  Appreciate Dr. Grande - VSX    pulmonary embolism noted, small and vitals stable - heparin gtt for now
73 year old woman with acute right iliofemoral deep venous thrombosis  currently on therapeutic heparin infusion, can transition to oral anticoagulant or Lovenox on discharge  patient may be eligible for [place] randomized controlled trial  obtain CT venogram abdomen and pelvis to assess for extension of thrombus into common iliac vein and/or IVC  leg elevation at rest  likely will need close outpatient follow-up for [place] trial screening and enrollment  will follow closely

## 2024-02-23 NOTE — PATIENT PROFILE ADULT - FALL HARM RISK - HARM RISK INTERVENTIONS

## 2024-02-23 NOTE — H&P ADULT - PROBLEM SELECTOR PLAN 4
On home norvasc 10mg, lasix 40mg BID, spironolactone 25mg    cont with norvasc for now.   hold diuretic in setting of contrast study and slight inc in Cr

## 2024-02-23 NOTE — H&P ADULT - PROBLEM SELECTOR PLAN 7
Follow outpt with Dr. Scott  h/o pulm HTN.   CPAP usage at home .  echo to eval given current presentation with PE/DVT

## 2024-02-23 NOTE — CONSULT NOTE ADULT - ASSESSMENT
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 Vascular cardiology consulted for further management.       Recommendations:  - CT Venogram A/P pending to assess thrombus burden/extension   - Continue with elevation of RLE  - Vascular cardiology to follow  72 yo female PMH HTN, CKD, CHF, Pulm Hypertension presents with R DVT involving the external iliac and chronic thromboembolic disease involving the pulmonary arteries.     Vascular cardiology consulted for further management. Pt notes RLE > LLE swelling but denies significant pain or SOB/dyspnea. Has been pt of Dr. Corley's for many years, was told ~December she has pHTN by pulmonologist who she's seen since dx of pneumonia several years ago. No prior h/o DVT/PE, FHx of bleeding/clotting dz, remote surgeries (hysterectomy, umbilical hernia surgery), no recent immobilization or prolonged travel. No known h/o cancer and has been up todate and unremarkable with regards to cancer screening (colonoscopy, mammo, pap smears).         Recommendations:  - CT Venogram A/P pending to assess thrombus burden/extension   - C/w heparin gtt; anticipate transition to DOAC pending further workup   - Continue with elevation of RLE  - Vascular cardiology to follow

## 2024-02-24 LAB
A1C WITH ESTIMATED AVERAGE GLUCOSE RESULT: 5.7 % — HIGH (ref 4–5.6)
ANION GAP SERPL CALC-SCNC: 11 MMOL/L — SIGNIFICANT CHANGE UP (ref 5–17)
APTT BLD: 101.7 SEC — HIGH (ref 24.5–35.6)
APTT BLD: 135.1 SEC — CRITICAL HIGH (ref 24.5–35.6)
APTT BLD: 42.3 SEC — HIGH (ref 24.5–35.6)
APTT BLD: 57.8 SEC — HIGH (ref 24.5–35.6)
APTT BLD: >200 SEC — CRITICAL HIGH (ref 24.5–35.6)
BUN SERPL-MCNC: 33 MG/DL — HIGH (ref 7–23)
CALCIUM SERPL-MCNC: 9.4 MG/DL — SIGNIFICANT CHANGE UP (ref 8.4–10.5)
CHLORIDE SERPL-SCNC: 105 MMOL/L — SIGNIFICANT CHANGE UP (ref 96–108)
CO2 SERPL-SCNC: 25 MMOL/L — SIGNIFICANT CHANGE UP (ref 22–31)
CREAT SERPL-MCNC: 1.43 MG/DL — HIGH (ref 0.5–1.3)
EGFR: 38 ML/MIN/1.73M2 — LOW
ESTIMATED AVERAGE GLUCOSE: 117 MG/DL — HIGH (ref 68–114)
GLUCOSE BLDC GLUCOMTR-MCNC: 107 MG/DL — HIGH (ref 70–99)
GLUCOSE BLDC GLUCOMTR-MCNC: 112 MG/DL — HIGH (ref 70–99)
GLUCOSE BLDC GLUCOMTR-MCNC: 117 MG/DL — HIGH (ref 70–99)
GLUCOSE BLDC GLUCOMTR-MCNC: 95 MG/DL — SIGNIFICANT CHANGE UP (ref 70–99)
GLUCOSE SERPL-MCNC: 126 MG/DL — HIGH (ref 70–99)
HCT VFR BLD CALC: 37.8 % — SIGNIFICANT CHANGE UP (ref 34.5–45)
HCV AB S/CO SERPL IA: 0.11 S/CO — SIGNIFICANT CHANGE UP (ref 0–0.99)
HCV AB SERPL-IMP: SIGNIFICANT CHANGE UP
HGB BLD-MCNC: 11.9 G/DL — SIGNIFICANT CHANGE UP (ref 11.5–15.5)
INR BLD: 1.21 RATIO — HIGH (ref 0.85–1.18)
MCHC RBC-ENTMCNC: 29.5 PG — SIGNIFICANT CHANGE UP (ref 27–34)
MCHC RBC-ENTMCNC: 31.5 GM/DL — LOW (ref 32–36)
MCV RBC AUTO: 93.6 FL — SIGNIFICANT CHANGE UP (ref 80–100)
MRSA PCR RESULT.: SIGNIFICANT CHANGE UP
NRBC # BLD: 0 /100 WBCS — SIGNIFICANT CHANGE UP (ref 0–0)
PLATELET # BLD AUTO: 191 K/UL — SIGNIFICANT CHANGE UP (ref 150–400)
POTASSIUM SERPL-MCNC: 4 MMOL/L — SIGNIFICANT CHANGE UP (ref 3.5–5.3)
POTASSIUM SERPL-SCNC: 4 MMOL/L — SIGNIFICANT CHANGE UP (ref 3.5–5.3)
PROTHROM AB SERPL-ACNC: 13.2 SEC — HIGH (ref 9.5–13)
RBC # BLD: 4.04 M/UL — SIGNIFICANT CHANGE UP (ref 3.8–5.2)
RBC # FLD: 13.5 % — SIGNIFICANT CHANGE UP (ref 10.3–14.5)
S AUREUS DNA NOSE QL NAA+PROBE: SIGNIFICANT CHANGE UP
SODIUM SERPL-SCNC: 141 MMOL/L — SIGNIFICANT CHANGE UP (ref 135–145)
WBC # BLD: 7.68 K/UL — SIGNIFICANT CHANGE UP (ref 3.8–10.5)
WBC # FLD AUTO: 7.68 K/UL — SIGNIFICANT CHANGE UP (ref 3.8–10.5)

## 2024-02-24 PROCEDURE — 72198 MR ANGIO PELVIS W/O & W/DYE: CPT | Mod: 26

## 2024-02-24 PROCEDURE — 99232 SBSQ HOSP IP/OBS MODERATE 35: CPT

## 2024-02-24 PROCEDURE — 74185 MRA ABD W OR W/O CNTRST: CPT | Mod: 26

## 2024-02-24 RX ADMIN — Medication 200 MILLIGRAM(S): at 11:12

## 2024-02-24 RX ADMIN — HEPARIN SODIUM 600 UNIT(S)/HR: 5000 INJECTION INTRAVENOUS; SUBCUTANEOUS at 09:45

## 2024-02-24 RX ADMIN — HEPARIN SODIUM 0 UNIT(S)/HR: 5000 INJECTION INTRAVENOUS; SUBCUTANEOUS at 01:56

## 2024-02-24 RX ADMIN — Medication 650 MILLIGRAM(S): at 22:54

## 2024-02-24 RX ADMIN — Medication 40 MILLIGRAM(S): at 05:40

## 2024-02-24 RX ADMIN — ATORVASTATIN CALCIUM 10 MILLIGRAM(S): 80 TABLET, FILM COATED ORAL at 21:39

## 2024-02-24 RX ADMIN — Medication 100 MILLIGRAM(S): at 11:11

## 2024-02-24 RX ADMIN — AMLODIPINE BESYLATE 10 MILLIGRAM(S): 2.5 TABLET ORAL at 05:40

## 2024-02-24 RX ADMIN — Medication 650 MILLIGRAM(S): at 23:55

## 2024-02-24 RX ADMIN — Medication 40 MILLIGRAM(S): at 13:50

## 2024-02-24 RX ADMIN — HEPARIN SODIUM 800 UNIT(S)/HR: 5000 INJECTION INTRAVENOUS; SUBCUTANEOUS at 02:58

## 2024-02-24 RX ADMIN — HEPARIN SODIUM 600 UNIT(S)/HR: 5000 INJECTION INTRAVENOUS; SUBCUTANEOUS at 16:00

## 2024-02-24 RX ADMIN — HEPARIN SODIUM 800 UNIT(S)/HR: 5000 INJECTION INTRAVENOUS; SUBCUTANEOUS at 22:14

## 2024-02-24 RX ADMIN — CHLORHEXIDINE GLUCONATE 1 APPLICATION(S): 213 SOLUTION TOPICAL at 11:12

## 2024-02-24 NOTE — PHYSICAL THERAPY INITIAL EVALUATION ADULT - PLANNED THERAPY INTERVENTIONS, PT EVAL
GOAL: Patient will be able to negotiate 4 steps in 2 weeks/bed mobility training/strengthening/transfer training

## 2024-02-24 NOTE — PHYSICAL THERAPY INITIAL EVALUATION ADULT - GAIT DEVIATIONS NOTED, PT EVAL
decreased mahi/decreased velocity of limb motion/decreased step length/decreased swing-to-stance ratio/decreased weight-shifting ability

## 2024-02-24 NOTE — PROGRESS NOTE ADULT - SUBJECTIVE AND OBJECTIVE BOX
DATE OF SERVICE: 02-24-24 @ 14:10    Patient is a 73y old  Female who presents with a chief complaint of RLE pain swelling (24 Feb 2024 13:09)      INTERVAL HISTORY: no complaints     REVIEW OF SYSTEMS:  CONSTITUTIONAL: No weakness  EYES/ENT: No visual changes;  No throat pain   NECK: No pain or stiffness  RESPIRATORY: No cough, wheezing; No shortness of breath  CARDIOVASCULAR: No chest pain or palpitations  GASTROINTESTINAL: No abdominal  pain. No nausea, vomiting, or hematemesis  GENITOURINARY: No dysuria, frequency or hematuria  NEUROLOGICAL: No stroke like symptoms  SKIN: No rashes    	  MEDICATIONS:  amLODIPine   Tablet 10 milliGRAM(s) Oral daily  furosemide    Tablet 40 milliGRAM(s) Oral two times a day        PHYSICAL EXAM:  T(C): 36.5 (02-24-24 @ 11:16), Max: 37.2 (02-23-24 @ 20:54)  HR: 70 (02-24-24 @ 11:16) (70 - 90)  BP: 142/70 (02-24-24 @ 11:16) (114/73 - 147/82)  RR: 18 (02-24-24 @ 11:16) (17 - 18)  SpO2: 98% (02-24-24 @ 11:16) (97% - 100%)  Wt(kg): --  I&O's Summary    24 Feb 2024 07:01  -  24 Feb 2024 14:10  --------------------------------------------------------  IN: 20 mL / OUT: 0 mL / NET: 20 mL          Appearance: In no distress	  HEENT:    PERRL, EOMI	  Cardiovascular:  S1 S2, No JVD  Respiratory: Lungs clear to auscultation	  Gastrointestinal:  Soft, Non-tender, + BS	  Vascularature:  No edema of LE  Psychiatric: Appropriate affect   Neuro: no acute focal deficits                               11.9   7.68  )-----------( 191      ( 24 Feb 2024 05:22 )             37.8     02-24    141  |  105  |  33<H>  ----------------------------<  126<H>  4.0   |  25  |  1.43<H>    Ca    9.4      24 Feb 2024 05:23    TPro  7.7  /  Alb  4.2  /  TBili  0.5  /  DBili  x   /  AST  19  /  ALT  13  /  AlkPhos  99  02-22        Labs personally reviewed      ASSESSMENT/PLAN: 	    74 yo f with h/o pHTN, HTN, HLD, CKD3, RA, LUCIANO, ?CHF presented with RLE swelling and pain for 1 month found to have RLE extensive DVT on outpt study, Patient has had BILATERAL le swelling for about 1 month and has been treated with increased dose of lasix with Dr. Corley/Dr. Lorenzo.     1. DVT/PE  - US RLE + DVT  - CTA lung "chronic" segmental RML PE with evidence of infarct   - Vascular surgery and vascular cardiology consulted for possible thrombolysis or thrombectomy, recs appreciated   - c/w heparin gtt  - MR venogram done f/u results  - Possible vasc intervention Monday w/ Dr Chahal    2. HFpEF  - Pt follows w/ Dr. Corley as primary cardiologist.  Currently taking Aldactone 25mg qd and Lasix 40 mg qd  - Outpt increased dose of lasix from 40mg daily to BID for LE edema. Had improved in left leg only likely in setting of RLE DVT   - TTE 5/2023-  EF 60-66% The left ventricular wall thickness is mildly increased. mild to mod AR.  Estimated pulmonary artery systolic pressure is 43 mmHg, consistent with mild pulmonary   - appears euvolemic   - await new TTE  - TTE 2/23 hyperdynamic EF, no intracardiac shunt   - check proBNP    3. HTN  - c/w home medications    4.HLD  - c/w atorvastatin 10mg qd    5. CKD  - creat 1.34 - recent increase in lasix dose to 40mg BID, on hold for now  - mon renal function                 LUIS CARLOS Yoo DO [facility]  Cardiovascular Medicine  [address] [place] [address], [address]  Office: [phone]  Available via call/text on [employer] Teams  DATE OF SERVICE: 02-24-24 @ 14:10    Patient is a 73y old  Female who presents with a chief complaint of RLE pain swelling (24 Feb 2024 13:09)      INTERVAL HISTORY: no complaints     REVIEW OF SYSTEMS:  CONSTITUTIONAL: No weakness  EYES/ENT: No visual changes;  No throat pain   NECK: No pain or stiffness  RESPIRATORY: No cough, wheezing; No shortness of breath  CARDIOVASCULAR: No chest pain or palpitations  GASTROINTESTINAL: No abdominal  pain. No nausea, vomiting, or hematemesis  GENITOURINARY: No dysuria, frequency or hematuria  NEUROLOGICAL: No stroke like symptoms  SKIN: No rashes    	  MEDICATIONS:  amLODIPine   Tablet 10 milliGRAM(s) Oral daily  furosemide    Tablet 40 milliGRAM(s) Oral two times a day        PHYSICAL EXAM:  T(C): 36.5 (02-24-24 @ 11:16), Max: 37.2 (02-23-24 @ 20:54)  HR: 70 (02-24-24 @ 11:16) (70 - 90)  BP: 142/70 (02-24-24 @ 11:16) (114/73 - 147/82)  RR: 18 (02-24-24 @ 11:16) (17 - 18)  SpO2: 98% (02-24-24 @ 11:16) (97% - 100%)  Wt(kg): --  I&O's Summary    24 Feb 2024 07:01  -  24 Feb 2024 14:10  --------------------------------------------------------  IN: 20 mL / OUT: 0 mL / NET: 20 mL          Appearance: In no distress	  HEENT:    PERRL, EOMI	  Cardiovascular:  S1 S2, No JVD  Respiratory: Lungs clear to auscultation	  Gastrointestinal:  Soft, Non-tender, + BS	  Vascularature:  No edema of LE  Psychiatric: Appropriate affect   Neuro: no acute focal deficits                               11.9   7.68  )-----------( 191      ( 24 Feb 2024 05:22 )             37.8     02-24    141  |  105  |  33<H>  ----------------------------<  126<H>  4.0   |  25  |  1.43<H>    Ca    9.4      24 Feb 2024 05:23    TPro  7.7  /  Alb  4.2  /  TBili  0.5  /  DBili  x   /  AST  19  /  ALT  13  /  AlkPhos  99  02-22        Labs personally reviewed      ASSESSMENT/PLAN: 	    74 yo f with h/o pHTN, HTN, HLD, CKD3, RA, LUCIANO, ?CHF presented with RLE swelling and pain for 1 month found to have RLE extensive DVT on outpt study, Patient has had BILATERAL le swelling for about 1 month and has been treated with increased dose of lasix with Dr. Corley/Dr. Lorenzo.     1. DVT/PE  - US RLE + DVT  - CTA lung "chronic" segmental RML PE with evidence of infarct   - Vascular surgery and vascular cardiology consulted for possible thrombolysis or thrombectomy, recs appreciated   - c/w heparin gtt  - MR venogram done f/u results  - Vascular cards and vascular surg FU    2. HFpEF  - Pt follows w/ Dr. Corley as primary cardiologist.  Currently taking Aldactone 25mg qd and Lasix 40 mg qd  - Outpt increased dose of lasix from 40mg daily to BID for LE edema. Had improved in left leg only likely in setting of RLE DVT   - TTE 5/2023-  EF 60-66% The left ventricular wall thickness is mildly increased. mild to mod AR.  Estimated pulmonary artery systolic pressure is 43 mmHg, consistent with mild pulmonary   - appears euvolemic   - await new TTE  - TTE 2/23 hyperdynamic EF, no intracardiac shunt     3. HTN  - c/w home medications    4.HLD  - c/w atorvastatin 10mg qd    5. CKD  - creat 1.34 - recent increase in lasix dose to 40mg BID, on hold for now  - mon renal function                 LUIS CARLOS Yoo DO [facility]  Cardiovascular Medicine  [address] [place] [address], [address]  Office: [phone]  Available via call/text on Microsoft Teams

## 2024-02-24 NOTE — PROVIDER CONTACT NOTE (CRITICAL VALUE NOTIFICATION) - ASSESSMENT
Patient is AOx4, VSS. No s/s of bleeding noted on assessment. No complaint of chest pain sob or palpitations.

## 2024-02-24 NOTE — PROGRESS NOTE ADULT - ASSESSMENT
73F pHTN, HTN, HLD, CKD3, RA, LUCIANO, ?CHF presented with outpt imaging +RLE DVT now with +PE, pending further workup with vascular cardiology

## 2024-02-24 NOTE — PROGRESS NOTE ADULT - ASSESSMENT
72 yo female PMH HTN, CKD, CHF, Pulm Hypertension presents with acute right iliofemoral DVT, as well as PE.    PLAN:   - MR venogram done, will f/u read  - Continue heparin gtt - can transition to oral AC or TLVX on discharge  - Vascular cards following- defer to recommendations regarding PE management      Vascular Surgery  [id number]

## 2024-02-24 NOTE — PHYSICAL THERAPY INITIAL EVALUATION ADULT - PERTINENT HX OF CURRENT PROBLEM, REHAB EVAL
Pt is 73F admitted 2/24/24 PMHx pHTN, HTN, HLD, CKD3, RA, LUCIANO, ?CHF presented with RLE swelling and pain for 1 month found to have RLE extensive DVT on outpt study, Patient has had BILATERAL le swelling for about 1 month and has been treated with increased dose of lasix with Dr. Corley/Dr. Lorenzo. Swelling has improved though pt noted with RLE > LLE. some pain in calf and thigh as well though pt is unclear whether that was due to her underlying RA condition.  Patient denies any changes in exercise tolerance (some SUGGS at baseline due to obesity and deconditioning), no chest pain, palpitations, cough or hemoptysis, dizziness. Has had some mild dec in activity level due to overall weakness.     In the ED, VS: afebrile, 131/68, 72, 18, 97% RA. resting comfortably without acute complaints.   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 No evidence of additional filling defect within the pulmonary arteries. VA DUPPLEX: Deep venous thrombosis in the right lower extremity including the distal right external iliac vein extending from the calf; thrombus in the right common femoral vein appears mobile. No evidence of deep venous thrombosis in the left lower extremity. Pt is 73F admitted 2/24/24 PMHx pHTN, HTN, HLD, CKD3, RA, LUCIANO, ?CHF presented with RLE swelling and pain for 1 month found to have RLE extensive DVT on outpt study, Patient has had BILATERAL le swelling for about 1 month and has been treated with increased dose of lasix with Dr. Corley/Dr. Lorenzo. Swelling has improved though pt noted with RLE > LLE. some pain in calf and thigh as well though pt is unclear whether that was due to her underlying RA condition.  Patient denies any changes in exercise tolerance (some SUGGS at baseline due to obesity and deconditioning), no chest pain, palpitations, cough or hemoptysis, dizziness. Has had some mild dec in activity level due to overall weakness.   In the ED, VS: afebrile, 131/68, 72, 18, 97% RA. resting comfortably without acute complaints.   Started on hep gtt    Hospital course: (2/24) MR Abd: Acute deep venous thrombosis involving the distal right external iliac vein and right common femoral vein.      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 No evidence of additional filling defect within the pulmonary arteries. VA DUPPLEX: Deep venous thrombosis in the right lower extremity including the distal right external iliac vein extending from the calf; thrombus in the right common femoral vein appears mobile. No evidence of deep venous thrombosis in the left lower extremity.

## 2024-02-24 NOTE — PROGRESS NOTE ADULT - SUBJECTIVE AND OBJECTIVE BOX
[facility] Division of Hospital Medicine  Willi Arguello MD  Available via MS Teams    SUBJECTIVE / OVERNIGHT EVENTS: Patient seen and examined at bedside, resting comfortably and in no acute distress. Denies chest pain or palpitations. Endorses shortness of breath, denies cough. Denies abdominal pain, nausea or vomiting. ROS otherwise noncontributory.     MEDICATIONS  (STANDING):  allopurinol 100 milliGRAM(s) Oral daily  amLODIPine   Tablet 10 milliGRAM(s) Oral daily  atorvastatin 10 milliGRAM(s) Oral at bedtime  chlorhexidine 2% Cloths 1 Application(s) Topical daily  dextrose 5%. 1000 milliLiter(s) (100 mL/Hr) IV Continuous <Continuous>  dextrose 5%. 1000 milliLiter(s) (50 mL/Hr) IV Continuous <Continuous>  dextrose 50% Injectable 12.5 Gram(s) IV Push once  dextrose 50% Injectable 25 Gram(s) IV Push once  dextrose 50% Injectable 25 Gram(s) IV Push once  furosemide    Tablet 40 milliGRAM(s) Oral two times a day  glucagon  Injectable 1 milliGRAM(s) IntraMuscular once  heparin   Injectable 9000 Unit(s) IV Push once  heparin  Infusion.  Unit(s)/Hr (20 mL/Hr) IV Continuous <Continuous>  hydroxychloroquine 200 milliGRAM(s) Oral daily  insulin lispro (ADMELOG) corrective regimen sliding scale   SubCutaneous three times a day before meals  insulin lispro (ADMELOG) corrective regimen sliding scale   SubCutaneous at bedtime    MEDICATIONS  (PRN):  acetaminophen     Tablet .. 650 milliGRAM(s) Oral every 6 hours PRN Temp greater or equal to 38C (100.4F), Mild Pain (1 - 3)  dextrose Oral Gel 15 Gram(s) Oral once PRN Blood Glucose LESS THAN 70 milliGRAM(s)/deciliter  heparin   Injectable 9000 Unit(s) IV Push every 6 hours PRN For aPTT less than 40  heparin   Injectable 4500 Unit(s) IV Push every 6 hours PRN For aPTT between 40 - 57    I&O's Summary    PHYSICAL EXAM:  Vital Signs Last 24 Hrs  T(C): 36.5 (24 Feb 2024 11:16), Max: 37.2 (23 Feb 2024 20:54)  T(F): 97.7 (24 Feb 2024 11:16), Max: 99 (23 Feb 2024 20:54)  HR: 70 (24 Feb 2024 11:16) (70 - 90)  BP: 142/70 (24 Feb 2024 11:16) (114/73 - 147/82)  RR: 18 (24 Feb 2024 11:16) (17 - 18)  SpO2: 98% (24 Feb 2024 11:16) (97% - 100%)    Parameters below as of 24 Feb 2024 11:16  Patient On (Oxygen Delivery Method): room air    CONSTITUTIONAL: NAD, well-groomed  EYES: PERRLA; conjunctiva and sclera clear  ENMT: Moist oral mucosa, no pharyngeal injection or exudates; normal dentition  NECK: Supple, no palpable masses; no thyromegaly  RESPIRATORY: Normal respiratory effort; lungs are clear to auscultation bilaterally  CARDIOVASCULAR: normal S1 and S2, no murmur/rub/gallop; No lower extremity edema  ABDOMEN: Nontender to palpation, normoactive bowel sounds, no rebound/guarding; No hepatosplenomegaly  MUSCULOSKELETAL:  no clubbing or cyanosis of digits; no joint swelling or tenderness to palpation  PSYCH: A+O to person, place, and time; affect appropriate  NEUROLOGY: CN 2-12 are intact and symmetric; no gross sensory deficits   SKIN: No rashes; no palpable lesions    LABS:             11.9   7.68  )-----------( 191      ( 24 Feb 2024 05:22 )             37.8     141  |  105  |  33<H>  ----------------------------<  126<H>  4.0   |  25  |  1.43<H>    Ca    9.4      24 Feb 2024 05:23    TPro  7.7  /  Alb  4.2  /  TBili  0.5  /  DBili  x   /  AST  19  /  ALT  13  /  AlkPhos  99  02-22    PT/INR - ( 24 Feb 2024 05:22 )   PT: 13.2 sec;   INR: 1.21 ratio         PTT - ( 24 Feb 2024 09:21 )  PTT:101.7 sec      Urinalysis Basic - ( 24 Feb 2024 05:23 )    Color: x / Appearance: x / SG: x / pH: x  Gluc: 126 mg/dL / Ketone: x  / Bili: x / Urobili: x   Blood: x / Protein: x / Nitrite: x   Leuk Esterase: x / RBC: x / WBC x   Sq Epi: x / Non Sq Epi: x / Bacteria: x

## 2024-02-24 NOTE — PHYSICAL THERAPY INITIAL EVALUATION ADULT - ADDITIONAL COMMENTS
Patient lives in private house with 4STE w/B/L handrail; first floor set up. As per patient, her daughter lives on the 2nd floor w/her family. PTA, pt. was independent in ADLs & mobility w/SC, also has RW, which she does not use. Patient reports she is never alone at home and have assistance from family mebers if required.

## 2024-02-24 NOTE — PROGRESS NOTE ADULT - SUBJECTIVE AND OBJECTIVE BOX
Vascular Surgery Progress Note     Subjective:  Patient seen and examined on AM rounds. Reports ongoing RLE swelling, pain has improved since admission.      Vital Signs:  Vital Signs Last 24 Hrs  T(C): 36.5 (24 Feb 2024 11:16), Max: 37.2 (23 Feb 2024 20:54)  T(F): 97.7 (24 Feb 2024 11:16), Max: 99 (23 Feb 2024 20:54)  HR: 70 (24 Feb 2024 11:16) (70 - 90)  BP: 142/70 (24 Feb 2024 11:16) (114/73 - 147/82)  BP(mean): --  RR: 18 (24 Feb 2024 11:16) (17 - 18)  SpO2: 98% (24 Feb 2024 11:16) (97% - 100%)    Parameters below as of 24 Feb 2024 11:16  Patient On (Oxygen Delivery Method): room air        CAPILLARY BLOOD GLUCOSE      POCT Blood Glucose.: 95 mg/dL (24 Feb 2024 11:05)  POCT Blood Glucose.: 117 mg/dL (24 Feb 2024 07:22)  POCT Blood Glucose.: 122 mg/dL (23 Feb 2024 21:33)  POCT Blood Glucose.: 128 mg/dL (23 Feb 2024 18:13)      I&O's Detail    24 Feb 2024 07:01  -  24 Feb 2024 13:10  --------------------------------------------------------  IN:    Oral Fluid: 20 mL  Total IN: 20 mL    OUT:  Total OUT: 0 mL    Total NET: 20 mL            Physical Exam:  General: NAD, resting comfortably in bed  HEENT: Normocephalic atraumatic  Respiratory: Nonlabored respirations  Cardio: regular rate  Vascular: RLE > LLE swelling, no phlegmasia or skin changes, R calf tenderness, motor/sensation intact b/l          Labs:    02-24    141  |  105  |  33<H>  ----------------------------<  126<H>  4.0   |  25  |  1.43<H>    Ca    9.4      24 Feb 2024 05:23    TPro  7.7  /  Alb  4.2  /  TBili  0.5  /  DBili  x   /  AST  19  /  ALT  13  /  AlkPhos  99  02-22    LIVER FUNCTIONS - ( 22 Feb 2024 21:49 )  Alb: 4.2 g/dL / Pro: 7.7 g/dL / ALK PHOS: 99 U/L / ALT: 13 U/L / AST: 19 U/L / GGT: x                                 11.9   7.68  )-----------( 191      ( 24 Feb 2024 05:22 )             37.8     PT/INR - ( 24 Feb 2024 05:22 )   PT: 13.2 sec;   INR: 1.21 ratio         PTT - ( 24 Feb 2024 09:21 )  PTT:101.7 sec

## 2024-02-25 LAB
ANION GAP SERPL CALC-SCNC: 12 MMOL/L — SIGNIFICANT CHANGE UP (ref 5–17)
APTT BLD: 52.5 SEC — HIGH (ref 24.5–35.6)
APTT BLD: 69.8 SEC — HIGH (ref 24.5–35.6)
APTT BLD: 92.5 SEC — HIGH (ref 24.5–35.6)
BUN SERPL-MCNC: 35 MG/DL — HIGH (ref 7–23)
CALCIUM SERPL-MCNC: 9.5 MG/DL — SIGNIFICANT CHANGE UP (ref 8.4–10.5)
CHLORIDE SERPL-SCNC: 105 MMOL/L — SIGNIFICANT CHANGE UP (ref 96–108)
CO2 SERPL-SCNC: 25 MMOL/L — SIGNIFICANT CHANGE UP (ref 22–31)
CREAT SERPL-MCNC: 1.4 MG/DL — HIGH (ref 0.5–1.3)
EGFR: 39 ML/MIN/1.73M2 — LOW
GLUCOSE BLDC GLUCOMTR-MCNC: 102 MG/DL — HIGH (ref 70–99)
GLUCOSE BLDC GLUCOMTR-MCNC: 108 MG/DL — HIGH (ref 70–99)
GLUCOSE BLDC GLUCOMTR-MCNC: 120 MG/DL — HIGH (ref 70–99)
GLUCOSE BLDC GLUCOMTR-MCNC: 145 MG/DL — HIGH (ref 70–99)
GLUCOSE SERPL-MCNC: 111 MG/DL — HIGH (ref 70–99)
HCT VFR BLD CALC: 37.3 % — SIGNIFICANT CHANGE UP (ref 34.5–45)
HGB BLD-MCNC: 12 G/DL — SIGNIFICANT CHANGE UP (ref 11.5–15.5)
MAGNESIUM SERPL-MCNC: 2 MG/DL — SIGNIFICANT CHANGE UP (ref 1.6–2.6)
MCHC RBC-ENTMCNC: 30.2 PG — SIGNIFICANT CHANGE UP (ref 27–34)
MCHC RBC-ENTMCNC: 32.2 GM/DL — SIGNIFICANT CHANGE UP (ref 32–36)
MCV RBC AUTO: 93.7 FL — SIGNIFICANT CHANGE UP (ref 80–100)
NRBC # BLD: 0 /100 WBCS — SIGNIFICANT CHANGE UP (ref 0–0)
PLATELET # BLD AUTO: 209 K/UL — SIGNIFICANT CHANGE UP (ref 150–400)
POTASSIUM SERPL-MCNC: 3.8 MMOL/L — SIGNIFICANT CHANGE UP (ref 3.5–5.3)
POTASSIUM SERPL-SCNC: 3.8 MMOL/L — SIGNIFICANT CHANGE UP (ref 3.5–5.3)
RBC # BLD: 3.98 M/UL — SIGNIFICANT CHANGE UP (ref 3.8–5.2)
RBC # FLD: 13.2 % — SIGNIFICANT CHANGE UP (ref 10.3–14.5)
SODIUM SERPL-SCNC: 142 MMOL/L — SIGNIFICANT CHANGE UP (ref 135–145)
WBC # BLD: 8.12 K/UL — SIGNIFICANT CHANGE UP (ref 3.8–10.5)
WBC # FLD AUTO: 8.12 K/UL — SIGNIFICANT CHANGE UP (ref 3.8–10.5)

## 2024-02-25 PROCEDURE — 99232 SBSQ HOSP IP/OBS MODERATE 35: CPT

## 2024-02-25 RX ADMIN — ATORVASTATIN CALCIUM 10 MILLIGRAM(S): 80 TABLET, FILM COATED ORAL at 21:25

## 2024-02-25 RX ADMIN — HEPARIN SODIUM 1000 UNIT(S)/HR: 5000 INJECTION INTRAVENOUS; SUBCUTANEOUS at 07:18

## 2024-02-25 RX ADMIN — HEPARIN SODIUM 1000 UNIT(S)/HR: 5000 INJECTION INTRAVENOUS; SUBCUTANEOUS at 21:26

## 2024-02-25 RX ADMIN — HEPARIN SODIUM 1000 UNIT(S)/HR: 5000 INJECTION INTRAVENOUS; SUBCUTANEOUS at 19:18

## 2024-02-25 RX ADMIN — HEPARIN SODIUM 1000 UNIT(S)/HR: 5000 INJECTION INTRAVENOUS; SUBCUTANEOUS at 12:44

## 2024-02-25 RX ADMIN — Medication 40 MILLIGRAM(S): at 14:26

## 2024-02-25 RX ADMIN — Medication 40 MILLIGRAM(S): at 06:25

## 2024-02-25 RX ADMIN — Medication 100 MILLIGRAM(S): at 11:22

## 2024-02-25 RX ADMIN — HEPARIN SODIUM 1000 UNIT(S)/HR: 5000 INJECTION INTRAVENOUS; SUBCUTANEOUS at 13:34

## 2024-02-25 RX ADMIN — AMLODIPINE BESYLATE 10 MILLIGRAM(S): 2.5 TABLET ORAL at 06:25

## 2024-02-25 RX ADMIN — CHLORHEXIDINE GLUCONATE 1 APPLICATION(S): 213 SOLUTION TOPICAL at 11:23

## 2024-02-25 RX ADMIN — Medication 200 MILLIGRAM(S): at 11:22

## 2024-02-25 RX ADMIN — Medication 650 MILLIGRAM(S): at 23:46

## 2024-02-25 RX ADMIN — HEPARIN SODIUM 1000 UNIT(S)/HR: 5000 INJECTION INTRAVENOUS; SUBCUTANEOUS at 06:25

## 2024-02-25 NOTE — PROVIDER CONTACT NOTE (OTHER) - SITUATION
Patient c/o of feeling dizziness after walking with PT. Patient was resting in recliner during complaint.
Patient APTT came back subtherapeutic 42.3. Target APTT is 58-99.
Lab called with critical value. Patient hep gtt running at 12 ml/hr and aptt came back >200.

## 2024-02-25 NOTE — PROGRESS NOTE ADULT - ASSESSMENT
72 yo female PMH HTN, CKD, CHF, Pulm Hypertension presents with acute right iliofemoral DVT, as well as PE.    PLAN:   - MR venogram done - DVT of L EIA and common femoral, no proximal extension into IVC  - Continue heparin gtt - can transition to oral AC or TLVX on discharge  - Vascular cards following- will coordinate management      Vascular Surgery  [id number]

## 2024-02-25 NOTE — PROGRESS NOTE ADULT - TIME BILLING
Physical exam   Review of consultant recommendations   Review of MR reports  Discussion of case with patient, patient's daughter over phone

## 2024-02-25 NOTE — PHYSICAL EXAM
[No Acute Distress] : no acute distress [Well Nourished] : well nourished [Well-Appearing] : well-appearing [Well Developed] : well developed [Normal Sclera/Conjunctiva] : normal sclera/conjunctiva [Normal Outer Ear/Nose] : the outer ears and nose were normal in appearance [No JVD] : no jugular venous distention [Normal Oropharynx] : the oropharynx was normal [Supple] : supple [No Lymphadenopathy] : no lymphadenopathy [No Respiratory Distress] : no respiratory distress  [Clear to Auscultation] : lungs were clear to auscultation bilaterally [No Accessory Muscle Use] : no accessory muscle use [Normal Rate] : normal rate  [Normal S1, S2] : normal S1 and S2 [No Murmur] : no murmur heard [Regular Rhythm] : with a regular rhythm [No Carotid Bruits] : no carotid bruits [No Varicosities] : no varicosities [Pedal Pulses Present] : the pedal pulses are present [Soft] : abdomen soft [No Extremity Clubbing/Cyanosis] : no extremity clubbing/cyanosis [Non-distended] : non-distended [Non Tender] : non-tender [Normal Bowel Sounds] : normal bowel sounds [Normal Anterior Cervical Nodes] : no anterior cervical lymphadenopathy [No CVA Tenderness] : no CVA  tenderness [No Joint Swelling] : no joint swelling [No Spinal Tenderness] : no spinal tenderness [No Rash] : no rash [Grossly Normal Strength/Tone] : grossly normal strength/tone [Coordination Grossly Intact] : coordination grossly intact [Normal Gait] : normal gait [No Focal Deficits] : no focal deficits [Alert and Oriented x3] : oriented to person, place, and time [de-identified] : trace bilateral pitting edema intermediate up, R>L and improved from last visit

## 2024-02-25 NOTE — PROGRESS NOTE ADULT - SUBJECTIVE AND OBJECTIVE BOX
Vascular Surgery Progress Note     Subjective:  Patient seen and examined on AM rounds. Endorses improved RLE pain, as well as improved dizziness and SUGGS.      Vital Signs:  Vital Signs Last 24 Hrs  T(C): 36.7 (25 Feb 2024 04:18), Max: 36.8 (24 Feb 2024 20:31)  T(F): 98.1 (25 Feb 2024 04:18), Max: 98.2 (24 Feb 2024 20:31)  HR: 73 (25 Feb 2024 04:18) (70 - 82)  BP: 124/69 (25 Feb 2024 04:18) (123/69 - 142/70)  BP(mean): --  RR: 18 (25 Feb 2024 04:18) (18 - 18)  SpO2: 97% (25 Feb 2024 04:18) (95% - 99%)    Parameters below as of 25 Feb 2024 04:18  Patient On (Oxygen Delivery Method): room air        CAPILLARY BLOOD GLUCOSE      POCT Blood Glucose.: 107 mg/dL (24 Feb 2024 21:17)  POCT Blood Glucose.: 112 mg/dL (24 Feb 2024 17:08)  POCT Blood Glucose.: 95 mg/dL (24 Feb 2024 11:05)      I&O's Detail    24 Feb 2024 07:01  -  25 Feb 2024 07:00  --------------------------------------------------------  IN:    Oral Fluid: 20 mL  Total IN: 20 mL    OUT:  Total OUT: 0 mL    Total NET: 20 mL            Physical Exam:  General: NAD, resting comfortably in bed  HEENT: Normocephalic atraumatic  Respiratory: Nonlabored respirations  Cardio: regular rate  Vascular: RLE > LLE swelling, no phlegmasia or skin changes, mild R calf tenderness, motor/sensation intact b/l, palpable DP b/l    Labs:    02-25    142  |  105  |  35<H>  ----------------------------<  111<H>  3.8   |  25  |  1.40<H>    Ca    9.5      25 Feb 2024 05:25  Mg     2.0     02-25                              12.0   8.12  )-----------( 209      ( 25 Feb 2024 05:25 )             37.3     PT/INR - ( 24 Feb 2024 05:22 )   PT: 13.2 sec;   INR: 1.21 ratio         PTT - ( 25 Feb 2024 05:25 )  PTT:52.5 sec

## 2024-02-25 NOTE — PROVIDER CONTACT NOTE (OTHER) - ACTION/TREATMENT ORDERED:
As per provider, no subsequent bolus ordered for patient, continue with nomogram. Hep gtt rate increased to 8 ml/hr, next PTT to be drawn at 03:44 2/25. Care ongoing.
Following nomogram. Paused hep gtt for 60 minutes, restarted at 8 ml/hr. Next PTT scheduled for 8:28 AM. Care ongoing.
No new orders. Will continue to monitor the patient.

## 2024-02-25 NOTE — HEALTH RISK ASSESSMENT
[0] : 2) Feeling down, depressed, or hopeless: Not at all (0) [PHQ-2 Negative - No further assessment needed] : PHQ-2 Negative - No further assessment needed [Never] : Never [UIS2Ohrea] : 0

## 2024-02-25 NOTE — PROVIDER CONTACT NOTE (OTHER) - ASSESSMENT
Patient is AOx4, VSS. No complaint of chest pain sob or palpitations. No s/s of bleeding noted on assessment.
Patient A& O x 4.  patient stated  felt slight dizziness while sitting in recliner after she worked with PT. feeling better now. Orthostatic BP done. Lying- 12/66 Hr- 69, Sitting-115/72, Hr- 67,  Standing- 103/67, Hr- 83.
Patient is AOx4, VSS. No complaint of chest pain, sob or palpitations. No s/s of bleeding noted on assessment.

## 2024-02-25 NOTE — PROVIDER CONTACT NOTE (OTHER) - REASON
Patient c/o of feeling dizziness after walking with PT.
Lab called with critical value
Patient APTT came back subtherapeutic 42.3

## 2024-02-25 NOTE — HISTORY OF PRESENT ILLNESS
[FreeTextEntry1] : 2 week f/u [de-identified] : This is a 72 y/o female with a PMHx of CKD, HTN, pulm HTN presents today for 2 week f/u.  Saw Pt 2 weeks ago and continued her on lasix 40mg BID. Since then, she reports mild improvement in her breathing and LE swelling but says her right leg is still swollen. Says she has been urinating more. Denies Hx of blood clots.   Denies chest pain, SOB, SUGGS, dizziness, diaphoresis, palpitations, LE swelling, orthopnea, syncope, n/v, headache.

## 2024-02-25 NOTE — PROVIDER CONTACT NOTE (OTHER) - BACKGROUND
Dx: LLE DVT  Hx: DVT, HTN, LUCIANO, CKD
Dx: acute embolism and thrombosis of deep vein of lower extremity  Hx: aundrea, ra, dvt, htn, chf
Admitted for PE/ DVT, chronic CHF.

## 2024-02-25 NOTE — PROGRESS NOTE ADULT - SUBJECTIVE AND OBJECTIVE BOX
DATE OF SERVICE: 02-25-24 @ 15:10    Patient is a 73y old  Female who presents with a chief complaint of RLE pain swelling (25 Feb 2024 10:02)      INTERVAL HISTORY: no complaints     REVIEW OF SYSTEMS:  CONSTITUTIONAL: No weakness  EYES/ENT: No visual changes;  No throat pain   NECK: No pain or stiffness  RESPIRATORY: No cough, wheezing; No shortness of breath  CARDIOVASCULAR: No chest pain or palpitations  GASTROINTESTINAL: No abdominal  pain. No nausea, vomiting, or hematemesis  GENITOURINARY: No dysuria, frequency or hematuria  NEUROLOGICAL: No stroke like symptoms  SKIN: No rashes     	  MEDICATIONS:  amLODIPine   Tablet 10 milliGRAM(s) Oral daily  furosemide    Tablet 40 milliGRAM(s) Oral two times a day        PHYSICAL EXAM:  T(C): 37.2 (02-25-24 @ 12:02), Max: 37.2 (02-25-24 @ 12:02)  HR: 69 (02-25-24 @ 12:02) (69 - 85)  BP: 112/69 (02-25-24 @ 12:02) (112/69 - 129/77)  RR: 18 (02-25-24 @ 12:02) (18 - 18)  SpO2: 99% (02-25-24 @ 12:02) (95% - 99%)  Wt(kg): --  I&O's Summary    24 Feb 2024 07:01  -  25 Feb 2024 07:00  --------------------------------------------------------  IN: 20 mL / OUT: 0 mL / NET: 20 mL    25 Feb 2024 07:01  -  25 Feb 2024 15:10  --------------------------------------------------------  IN: 520 mL / OUT: 0 mL / NET: 520 mL          Appearance: In no distress	  HEENT:    PERRL, EOMI	  Cardiovascular:  S1 S2, No JVD  Respiratory: Lungs clear to auscultation	  Gastrointestinal:  Soft, Non-tender, + BS	  Vascularature:  No edema of LE  Psychiatric: Appropriate affect   Neuro: no acute focal deficits                               12.0   8.12  )-----------( 209      ( 25 Feb 2024 05:25 )             37.3     02-25    142  |  105  |  35<H>  ----------------------------<  111<H>  3.8   |  25  |  1.40<H>    Ca    9.5      25 Feb 2024 05:25  Mg     2.0     02-25          Labs personally reviewed      ASSESSMENT/PLAN: 	      72 yo f with h/o pHTN, HTN, HLD, CKD3, RA, LUCIANO, ?CHF presented with RLE swelling and pain for 1 month found to have RLE extensive DVT on outpt study, Patient has had BILATERAL le swelling for about 1 month and has been treated with increased dose of lasix with Dr. Corley/Dr. Lorenzo.     1. DVT/PE  - US RLE + DVT  - CTA lung "chronic" segmental RML PE with evidence of infarct   - Vascular surgery and vascular cardiology consulted for possible thrombolysis or thrombectomy, recs appreciated   - c/w heparin gtt  - MR venogram done f/u results  - Vascular cards and vascular surg FU  - MR venogram done - DVT of L EIA and common femoral, no proximal extension into IVC  - Transition to oral DOAC on dc     2. HFpEF  - Pt follows w/ Dr. Corley as primary cardiologist.  Currently taking Aldactone 25mg qd and Lasix 40 mg qd  - Outpt increased dose of lasix from 40mg daily to BID for LE edema. Had improved in left leg only likely in setting of RLE DVT   - TTE 5/2023-  EF 60-66% The left ventricular wall thickness is mildly increased. mild to mod AR.  Estimated pulmonary artery systolic pressure is 43 mmHg, consistent with mild pulmonary   - appears euvolemic   - await new TTE  - TTE 2/23 hyperdynamic EF, no intracardiac shunt     3. HTN  - c/w home medications    4.HLD  - c/w atorvastatin 10mg qd    5. CKD  - creat 1.34 - recent increase in lasix dose to 40mg BID, on hold for now  - mon renal function             LUIS CARLOS Yoo, DO [facility]  Cardiovascular Medicine  [address], [address]  Office: [phone]  Available via call/text on [employer] Teams  DATE OF SERVICE: 02-25-24 @ 15:10    Patient is a 73y old  Female who presents with a chief complaint of RLE pain swelling (25 Feb 2024 10:02)      INTERVAL HISTORY: no complaints     REVIEW OF SYSTEMS:  CONSTITUTIONAL: No weakness  EYES/ENT: No visual changes;  No throat pain   NECK: No pain or stiffness  RESPIRATORY: No cough, wheezing; No shortness of breath  CARDIOVASCULAR: No chest pain or palpitations  GASTROINTESTINAL: No abdominal  pain. No nausea, vomiting, or hematemesis  GENITOURINARY: No dysuria, frequency or hematuria  NEUROLOGICAL: No stroke like symptoms  SKIN: No rashes     	  MEDICATIONS:  amLODIPine   Tablet 10 milliGRAM(s) Oral daily  furosemide    Tablet 40 milliGRAM(s) Oral two times a day        PHYSICAL EXAM:  T(C): 37.2 (02-25-24 @ 12:02), Max: 37.2 (02-25-24 @ 12:02)  HR: 69 (02-25-24 @ 12:02) (69 - 85)  BP: 112/69 (02-25-24 @ 12:02) (112/69 - 129/77)  RR: 18 (02-25-24 @ 12:02) (18 - 18)  SpO2: 99% (02-25-24 @ 12:02) (95% - 99%)  Wt(kg): --  I&O's Summary    24 Feb 2024 07:01  -  25 Feb 2024 07:00  --------------------------------------------------------  IN: 20 mL / OUT: 0 mL / NET: 20 mL    25 Feb 2024 07:01  -  25 Feb 2024 15:10  --------------------------------------------------------  IN: 520 mL / OUT: 0 mL / NET: 520 mL          Appearance: In no distress	  HEENT:    PERRL, EOMI	  Cardiovascular:  S1 S2, No JVD  Respiratory: Lungs clear to auscultation	  Gastrointestinal:  Soft, Non-tender, + BS	  Vascularature:  No edema of LE  Psychiatric: Appropriate affect   Neuro: no acute focal deficits                               12.0   8.12  )-----------( 209      ( 25 Feb 2024 05:25 )             37.3     02-25    142  |  105  |  35<H>  ----------------------------<  111<H>  3.8   |  25  |  1.40<H>    Ca    9.5      25 Feb 2024 05:25  Mg     2.0     02-25          Labs personally reviewed      ASSESSMENT/PLAN: 	      74 yo f with h/o pHTN, HTN, HLD, CKD3, RA, LUCIANO, ?CHF presented with RLE swelling and pain for 1 month found to have RLE extensive DVT on outpt study, Patient has had BILATERAL le swelling for about 1 month and has been treated with increased dose of lasix with Dr. Corley/Dr. Lorenzo.     1. DVT/PE  - US RLE + DVT  - CTA lung "chronic" segmental RML PE with evidence of infarct   - Vascular surgery and vascular cardiology consulted   - c/w heparin gtt,  Transition to oral DOAC on dc   - Vascular cards and vascular surg FU  - MR venogram done - DVT of L EIA and common femoral, no proximal extension into IVC  - Transition to oral DOAC on dc     2. HFpEF  - Pt follows w/ Dr. Corley as primary cardiologist.  Currently taking Aldactone 25mg qd and Lasix 40 mg qd  - Outpt increased dose of lasix from 40mg daily to BID for LE edema.   - TTE 5/2023-  EF 60-66% The left ventricular wall thickness is mildly increased. mild to mod AR.  Estimated pulmonary artery systolic pressure is 43 mmHg, consistent with mild pulmonary   - appears euvolemic   - await new TTE  - TTE 2/23 hyperdynamic EF, no intracardiac shunt   - Lasix 40mg PO BID, will trend Cr and consider decreasing to Lasix 40mg PO daily    3. HTN  - c/w home medications    4.HLD  - c/w atorvastatin 10mg qd    5. CKD  - mon renal function               LUIS CARLOS Yoo DO [facility]  Cardiovascular Medicine  [address], [address]  Office: [phone]  Available via call/text on Microsoft Teams

## 2024-02-25 NOTE — PROGRESS NOTE ADULT - SUBJECTIVE AND OBJECTIVE BOX
[facility] Division of Hospital Medicine  Willi Arguello MD  Available via MS Teams    SUBJECTIVE / OVERNIGHT EVENTS: Patient seen and examined at bedside, resting comfortably and in no acute distress. Denies chest pain, palpitations. Denies shortness of breath at rest. Denies hemoptysis. ROS otherwise noncontributory.     MEDICATIONS  (STANDING):  allopurinol 100 milliGRAM(s) Oral daily  amLODIPine   Tablet 10 milliGRAM(s) Oral daily  atorvastatin 10 milliGRAM(s) Oral at bedtime  chlorhexidine 2% Cloths 1 Application(s) Topical daily  dextrose 5%. 1000 milliLiter(s) (100 mL/Hr) IV Continuous <Continuous>  dextrose 5%. 1000 milliLiter(s) (50 mL/Hr) IV Continuous <Continuous>  dextrose 50% Injectable 12.5 Gram(s) IV Push once  dextrose 50% Injectable 25 Gram(s) IV Push once  dextrose 50% Injectable 25 Gram(s) IV Push once  furosemide    Tablet 40 milliGRAM(s) Oral two times a day  glucagon  Injectable 1 milliGRAM(s) IntraMuscular once  heparin   Injectable 9000 Unit(s) IV Push once  heparin  Infusion.  Unit(s)/Hr (20 mL/Hr) IV Continuous <Continuous>  hydroxychloroquine 200 milliGRAM(s) Oral daily  insulin lispro (ADMELOG) corrective regimen sliding scale   SubCutaneous at bedtime  insulin lispro (ADMELOG) corrective regimen sliding scale   SubCutaneous three times a day before meals    MEDICATIONS  (PRN):  acetaminophen     Tablet .. 650 milliGRAM(s) Oral every 6 hours PRN Temp greater or equal to 38C (100.4F), Mild Pain (1 - 3)  dextrose Oral Gel 15 Gram(s) Oral once PRN Blood Glucose LESS THAN 70 milliGRAM(s)/deciliter  heparin   Injectable 4500 Unit(s) IV Push every 6 hours PRN For aPTT between 40 - 57  heparin   Injectable 9000 Unit(s) IV Push every 6 hours PRN For aPTT less than 40    I&O's Summary    24 Feb 2024 07:01  -  25 Feb 2024 07:00  --------------------------------------------------------  IN: 20 mL / OUT: 0 mL / NET: 20 mL    PHYSICAL EXAM:  Vital Signs Last 24 Hrs  T(C): 36.7 (25 Feb 2024 04:18), Max: 36.8 (24 Feb 2024 20:31)  T(F): 98.1 (25 Feb 2024 04:18), Max: 98.2 (24 Feb 2024 20:31)  HR: 73 (25 Feb 2024 04:18) (70 - 82)  BP: 124/69 (25 Feb 2024 04:18) (123/69 - 142/70)  RR: 18 (25 Feb 2024 04:18) (18 - 18)  SpO2: 97% (25 Feb 2024 04:18) (95% - 98%)    Parameters below as of 25 Feb 2024 04:18  Patient On (Oxygen Delivery Method): room air    CONSTITUTIONAL: NAD, well-groomed  EYES: PERRLA; conjunctiva and sclera clear  ENMT: Moist oral mucosa, no pharyngeal injection or exudates; normal dentition  NECK: Supple, no palpable masses; no thyromegaly  RESPIRATORY: Normal respiratory effort; lungs are clear to auscultation bilaterally  CARDIOVASCULAR: normal S1 and S2, no murmur/rub/gallop; No lower extremity edema  ABDOMEN: Nontender to palpation, normoactive bowel sounds, no rebound/guarding; No hepatosplenomegaly  MUSCULOSKELETAL:  no clubbing or cyanosis of digits; no joint swelling or tenderness to palpation  PSYCH: A+O to person, place, and time; affect appropriate  NEUROLOGY: CN 2-12 are intact and symmetric; no gross sensory deficits   SKIN: No rashes; no palpable lesions    LABS:                        12.0   8.12  )-----------( 209      ( 25 Feb 2024 05:25 )             37.3     02-25    142  |  105  |  35<H>  ----------------------------<  111<H>  3.8   |  25  |  1.40<H>    Ca    9.5      25 Feb 2024 05:25  Mg     2.0     02-25      PT/INR - ( 24 Feb 2024 05:22 )   PT: 13.2 sec;   INR: 1.21 ratio         PTT - ( 25 Feb 2024 05:25 )  PTT:52.5 sec      Urinalysis Basic - ( 25 Feb 2024 05:25 )    Color: x / Appearance: x / SG: x / pH: x  Gluc: 111 mg/dL / Ketone: x  / Bili: x / Urobili: x   Blood: x / Protein: x / Nitrite: x   Leuk Esterase: x / RBC: x / WBC x   Sq Epi: x / Non Sq Epi: x / Bacteria: x

## 2024-02-26 ENCOUNTER — RX RENEWAL (OUTPATIENT)
Age: 74
End: 2024-02-26

## 2024-02-26 LAB
APTT BLD: 81.8 SEC — HIGH (ref 24.5–35.6)
GLUCOSE BLDC GLUCOMTR-MCNC: 110 MG/DL — HIGH (ref 70–99)
GLUCOSE BLDC GLUCOMTR-MCNC: 115 MG/DL — HIGH (ref 70–99)
GLUCOSE BLDC GLUCOMTR-MCNC: 115 MG/DL — HIGH (ref 70–99)
GLUCOSE BLDC GLUCOMTR-MCNC: 129 MG/DL — HIGH (ref 70–99)
HCT VFR BLD CALC: 39.8 % — SIGNIFICANT CHANGE UP (ref 34.5–45)
HGB BLD-MCNC: 12.5 G/DL — SIGNIFICANT CHANGE UP (ref 11.5–15.5)
MCHC RBC-ENTMCNC: 29.6 PG — SIGNIFICANT CHANGE UP (ref 27–34)
MCHC RBC-ENTMCNC: 31.4 GM/DL — LOW (ref 32–36)
MCV RBC AUTO: 94.1 FL — SIGNIFICANT CHANGE UP (ref 80–100)
NRBC # BLD: 0 /100 WBCS — SIGNIFICANT CHANGE UP (ref 0–0)
PLATELET # BLD AUTO: 211 K/UL — SIGNIFICANT CHANGE UP (ref 150–400)
RBC # BLD: 4.23 M/UL — SIGNIFICANT CHANGE UP (ref 3.8–5.2)
RBC # FLD: 13.3 % — SIGNIFICANT CHANGE UP (ref 10.3–14.5)
WBC # BLD: 7.99 K/UL — SIGNIFICANT CHANGE UP (ref 3.8–10.5)
WBC # FLD AUTO: 7.99 K/UL — SIGNIFICANT CHANGE UP (ref 3.8–10.5)

## 2024-02-26 PROCEDURE — 99233 SBSQ HOSP IP/OBS HIGH 50: CPT

## 2024-02-26 RX ORDER — ADHESIVE TAPE 3"X 2.3 YD
50 MCG TAPE, NON-MEDICATED TOPICAL
Qty: 90 | Refills: 1 | Status: ACTIVE | COMMUNITY
Start: 2024-02-26 | End: 1900-01-01

## 2024-02-26 RX ADMIN — Medication 40 MILLIGRAM(S): at 05:37

## 2024-02-26 RX ADMIN — Medication 650 MILLIGRAM(S): at 00:45

## 2024-02-26 RX ADMIN — Medication 200 MILLIGRAM(S): at 11:18

## 2024-02-26 RX ADMIN — HEPARIN SODIUM 1000 UNIT(S)/HR: 5000 INJECTION INTRAVENOUS; SUBCUTANEOUS at 09:41

## 2024-02-26 RX ADMIN — HEPARIN SODIUM 1000 UNIT(S)/HR: 5000 INJECTION INTRAVENOUS; SUBCUTANEOUS at 19:14

## 2024-02-26 RX ADMIN — Medication 40 MILLIGRAM(S): at 13:50

## 2024-02-26 RX ADMIN — AMLODIPINE BESYLATE 10 MILLIGRAM(S): 2.5 TABLET ORAL at 05:38

## 2024-02-26 RX ADMIN — CHLORHEXIDINE GLUCONATE 1 APPLICATION(S): 213 SOLUTION TOPICAL at 11:19

## 2024-02-26 RX ADMIN — Medication 100 MILLIGRAM(S): at 11:19

## 2024-02-26 RX ADMIN — HEPARIN SODIUM 1000 UNIT(S)/HR: 5000 INJECTION INTRAVENOUS; SUBCUTANEOUS at 07:15

## 2024-02-26 RX ADMIN — ATORVASTATIN CALCIUM 10 MILLIGRAM(S): 80 TABLET, FILM COATED ORAL at 21:06

## 2024-02-26 NOTE — PROGRESS NOTE ADULT - ASSESSMENT
73F pHTN, HTN, HLD, CKD3, RA, LUCIANO, ?CHF presented with outpt imaging +RLE DVT now with +PE, pending further intervention by vascular cardiology

## 2024-02-26 NOTE — PROGRESS NOTE ADULT - SUBJECTIVE AND OBJECTIVE BOX
Patient seen and examined at bedside.    Overnight Events: No acute events overnight. Denies chest pain or SOB      REVIEW OF SYSTEMS:  Constitutional:     [ x] negative [ ] fevers [ ] chills [ ] weight loss [ ] weight gain  HEENT:                  [ x] negative [ ] dry eyes [ ] eye irritation [ ] postnasal drip [ ] nasal congestion  CV:                         [x ] negative  [ ] chest pain [ ] orthopnea [ ] palpitations [ ] murmur  Resp:                     [ x] negative [ ] cough [ ] shortness of breath [ ] dyspnea [ ] wheezing [ ] sputum [ ]hemoptysis  GI:                          [ x] negative [ ] nausea [ ] vomiting [ ] diarrhea [ ] constipation [ ] abd pain [ ] dysphagia   :                        [ x] negative [ ] dysuria [ ] nocturia [ ] hematuria [ ] increased urinary frequency  Musculoskeletal: [ x] negative [ ] back pain [ ] myalgias [ ] arthralgias [ ] fracture  Skin:                       [ x] negative [ ] rash [ ] itch  Neurological:        [ x] negative [ ] headache [ ] dizziness [ ] syncope [ ] weakness [ ] numbness  Psychiatric:           [ x] negative [ ] anxiety [ ] depression  Endocrine:            [ x] negative [ ] diabetes [ ] thyroid problem  Heme/Lymph:      [ x] negative [ ] anemia [ ] bleeding problem  Allergic/Immune: [x ] negative [ ] itchy eyes [ ] nasal discharge [ ] hives [ ] angioedema    [x ] All other systems negative  [ ] Unable to assess ROS due to    Current Meds:  acetaminophen     Tablet .. 650 milliGRAM(s) Oral every 6 hours PRN  allopurinol 100 milliGRAM(s) Oral daily  amLODIPine   Tablet 10 milliGRAM(s) Oral daily  atorvastatin 10 milliGRAM(s) Oral at bedtime  chlorhexidine 2% Cloths 1 Application(s) Topical daily  dextrose 5%. 1000 milliLiter(s) IV Continuous <Continuous>  dextrose 5%. 1000 milliLiter(s) IV Continuous <Continuous>  dextrose 50% Injectable 12.5 Gram(s) IV Push once  dextrose 50% Injectable 25 Gram(s) IV Push once  dextrose 50% Injectable 25 Gram(s) IV Push once  dextrose Oral Gel 15 Gram(s) Oral once PRN  furosemide    Tablet 40 milliGRAM(s) Oral two times a day  glucagon  Injectable 1 milliGRAM(s) IntraMuscular once  heparin   Injectable 9000 Unit(s) IV Push every 6 hours PRN  heparin   Injectable 4500 Unit(s) IV Push every 6 hours PRN  heparin   Injectable 9000 Unit(s) IV Push once  heparin  Infusion.  Unit(s)/Hr IV Continuous <Continuous>  hydroxychloroquine 200 milliGRAM(s) Oral daily  insulin lispro (ADMELOG) corrective regimen sliding scale   SubCutaneous at bedtime  insulin lispro (ADMELOG) corrective regimen sliding scale   SubCutaneous three times a day before meals      PAST MEDICAL & SURGICAL HISTORY:  HTN (hypertension)      CKD (chronic kidney disease)      Pulmonary hypertension      HLD (hyperlipidemia)      RA (rheumatoid arthritis)      LUCIANO (obstructive sleep apnea)      Chronic CHF      S/P hysterectomy      S/P hernia surgery          Vitals:  T(F): 97.8 (02-26), Max: 98.9 (02-25)  HR: 71 (02-26) (69 - 86)  BP: 114/73 (02-26) (107/61 - 117/69)  RR: 18 (02-26)  SpO2: 97% (02-26)  I&O's Summary    25 Feb 2024 07:01  -  26 Feb 2024 07:00  --------------------------------------------------------  IN: 650 mL / OUT: 0 mL / NET: 650 mL    26 Feb 2024 07:01  -  26 Feb 2024 10:52  --------------------------------------------------------  IN: 280 mL / OUT: 0 mL / NET: 280 mL        Physical Exam:  Appearance: No acute distress; well appearing  Eyes: PERRL, EOMI, pink conjunctiva  HENT: Normal oral mucosa  Cardiovascular: RRR, S1, S2, no murmurs, rubs, or gallops; no edema; no JVD  Respiratory: Clear to auscultation bilaterally  Gastrointestinal: soft, non-tender, non-distended with normal bowel sounds  Musculoskeletal: No clubbing; no joint deformity   Neurologic: Non-focal  Lymphatic: No lymphadenopathy  Psychiatry: AAOx3, mood & affect appropriate  Skin: No rashes, ecchymoses, or cyanosis                          12.5   7.99  )-----------( 211      ( 26 Feb 2024 07:07 )             39.8     02-25    142  |  105  |  35<H>  ----------------------------<  111<H>  3.8   |  25  |  1.40<H>    Ca    9.5      25 Feb 2024 05:25  Mg     2.0     02-25      PTT - ( 26 Feb 2024 07:08 )  PTT:81.8 sec    CT Chest  IMPRESSION:  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 Findings are new since 2/3/2021.    No evidence of additional filling defect within the pulmonary arteries.    --- End of Report ---    US Duplex  IMPRESSION:    Deep venous thrombosis in the right lower extremity including the distal   right external iliac vein extending from the calf; thrombus in the right   common femoral vein appears mobile.    No evidence of deep venous thrombosis in the left lower extremity.    The findings were discussed with Dr. Lorenzo on 2/22/2024 6:19 PM    --- End of Report ---    MR Venogram A/P 2/24/24  LOWER CHEST: Within normal limits.    LIVER: Subcentimeter right hepatic lobe cyst.  BILE DUCTS: Normal caliber.  GALLBLADDER: Within normal limits.  SPLEEN: Within normal limits.  PANCREAS: Within normal limits.  ADRENALS: Within normal limits.  KIDNEYS/URETERS: Small left renal cyst.    BLADDER: Within normal limits.  REPRODUCTIVE ORGANS: Hysterectomy.    BOWEL: No bowel obstruction.  PERITONEUM: No ascites.  VESSELS: Acute deep venous thrombosis involving the distal right external   iliac vein and right common femoral vein. No left sided venous   thrombosis. IVC is patent.  RETROPERITONEUM/LYMPH NODES: No lymphadenopathy.  ABDOMINAL WALL: Within normal limits.  BONES: Within normal limits.    IMPRESSION:  Acute deep venous thrombosis involving the distal right external iliac   vein and right common femoral vein.

## 2024-02-26 NOTE — PROGRESS NOTE ADULT - ASSESSMENT
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     Vascular cardiology consulted for further management. Pt notes RLE > LLE swelling but denies significant pain or SOB/dyspnea. Has been pt of Dr. Corley's for many years, was told ~December she has pHTN by pulmonologist who she's seen since dx of pneumonia several years ago. No prior h/o DVT/PE, FHx of bleeding/clotting dz, remote surgeries (hysterectomy, umbilical hernia surgery), no recent immobilization or prolonged travel. No known h/o cancer and has been up todate and unremarkable with regards to cancer screening (colonoscopy, mammo, pap smears).         Recommendations:  - MR venogram 2/24 w/ DVT of L EIA and common femoral, no proximal extension into IVC  - C/w heparin gtt; anticipate transition to DOAC   - Continue with elevation of RLE  - Vascular surgery following, no plan for vascular surgery interventions  74 yo female PMH HTN, CKD, CHF, Pulm Hypertension presents with R DVT involving the external iliac and chronic thromboembolic disease involving the pulmonary arteries.     Vascular cardiology consulted for further management. Pt notes RLE > LLE swelling but denies significant pain or SOB/dyspnea. Has been pt of Dr. Corley's for many years, was told ~December she has pHTN by pulmonologist who she's seen since dx of pneumonia several years ago. No prior h/o DVT/PE, FHx of bleeding/clotting dz, remote surgeries (hysterectomy, umbilical hernia surgery), no recent immobilization or prolonged travel. No known h/o cancer and has been up todate and unremarkable with regards to cancer screening (colonoscopy, mammo, pap smears).         Recommendations:  - MR venogram 2/24 w/ DVT of L EIA and common femoral, no proximal extension into IVC  - C/w heparin gtt; anticipate transition to DOAC Eliquis on discharge  - Continue with elevation of RLE  - Vascular surgery following, no plan for vascular surgery interventions

## 2024-02-26 NOTE — PROGRESS NOTE ADULT - ASSESSMENT
74 yo female PMH HTN, CKD, CHF, Pulm Hypertension presents with acute right iliofemoral DVT, as well as PE. MR venogram 2/24 w/ DVT of L EIA and common femoral, no proximal extension into IVC    PLAN:   - Continue heparin gtt - can transition to oral AC or TLVX on discharge  - No planned vascular surgery interventions - patient can follow up in office w/ Dr. Grande - [phone] - [address] [place] [address]      Vascular Surgery  [id number]

## 2024-02-26 NOTE — PROGRESS NOTE ADULT - SUBJECTIVE AND OBJECTIVE BOX
Vascular Surgery Progress Note     Subjective:  Patient seen and examined on AM rounds. Patient feels like it is easier to get up and move around compared to a few days ago. Denies RLE pain.      Vital Signs:  Vital Signs Last 24 Hrs  T(C): 36.6 (26 Feb 2024 04:23), Max: 37.2 (25 Feb 2024 12:02)  T(F): 97.8 (26 Feb 2024 04:23), Max: 98.9 (25 Feb 2024 12:02)  HR: 71 (26 Feb 2024 04:23) (69 - 86)  BP: 114/73 (26 Feb 2024 04:23) (107/61 - 124/75)  BP(mean): --  RR: 18 (26 Feb 2024 04:23) (18 - 18)  SpO2: 97% (26 Feb 2024 04:23) (97% - 99%)    Parameters below as of 26 Feb 2024 04:23  Patient On (Oxygen Delivery Method): room air        CAPILLARY BLOOD GLUCOSE      POCT Blood Glucose.: 110 mg/dL (26 Feb 2024 07:49)  POCT Blood Glucose.: 120 mg/dL (25 Feb 2024 21:06)  POCT Blood Glucose.: 102 mg/dL (25 Feb 2024 17:32)  POCT Blood Glucose.: 145 mg/dL (25 Feb 2024 11:38)      I&O's Detail    25 Feb 2024 07:01  -  26 Feb 2024 07:00  --------------------------------------------------------  IN:    Heparin Infusion: 130 mL    Oral Fluid: 520 mL  Total IN: 650 mL    OUT:  Total OUT: 0 mL    Total NET: 650 mL      26 Feb 2024 07:01  -  26 Feb 2024 09:59  --------------------------------------------------------  IN:    Oral Fluid: 280 mL  Total IN: 280 mL    OUT:  Total OUT: 0 mL    Total NET: 280 mL            Physical Exam:  General: NAD, resting comfortably in bed  HEENT: Normocephalic atraumatic  Respiratory: Nonlabored respirations  Cardio: regular rate  Vascular: RLE edema, no phlegmasia or skin changes, motor/sensation intact b/l, palpable DP b/l      Labs:    02-25    142  |  105  |  35<H>  ----------------------------<  111<H>  3.8   |  25  |  1.40<H>    Ca    9.5      25 Feb 2024 05:25  Mg     2.0     02-25                              12.5   7.99  )-----------( 211      ( 26 Feb 2024 07:07 )             39.8     PTT - ( 26 Feb 2024 07:08 )  PTT:81.8 sec

## 2024-02-26 NOTE — PROGRESS NOTE ADULT - SUBJECTIVE AND OBJECTIVE BOX
Andre Reyes, M.D.  Pager: [phone]  Office: [phone]    Patient is a 73y old  Female who presents with a chief complaint of RLE pain swelling (26 Feb 2024 09:59)          SUBJECTIVE / OVERNIGHT EVENTS:    No acute overnight events.    ROS: ( - ) Fever, ( - )Chills,  ( - )Nausea/Vomiting, ( - ) Cough, ( - )Shortness of breath, ( - )Chest Pain    MEDICATIONS  (STANDING):  allopurinol 100 milliGRAM(s) Oral daily  amLODIPine   Tablet 10 milliGRAM(s) Oral daily  atorvastatin 10 milliGRAM(s) Oral at bedtime  chlorhexidine 2% Cloths 1 Application(s) Topical daily  dextrose 5%. 1000 milliLiter(s) (100 mL/Hr) IV Continuous <Continuous>  dextrose 5%. 1000 milliLiter(s) (50 mL/Hr) IV Continuous <Continuous>  dextrose 50% Injectable 12.5 Gram(s) IV Push once  dextrose 50% Injectable 25 Gram(s) IV Push once  dextrose 50% Injectable 25 Gram(s) IV Push once  furosemide    Tablet 40 milliGRAM(s) Oral two times a day  glucagon  Injectable 1 milliGRAM(s) IntraMuscular once  heparin   Injectable 9000 Unit(s) IV Push once  heparin  Infusion.  Unit(s)/Hr (20 mL/Hr) IV Continuous <Continuous>  hydroxychloroquine 200 milliGRAM(s) Oral daily  insulin lispro (ADMELOG) corrective regimen sliding scale   SubCutaneous at bedtime  insulin lispro (ADMELOG) corrective regimen sliding scale   SubCutaneous three times a day before meals    MEDICATIONS  (PRN):  acetaminophen     Tablet .. 650 milliGRAM(s) Oral every 6 hours PRN Temp greater or equal to 38C (100.4F), Mild Pain (1 - 3)  dextrose Oral Gel 15 Gram(s) Oral once PRN Blood Glucose LESS THAN 70 milliGRAM(s)/deciliter  heparin   Injectable 4500 Unit(s) IV Push every 6 hours PRN For aPTT between 40 - 57  heparin   Injectable 9000 Unit(s) IV Push every 6 hours PRN For aPTT less than 40          T(C): 36.6 (02-26 @ 04:23), Max: 37.2 (02-25 @ 12:02)   HR: 71   BP: 114/73   RR: 18   SpO2: 97%    PHYSICAL EXAM:    CONSTITUTIONAL: NAD, well-developed, well-groomed  EYES: PERRLA; conjunctiva and sclera clear  ENMT: Moist oral mucosa, no pharyngeal injection or exudates; normal dentition  NECK: Supple, no palpable masses; no thyromegaly  RESPIRATORY: Normal respiratory effort; lungs are clear to auscultation bilaterally  CARDIOVASCULAR: Regular rate and rhythm, normal S1 and S2, no murmur/rub/gallop; No lower extremity edema; Peripheral pulses are 2+ bilaterally  ABDOMEN: Nontender to palpation, normoactive bowel sounds, no rebound/guarding; No hepatosplenomegaly  MUSCULOSKELETAL:  no clubbing or cyanosis of digits; no joint swelling or tenderness to palpation  PSYCH: A+O to person, place, and time; affect appropriate  NEUROLOGY: CN 2-12 are intact and symmetric; no gross sensory deficits   SKIN: No rashes; no palpable lesions      LABS:                        12.5   7.99  )-----------( 211      ( 26 Feb 2024 07:07 )             39.8      02-25    142  |  105  |  35<H>  ----------------------------<  111<H>  3.8   |  25  |  1.40<H>    Ca    9.5      25 Feb 2024 05:25  Mg     2.0     02-25         CAPILLARY BLOOD GLUCOSE      POCT Blood Glucose.: 110 mg/dL (26 Feb 2024 07:49)  POCT Blood Glucose.: 120 mg/dL (25 Feb 2024 21:06)  POCT Blood Glucose.: 102 mg/dL (25 Feb 2024 17:32)  POCT Blood Glucose.: 145 mg/dL (25 Feb 2024 11:38)      RADIOLOGY & ADDITIONAL TESTS:    Imaging Personally Reviewed:  Consultant(s) Notes Reviewed:    Care Discussed with Consultants/Other Providers:   Andre Reyes, M.D.  Pager: [phone]  Office: [phone]    Patient is a 73y old  Female who presents with a chief complaint of RLE pain swelling (26 Feb 2024 09:59)          SUBJECTIVE / OVERNIGHT EVENTS:    No acute overnight events.  no new complaints    ROS: ( - ) Fever, ( - )Chills,  ( - )Nausea/Vomiting, ( - ) Cough, ( - )Shortness of breath, ( - )Chest Pain    MEDICATIONS  (STANDING):  allopurinol 100 milliGRAM(s) Oral daily  amLODIPine   Tablet 10 milliGRAM(s) Oral daily  atorvastatin 10 milliGRAM(s) Oral at bedtime  chlorhexidine 2% Cloths 1 Application(s) Topical daily  dextrose 5%. 1000 milliLiter(s) (100 mL/Hr) IV Continuous <Continuous>  dextrose 5%. 1000 milliLiter(s) (50 mL/Hr) IV Continuous <Continuous>  dextrose 50% Injectable 12.5 Gram(s) IV Push once  dextrose 50% Injectable 25 Gram(s) IV Push once  dextrose 50% Injectable 25 Gram(s) IV Push once  furosemide    Tablet 40 milliGRAM(s) Oral two times a day  glucagon  Injectable 1 milliGRAM(s) IntraMuscular once  heparin   Injectable 9000 Unit(s) IV Push once  heparin  Infusion.  Unit(s)/Hr (20 mL/Hr) IV Continuous <Continuous>  hydroxychloroquine 200 milliGRAM(s) Oral daily  insulin lispro (ADMELOG) corrective regimen sliding scale   SubCutaneous at bedtime  insulin lispro (ADMELOG) corrective regimen sliding scale   SubCutaneous three times a day before meals    MEDICATIONS  (PRN):  acetaminophen     Tablet .. 650 milliGRAM(s) Oral every 6 hours PRN Temp greater or equal to 38C (100.4F), Mild Pain (1 - 3)  dextrose Oral Gel 15 Gram(s) Oral once PRN Blood Glucose LESS THAN 70 milliGRAM(s)/deciliter  heparin   Injectable 4500 Unit(s) IV Push every 6 hours PRN For aPTT between 40 - 57  heparin   Injectable 9000 Unit(s) IV Push every 6 hours PRN For aPTT less than 40          T(C): 36.6 (02-26 @ 04:23), Max: 37.2 (02-25 @ 12:02)   HR: 71   BP: 114/73   RR: 18   SpO2: 97%    PHYSICAL EXAM:    CONSTITUTIONAL: NAD, well-developed, well-groomed  EYES: PERRLA; conjunctiva and sclera clear  ENMT: Moist oral mucosa, no pharyngeal injection or exudates; normal dentition  NECK: Supple, no palpable masses; no thyromegaly  RESPIRATORY: Normal respiratory effort; lungs are clear to auscultation bilaterally  CARDIOVASCULAR: Regular rate and rhythm, normal S1 and S2, no murmur/rub/gallop; No lower extremity edema; Peripheral pulses are 2+ bilaterally  ABDOMEN: Nontender to palpation, normoactive bowel sounds, no rebound/guarding; No hepatosplenomegaly  MUSCULOSKELETAL:  no clubbing or cyanosis of digits; no joint swelling or tenderness to palpation  PSYCH: A+O to person, place, and time; affect appropriate  NEUROLOGY: CN 2-12 are intact and symmetric; no gross sensory deficits   SKIN: No rashes; no palpable lesions      LABS:                        12.5   7.99  )-----------( 211      ( 26 Feb 2024 07:07 )             39.8      02-25    142  |  105  |  35<H>  ----------------------------<  111<H>  3.8   |  25  |  1.40<H>    Ca    9.5      25 Feb 2024 05:25  Mg     2.0     02-25         CAPILLARY BLOOD GLUCOSE      POCT Blood Glucose.: 110 mg/dL (26 Feb 2024 07:49)  POCT Blood Glucose.: 120 mg/dL (25 Feb 2024 21:06)  POCT Blood Glucose.: 102 mg/dL (25 Feb 2024 17:32)  POCT Blood Glucose.: 145 mg/dL (25 Feb 2024 11:38)      RADIOLOGY & ADDITIONAL TESTS:    Imaging Personally Reviewed:  Consultant(s) Notes Reviewed:    Care Discussed with Consultants/Other Providers:

## 2024-02-26 NOTE — PROGRESS NOTE ADULT - SUBJECTIVE AND OBJECTIVE BOX
DATE OF SERVICE: 02-26-24 @ 17:45    Patient is a 73y old  Female who presents with a chief complaint of RLE pain swelling (26 Feb 2024 10:51)      INTERVAL HISTORY: feels okay    REVIEW OF SYSTEMS:  CONSTITUTIONAL: No weakness  EYES/ENT: No visual changes;  No throat pain   NECK: No pain or stiffness  RESPIRATORY: No cough, wheezing; No shortness of breath  CARDIOVASCULAR: No chest pain or palpitations  GASTROINTESTINAL: No abdominal  pain. No nausea, vomiting, or hematemesis  GENITOURINARY: No dysuria, frequency or hematuria  NEUROLOGICAL: No stroke like symptoms  SKIN: No rashes    TELEMETRY Personally reviewed: NSR 70s  	  MEDICATIONS:  amLODIPine   Tablet 10 milliGRAM(s) Oral daily  furosemide    Tablet 40 milliGRAM(s) Oral two times a day        PHYSICAL EXAM:  T(C): 36.8 (02-26-24 @ 10:58), Max: 37.1 (02-25-24 @ 20:30)  HR: 77 (02-26-24 @ 16:46) (71 - 80)  BP: 121/65 (02-26-24 @ 16:46) (107/61 - 121/65)  RR: 18 (02-26-24 @ 10:58) (18 - 18)  SpO2: 98% (02-26-24 @ 10:58) (97% - 98%)  Wt(kg): --  I&O's Summary    25 Feb 2024 07:01  -  26 Feb 2024 07:00  --------------------------------------------------------  IN: 650 mL / OUT: 0 mL / NET: 650 mL    26 Feb 2024 07:01  -  26 Feb 2024 17:45  --------------------------------------------------------  IN: 560 mL / OUT: 0 mL / NET: 560 mL          Appearance: In no distress	  HEENT:    PERRL, EOMI	  Cardiovascular:  S1 S2, No JVD  Respiratory: Lungs clear to auscultation	  Gastrointestinal:  Soft, Non-tender, + BS	  Vascularature:  No edema of LE  Psychiatric: Appropriate affect   Neuro: no acute focal deficits                               12.5   7.99  )-----------( 211      ( 26 Feb 2024 07:07 )             39.8     02-25    142  |  105  |  35<H>  ----------------------------<  111<H>  3.8   |  25  |  1.40<H>    Ca    9.5      25 Feb 2024 05:25  Mg     2.0     02-25          Labs personally reviewed      ASSESSMENT/PLAN: 	  74 yo f with h/o pHTN, HTN, HLD, CKD3, RA, LUCIANO, ?CHF presented with RLE swelling and pain for 1 month found to have RLE extensive DVT on outpt study, Patient has had BILATERAL le swelling for about 1 month and has been treated with increased dose of lasix with Dr. Corley/Dr. Lorenzo.     1. DVT/PE  - US RLE + DVT  - CTA lung "chronic" segmental RML PE with evidence of infarct   - Vascular surgery and vascular cardiology consulted   - c/w heparin gtt,  Transition to oral DOAC on dc   - Vascular cards and vascular surg FU  - MR venogram done - DVT of L EIA and common femoral, no proximal extension into IVC  - Transition to oral DOAC on dc     2. HFpEF  - Pt follows w/ Dr. Corley as primary cardiologist.  Currently taking Aldactone 25mg qd and Lasix 40 mg qd  - Outpt increased dose of lasix from 40mg daily to BID for LE edema.   - TTE 5/2023-  EF 60-66% The left ventricular wall thickness is mildly increased. mild to mod AR.  Estimated pulmonary artery systolic pressure is 43 mmHg, consistent with mild pulmonary   - appears euvolemic   - await new TTE  - TTE 2/23 hyperdynamic EF, no intracardiac shunt   - Lasix 40mg PO BID, will trend Cr and consider decreasing to Lasix 40mg PO daily    3. HTN  - c/w home medications    4.HLD  - c/w atorvastatin 10mg qd    5. CKD  - mon renal function               Iolani Behrbom, AG-NP   Giancarlo Landeros DO [facility]  Cardiovascular Medicine  [address], [address]  Available through call or text on [employer]  Office: [phone]   DATE OF SERVICE: 02-26-24 @ 17:45    Patient is a 73y old  Female who presents with a chief complaint of RLE pain swelling (26 Feb 2024 10:51)      INTERVAL HISTORY: feels okay    REVIEW OF SYSTEMS:  CONSTITUTIONAL: No weakness  EYES/ENT: No visual changes;  No throat pain   NECK: No pain or stiffness  RESPIRATORY: No cough, wheezing; No shortness of breath  CARDIOVASCULAR: No chest pain or palpitations  GASTROINTESTINAL: No abdominal  pain. No nausea, vomiting, or hematemesis  GENITOURINARY: No dysuria, frequency or hematuria  NEUROLOGICAL: No stroke like symptoms  SKIN: No rashes    TELEMETRY Personally reviewed: NSR 70s  	  MEDICATIONS:  amLODIPine   Tablet 10 milliGRAM(s) Oral daily  furosemide    Tablet 40 milliGRAM(s) Oral two times a day        PHYSICAL EXAM:  T(C): 36.8 (02-26-24 @ 10:58), Max: 37.1 (02-25-24 @ 20:30)  HR: 77 (02-26-24 @ 16:46) (71 - 80)  BP: 121/65 (02-26-24 @ 16:46) (107/61 - 121/65)  RR: 18 (02-26-24 @ 10:58) (18 - 18)  SpO2: 98% (02-26-24 @ 10:58) (97% - 98%)  Wt(kg): --  I&O's Summary    25 Feb 2024 07:01  -  26 Feb 2024 07:00  --------------------------------------------------------  IN: 650 mL / OUT: 0 mL / NET: 650 mL    26 Feb 2024 07:01  -  26 Feb 2024 17:45  --------------------------------------------------------  IN: 560 mL / OUT: 0 mL / NET: 560 mL          Appearance: In no distress	  HEENT:    PERRL, EOMI	  Cardiovascular:  S1 S2, No JVD  Respiratory: Lungs clear to auscultation	  Gastrointestinal:  Soft, Non-tender, + BS	  Vascularature:  No edema of LE  Psychiatric: Appropriate affect   Neuro: no acute focal deficits                               12.5   7.99  )-----------( 211      ( 26 Feb 2024 07:07 )             39.8     02-25    142  |  105  |  35<H>  ----------------------------<  111<H>  3.8   |  25  |  1.40<H>    Ca    9.5      25 Feb 2024 05:25  Mg     2.0     02-25          Labs personally reviewed      ASSESSMENT/PLAN: 	  74 yo f with h/o pHTN, HTN, HLD, CKD3, RA, LUCIANO, ?CHF presented with RLE swelling and pain for 1 month found to have RLE extensive DVT on outpt study, Patient has had BILATERAL le swelling for about 1 month and has been treated with increased dose of lasix with Dr. Corley/Dr. Lorenzo.     1. DVT/PE  - US RLE + DVT  - CTA lung "chronic" segmental RML PE with evidence of infarct   - Vascular surgery and vascular cardiology consulted   - c/w heparin gtt,  Transition to oral DOAC on dc   - Vascular cards and vascular surg FU  - MR venogram done - DVT of L EIA and common femoral, no proximal extension into IVC  - Plan for vascular cards intervention Tuesday  - Transition to oral DOAC on dc     2. HFpEF  - Pt follows w/ Dr. Corley as primary cardiologist.  Currently taking Aldactone 25mg qd and Lasix 40 mg qd  - Outpt increased dose of lasix from 40mg daily to BID for LE edema.   - TTE 5/2023-  EF 60-66% The left ventricular wall thickness is mildly increased. mild to mod AR.  Estimated pulmonary artery systolic pressure is 43 mmHg, consistent with mild pulmonary   - appears euvolemic   - await new TTE  - TTE 2/23 hyperdynamic EF, no intracardiac shunt   - Lasix 40mg PO BID, will trend Cr and consider decreasing to Lasix 40mg PO daily    3. HTN  - c/w home medications    4.HLD  - c/w atorvastatin 10mg qd    5. CKD  - mon renal function               Iolani Behrbom, AG-NP   Giancarlonatalie Landeros DO [facility]  Cardiovascular Medicine  [address], [address]  Available through call or text on [employer]  Office: [phone]

## 2024-02-27 LAB
ALBUMIN SERPL ELPH-MCNC: 3.6 G/DL — SIGNIFICANT CHANGE UP (ref 3.3–5)
ALP SERPL-CCNC: 78 U/L — SIGNIFICANT CHANGE UP (ref 40–120)
ALT FLD-CCNC: 18 U/L — SIGNIFICANT CHANGE UP (ref 10–45)
ANION GAP SERPL CALC-SCNC: 11 MMOL/L — SIGNIFICANT CHANGE UP (ref 5–17)
APTT BLD: 87.4 SEC — HIGH (ref 24.5–35.6)
AST SERPL-CCNC: 22 U/L — SIGNIFICANT CHANGE UP (ref 10–40)
BILIRUB SERPL-MCNC: 0.5 MG/DL — SIGNIFICANT CHANGE UP (ref 0.2–1.2)
BLD GP AB SCN SERPL QL: NEGATIVE — SIGNIFICANT CHANGE UP
BUN SERPL-MCNC: 35 MG/DL — HIGH (ref 7–23)
CALCIUM SERPL-MCNC: 9.7 MG/DL — SIGNIFICANT CHANGE UP (ref 8.4–10.5)
CHLORIDE SERPL-SCNC: 101 MMOL/L — SIGNIFICANT CHANGE UP (ref 96–108)
CO2 SERPL-SCNC: 26 MMOL/L — SIGNIFICANT CHANGE UP (ref 22–31)
CREAT SERPL-MCNC: 1.4 MG/DL — HIGH (ref 0.5–1.3)
EGFR: 39 ML/MIN/1.73M2 — LOW
GLUCOSE BLDC GLUCOMTR-MCNC: 104 MG/DL — HIGH (ref 70–99)
GLUCOSE BLDC GLUCOMTR-MCNC: 117 MG/DL — HIGH (ref 70–99)
GLUCOSE BLDC GLUCOMTR-MCNC: 94 MG/DL — SIGNIFICANT CHANGE UP (ref 70–99)
GLUCOSE SERPL-MCNC: 114 MG/DL — HIGH (ref 70–99)
HCT VFR BLD CALC: 35.6 % — SIGNIFICANT CHANGE UP (ref 34.5–45)
HGB BLD-MCNC: 11.7 G/DL — SIGNIFICANT CHANGE UP (ref 11.5–15.5)
INR BLD: 1.24 RATIO — HIGH (ref 0.85–1.18)
MCHC RBC-ENTMCNC: 30.3 PG — SIGNIFICANT CHANGE UP (ref 27–34)
MCHC RBC-ENTMCNC: 32.9 GM/DL — SIGNIFICANT CHANGE UP (ref 32–36)
MCV RBC AUTO: 92.2 FL — SIGNIFICANT CHANGE UP (ref 80–100)
NRBC # BLD: 0 /100 WBCS — SIGNIFICANT CHANGE UP (ref 0–0)
PLATELET # BLD AUTO: 196 K/UL — SIGNIFICANT CHANGE UP (ref 150–400)
POTASSIUM SERPL-MCNC: 3.8 MMOL/L — SIGNIFICANT CHANGE UP (ref 3.5–5.3)
POTASSIUM SERPL-SCNC: 3.8 MMOL/L — SIGNIFICANT CHANGE UP (ref 3.5–5.3)
PROT SERPL-MCNC: 6.9 G/DL — SIGNIFICANT CHANGE UP (ref 6–8.3)
PROTHROM AB SERPL-ACNC: 12.9 SEC — SIGNIFICANT CHANGE UP (ref 9.5–13)
RBC # BLD: 3.86 M/UL — SIGNIFICANT CHANGE UP (ref 3.8–5.2)
RBC # FLD: 13.2 % — SIGNIFICANT CHANGE UP (ref 10.3–14.5)
RH IG SCN BLD-IMP: POSITIVE — SIGNIFICANT CHANGE UP
SODIUM SERPL-SCNC: 138 MMOL/L — SIGNIFICANT CHANGE UP (ref 135–145)
WBC # BLD: 8.26 K/UL — SIGNIFICANT CHANGE UP (ref 3.8–10.5)
WBC # FLD AUTO: 8.26 K/UL — SIGNIFICANT CHANGE UP (ref 3.8–10.5)

## 2024-02-27 PROCEDURE — 75825 VEIN X-RAY TRUNK: CPT | Mod: 26,XU

## 2024-02-27 PROCEDURE — 37252 INTRVASC US NONCORONARY 1ST: CPT

## 2024-02-27 PROCEDURE — 99152 MOD SED SAME PHYS/QHP 5/>YRS: CPT

## 2024-02-27 PROCEDURE — 37248 TRLUML BALO ANGIOP 1ST VEIN: CPT

## 2024-02-27 PROCEDURE — 75820 VEIN X-RAY ARM/LEG: CPT | Mod: 26,XU

## 2024-02-27 PROCEDURE — 99233 SBSQ HOSP IP/OBS HIGH 50: CPT

## 2024-02-27 PROCEDURE — 36010 PLACE CATHETER IN VEIN: CPT

## 2024-02-27 PROCEDURE — 37187 VENOUS MECH THROMBECTOMY: CPT

## 2024-02-27 PROCEDURE — 99233 SBSQ HOSP IP/OBS HIGH 50: CPT | Mod: 57

## 2024-02-27 RX ORDER — SODIUM CHLORIDE 9 MG/ML
250 INJECTION INTRAMUSCULAR; INTRAVENOUS; SUBCUTANEOUS
Refills: 0 | Status: DISCONTINUED | OUTPATIENT
Start: 2024-02-27 | End: 2024-02-28

## 2024-02-27 RX ORDER — HEPARIN SODIUM 5000 [USP'U]/ML
9000 INJECTION INTRAVENOUS; SUBCUTANEOUS EVERY 6 HOURS
Refills: 0 | Status: DISCONTINUED | OUTPATIENT
Start: 2024-02-27 | End: 2024-02-28

## 2024-02-27 RX ORDER — HEPARIN SODIUM 5000 [USP'U]/ML
4500 INJECTION INTRAVENOUS; SUBCUTANEOUS EVERY 6 HOURS
Refills: 0 | Status: DISCONTINUED | OUTPATIENT
Start: 2024-02-27 | End: 2024-02-28

## 2024-02-27 RX ORDER — HEPARIN SODIUM 5000 [USP'U]/ML
1000 INJECTION INTRAVENOUS; SUBCUTANEOUS
Qty: 25000 | Refills: 0 | Status: DISCONTINUED | OUTPATIENT
Start: 2024-02-27 | End: 2024-02-28

## 2024-02-27 RX ORDER — SODIUM CHLORIDE 9 MG/ML
1000 INJECTION INTRAMUSCULAR; INTRAVENOUS; SUBCUTANEOUS
Refills: 0 | Status: DISCONTINUED | OUTPATIENT
Start: 2024-02-27 | End: 2024-02-28

## 2024-02-27 RX ADMIN — HEPARIN SODIUM 1000 UNIT(S)/HR: 5000 INJECTION INTRAVENOUS; SUBCUTANEOUS at 07:23

## 2024-02-27 RX ADMIN — HEPARIN SODIUM 1000 UNIT(S)/HR: 5000 INJECTION INTRAVENOUS; SUBCUTANEOUS at 19:20

## 2024-02-27 RX ADMIN — HEPARIN SODIUM 1000 UNIT(S)/HR: 5000 INJECTION INTRAVENOUS; SUBCUTANEOUS at 13:13

## 2024-02-27 RX ADMIN — CHLORHEXIDINE GLUCONATE 1 APPLICATION(S): 213 SOLUTION TOPICAL at 12:47

## 2024-02-27 RX ADMIN — Medication 40 MILLIGRAM(S): at 06:02

## 2024-02-27 RX ADMIN — SODIUM CHLORIDE 100 MILLILITER(S): 9 INJECTION INTRAMUSCULAR; INTRAVENOUS; SUBCUTANEOUS at 19:24

## 2024-02-27 RX ADMIN — Medication 100 MILLIGRAM(S): at 12:47

## 2024-02-27 RX ADMIN — Medication 200 MILLIGRAM(S): at 12:47

## 2024-02-27 RX ADMIN — AMLODIPINE BESYLATE 10 MILLIGRAM(S): 2.5 TABLET ORAL at 06:02

## 2024-02-27 RX ADMIN — Medication 40 MILLIGRAM(S): at 13:15

## 2024-02-27 RX ADMIN — HEPARIN SODIUM 1000 UNIT(S)/HR: 5000 INJECTION INTRAVENOUS; SUBCUTANEOUS at 06:45

## 2024-02-27 NOTE — PROGRESS NOTE ADULT - PROBLEM SELECTOR PLAN 6
On home hydroxychloroquin   cont

## 2024-02-27 NOTE — PROGRESS NOTE ADULT - SUBJECTIVE AND OBJECTIVE BOX
DATE OF SERVICE: 02-27-24 @ 10:43    Patient is a 73y old  Female who presents with a chief complaint of RLE pain swelling (27 Feb 2024 10:32)      INTERVAL HISTORY: Feels well, denies chest pain and SOB    REVIEW OF SYSTEMS:  CONSTITUTIONAL: No weakness  EYES/ENT: No visual changes;  No throat pain   NECK: No pain or stiffness  RESPIRATORY: No cough, wheezing; No shortness of breath  CARDIOVASCULAR: No chest pain or palpitations  GASTROINTESTINAL: No abdominal  pain. No nausea, vomiting, or hematemesis  GENITOURINARY: No dysuria, frequency or hematuria  NEUROLOGICAL: No stroke like symptoms  SKIN: No rashes    TELEMETRY Personally reviewed: SR 60-80  	  MEDICATIONS:  amLODIPine   Tablet 10 milliGRAM(s) Oral daily  furosemide    Tablet 40 milliGRAM(s) Oral two times a day        PHYSICAL EXAM:  T(C): 36.8 (02-27-24 @ 04:42), Max: 37.1 (02-26-24 @ 20:40)  HR: 68 (02-27-24 @ 04:42) (68 - 80)  BP: 104/65 (02-27-24 @ 04:42) (104/65 - 121/65)  RR: 18 (02-27-24 @ 04:42) (18 - 18)  SpO2: 99% (02-27-24 @ 04:42) (98% - 99%)  Wt(kg): --  I&O's Summary    26 Feb 2024 07:01  -  27 Feb 2024 07:00  --------------------------------------------------------  IN: 680 mL / OUT: 0 mL / NET: 680 mL    27 Feb 2024 07:01  -  27 Feb 2024 10:43  --------------------------------------------------------  IN: 220 mL / OUT: 0 mL / NET: 220 mL          Appearance: In no distress	  HEENT:    PERRL, EOMI	  Cardiovascular:  S1 S2, No JVD  Respiratory: Lungs clear to auscultation	  Gastrointestinal:  Soft, Non-tender, + BS	  Vascularature:  No edema of LE  Psychiatric: Appropriate affect   Neuro: no acute focal deficits                               11.7   8.26  )-----------( 196      ( 27 Feb 2024 06:21 )             35.6     02-27    138  |  101  |  35<H>  ----------------------------<  114<H>  3.8   |  26  |  1.40<H>    Ca    9.7      27 Feb 2024 06:21    TPro  6.9  /  Alb  3.6  /  TBili  0.5  /  DBili  x   /  AST  22  /  ALT  18  /  AlkPhos  78  02-27        Labs personally reviewed      ASSESSMENT/PLAN: 	  74 yo f with h/o pHTN, HTN, HLD, CKD3, RA, LUCIANO, ?CHF presented with RLE swelling and pain for 1 month found to have RLE extensive DVT on outpt study, Patient has had BILATERAL le swelling for about 1 month and has been treated with increased dose of lasix with Dr. Corley/Dr. Lorenzo.     1. DVT/PE  - US RLE + DVT  - CTA lung "chronic" segmental RML PE with evidence of infarct   - Vascular surgery and vascular cardiology consulted   - c/w heparin gtt,  Transition to oral DOAC on dc   - Vascular cards and vascular surg FU  - MR venogram done - DVT of L EIA and common femoral, no proximal extension into IVC  - Plan for vascular cards intervention Tuesday  - Transition to oral DOAC on dc     2. HFpEF  - Pt follows w/ Dr. Corley as primary cardiologist.  Currently taking Aldactone 25mg qd and Lasix 40 mg qd  - Outpt increased dose of lasix from 40mg daily to BID for LE edema.   - TTE 5/2023-  EF 60-66% The left ventricular wall thickness is mildly increased. mild to mod AR.  Estimated pulmonary artery systolic pressure is 43 mmHg, consistent with mild pulmonary   - appears euvolemic   - await new TTE  - TTE 2/23 hyperdynamic EF, no intracardiac shunt   - Lasix 40mg PO BID, will trend Cr and consider decreasing to Lasix 40mg PO daily    3. HTN  - c/w home medications    4.HLD  - c/w atorvastatin 10mg qd    5. CKD  - mon renal function           DEIDRA Ward DO [facility]  Cardiovascular Medicine  [address] [place] [address], [place] [address]  Available via call or text on [employer] TE[employer]  Office: [phone]

## 2024-02-27 NOTE — PROGRESS NOTE ADULT - ASSESSMENT
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     Vascular cardiology consulted for further management. Pt notes RLE > LLE swelling but denies significant pain or SOB/dyspnea. Has been pt of Dr. Corley's for many years, was told ~December she has pHTN by pulmonologist who she's seen since dx of pneumonia several years ago. No prior h/o DVT/PE, FHx of bleeding/clotting dz, remote surgeries (hysterectomy, umbilical hernia surgery), no recent immobilization or prolonged travel. No known h/o cancer and has been uptodate and unremarkable with regards to cancer screening (colonoscopy, mammo, pap smears).      Found to have chronic thomboembolic lateral segment right middle lobe pulmonary arteries, with associated right middle lobe pulmonary infarct as well as DVTs in distal right external iliac, common femoral, femoral, popliteal, posterior tibial, and peroneal veins; CFV DVT with mobile appearance. MR venogram 2/24 w/ DVT of L EIA and common femoral, no proximal extension into IVC. Pt overall hemodynamically stable.     Hobbs Score ~7-8 to stratify as mild Post-thrombotic Syndrome (+intermittent pain/cramping, mild skin induration). Overall, pt with Discussed with pt and daughter regarding the risks/benefits of conservative management and good outcomes with AC alone but pt elects to proceed with intervention, consented and plan for mechanical thrombectomy with  mechanical thrombectomy via [facility] 2/27.       Recommendations:  - Consented and plan for mechanical thrombectomy (MT) via [facility]ever 2/27  - Pt can have clear liquid breakfast but would maintain NPO thereafter  - C/w heparin gtt; anticipate transition to DOAC on discharge  - Continue with elevation of RLE  - Vascular surgery following, no plan for surgical intervention      Ozzie Mendoza MD  Cardiology Fellow PGY-5  [facility]    Notes are not final until signed by attending  For all consults and questions:  [email or web address]   Login: [email or web address]

## 2024-02-27 NOTE — PROGRESS NOTE ADULT - SUBJECTIVE AND OBJECTIVE BOX
Patient seen and examined at bedside.    Overnight Events: No acute events overnight. Denies chest pain or SOB      REVIEW OF SYSTEMS:  Constitutional:     [ x] negative [ ] fevers [ ] chills [ ] weight loss [ ] weight gain  HEENT:                  [ x] negative [ ] dry eyes [ ] eye irritation [ ] postnasal drip [ ] nasal congestion  CV:                         [x ] negative  [ ] chest pain [ ] orthopnea [ ] palpitations [ ] murmur  Resp:                     [ x] negative [ ] cough [ ] shortness of breath [ ] dyspnea [ ] wheezing [ ] sputum [ ]hemoptysis  GI:                          [ x] negative [ ] nausea [ ] vomiting [ ] diarrhea [ ] constipation [ ] abd pain [ ] dysphagia   :                        [ x] negative [ ] dysuria [ ] nocturia [ ] hematuria [ ] increased urinary frequency  Musculoskeletal: [ x] negative [ ] back pain [ ] myalgias [ ] arthralgias [ ] fracture  Skin:                       [ x] negative [ ] rash [ ] itch  Neurological:        [ x] negative [ ] headache [ ] dizziness [ ] syncope [ ] weakness [ ] numbness  Psychiatric:           [ x] negative [ ] anxiety [ ] depression  Endocrine:            [ x] negative [ ] diabetes [ ] thyroid problem  Heme/Lymph:      [ x] negative [ ] anemia [ ] bleeding problem  Allergic/Immune: [x ] negative [ ] itchy eyes [ ] nasal discharge [ ] hives [ ] angioedema    [x ] All other systems negative  [ ] Unable to assess ROS due to    Current Meds:  acetaminophen     Tablet .. 650 milliGRAM(s) Oral every 6 hours PRN  allopurinol 100 milliGRAM(s) Oral daily  amLODIPine   Tablet 10 milliGRAM(s) Oral daily  atorvastatin 10 milliGRAM(s) Oral at bedtime  chlorhexidine 2% Cloths 1 Application(s) Topical daily  dextrose 5%. 1000 milliLiter(s) IV Continuous <Continuous>  dextrose 5%. 1000 milliLiter(s) IV Continuous <Continuous>  dextrose 50% Injectable 12.5 Gram(s) IV Push once  dextrose 50% Injectable 25 Gram(s) IV Push once  dextrose 50% Injectable 25 Gram(s) IV Push once  dextrose Oral Gel 15 Gram(s) Oral once PRN  furosemide    Tablet 40 milliGRAM(s) Oral two times a day  glucagon  Injectable 1 milliGRAM(s) IntraMuscular once  heparin   Injectable 9000 Unit(s) IV Push every 6 hours PRN  heparin   Injectable 4500 Unit(s) IV Push every 6 hours PRN  heparin   Injectable 9000 Unit(s) IV Push once  heparin  Infusion.  Unit(s)/Hr IV Continuous <Continuous>  hydroxychloroquine 200 milliGRAM(s) Oral daily  insulin lispro (ADMELOG) corrective regimen sliding scale   SubCutaneous three times a day before meals  insulin lispro (ADMELOG) corrective regimen sliding scale   SubCutaneous at bedtime      PAST MEDICAL & SURGICAL HISTORY:  HTN (hypertension)      CKD (chronic kidney disease)      Pulmonary hypertension      HLD (hyperlipidemia)      RA (rheumatoid arthritis)      LUCIANO (obstructive sleep apnea)      Chronic CHF      S/P hysterectomy      S/P hernia surgery          Vitals:  T(F): 98.3 (02-27), Max: 98.8 (02-26)  HR: 68 (02-27) (68 - 80)  BP: 104/65 (02-27) (104/65 - 121/65)  RR: 18 (02-27)  SpO2: 99% (02-27)  I&O's Summary    26 Feb 2024 07:01  -  27 Feb 2024 07:00  --------------------------------------------------------  IN: 680 mL / OUT: 0 mL / NET: 680 mL    27 Feb 2024 07:01  -  27 Feb 2024 10:21  --------------------------------------------------------  IN: 220 mL / OUT: 0 mL / NET: 220 mL        Physical Exam:  Appearance: No acute distress; well appearing  Eyes: PERRL, EOMI, pink conjunctiva  HENT: Normal oral mucosa  Cardiovascular: RRR, S1, S2, no murmurs, rubs, or gallops; no edema; no JVD  Respiratory: Clear to auscultation bilaterally  Gastrointestinal: soft, non-tender, non-distended with normal bowel sounds  Musculoskeletal: No clubbing; no joint deformity   Neurologic: Non-focal  Lymphatic: No lymphadenopathy  Psychiatry: AAOx3, mood & affect appropriate  Skin: No rashes, ecchymoses, or cyanosis                          11.7   8.26  )-----------( 196      ( 27 Feb 2024 06:21 )             35.6     02-27    138  |  101  |  35<H>  ----------------------------<  114<H>  3.8   |  26  |  1.40<H>    Ca    9.7      27 Feb 2024 06:21    TPro  6.9  /  Alb  3.6  /  TBili  0.5  /  DBili  x   /  AST  22  /  ALT  18  /  AlkPhos  78  02-27    PT/INR - ( 27 Feb 2024 06:21 )   PT: 12.9 sec;   INR: 1.24 ratio         PTT - ( 27 Feb 2024 06:21 )  PTT:87.4 sec    CTA Chest 2/22/24  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 Findings are new since 2/3/2021.    No evidence of additional filling defect within the pulmonary arteries.      Bilateral LE Venous Duplex 2/22/24  Deep venous thrombosis in the right lower extremity including the distal   right external iliac vein extending from the calf; thrombus in the right   common femoral vein appears mobile.    No evidence of deep venous thrombosis in the left lower extremity.     MR Venogram A/P 2/24/24  VESSELS: Acute deep venous thrombosis involving the distal right external   iliac vein and right common femoral vein. No left sided venous   thrombosis. IVC is patent.     IMPRESSION:  Acute deep venous thrombosis involving the distal right external iliac   vein and right common femoral vein.      TTE 2/23/24   1. Left ventricular cavity is normal in size. Left ventricular systolic function is hyperdynamic. There are no regionalwall motion abnormalities seen.   2. Normal right ventricular cavity size and normal systolic function.   3. Agitated saline injection was negative for intracardiac shunt.    ________________________________________________________________________________________  FINDINGS:     Left Ventricle:  The left ventricular cavity is normal in size. Left ventricular systolic function is hyperdynamic with an ejection fraction visually estimated at >75%. There are no regional wall motion abnormalities seen.     Right Ventricle:  The right ventricular cavity is normal in size and normal systolic function. Tricuspid annular plane systolic excursion (TAPSE) is 2.3 cm (normal >=1.7 cm). Tricuspid annular tissue Doppler S' is 22.0 cm/s (normal >10 cm/s).     Left Atrium:  The left atrium is mildly dilated with an indexed volume of 30.83 ml/m².     Right Atrium:  The right atrium is normal in size with an indexed area of 6.59 cm²/m².     Interatrial Septum:  Agitated saline injection was negative for intracardiac shunt.     Aortic Valve:  Normal aortic valve.     Mitral Valve:  Structurally normal mitral valve with normal leaflet excursion. There is calcification of the mitral valve annulus.     Tricuspid Valve:  Normal tricuspid valve.     Pulmonic Valve:  Structurally normal pulmonic valve with normal leaflet excursion.     Pericardium:  No pericardial effusion seen.     Systemic Veins:  The inferior vena cava is normal in size (normal <2.1cm) with normal inspiratory collapse (normal >50%) consistent with normal right atrial pressure (~3, range 0-5mmHg).  ____________________________________________________________________  QUANTITATIVE DATA:  Left Ventricle Measurements: (Indexed to BSA)     IVSd (2D):   1.0 cm  LVPWd (2D):  1.0 cm  LVIDd (2D):  4.1 cm  LVIDs (2D):  2.3 cm  LV Mass:     138 g  62.8 g/m²  Visualized LV EF%: >75%     MV E Vmax: 0.48 m/s  MV A Vmax: 0.66 m/s  MV E/A:    0.73    Aorta Measurements: (Normal range) (Indexed to BSA)     Sinuses of Valsalva: 2.70 cm (2.7 - 3.3 cm)       Left Atrium Measurements: (Indexed to BSA)  LA Diam 2D: 3.60 cm    Right Ventricle Measurements:     TAPSE: 2.3 cm    Mitral Valve Measurements:     MV E Vmax: 0.5 m/s  MV A Vmax: 0.7 m/s  MV E/A:    0.7       Tricuspid Valve Measurements:    RA Pressure: 3 mmHg    ________________________________________________________________________________________  Electronically signed on 2/23/2024 at 5:09:19 PM by Hung Welsh MD

## 2024-02-27 NOTE — PROGRESS NOTE ADULT - PROBLEM SELECTOR PROBLEM 7
LUCIANO (obstructive sleep apnea)

## 2024-02-27 NOTE — PROGRESS NOTE ADULT - SUBJECTIVE AND OBJECTIVE BOX
Andre Reyes, M.D.  Pager: [phone]  Office: [phone]    Patient is a 73y old  Female who presents with a chief complaint of RLE pain swelling (26 Feb 2024 17:45)          SUBJECTIVE / OVERNIGHT EVENTS:    No acute overnight events.    ROS: ( - ) Fever, ( - )Chills,  ( - )Nausea/Vomiting, ( - ) Cough, ( - )Shortness of breath, ( - )Chest Pain    MEDICATIONS  (STANDING):  allopurinol 100 milliGRAM(s) Oral daily  amLODIPine   Tablet 10 milliGRAM(s) Oral daily  atorvastatin 10 milliGRAM(s) Oral at bedtime  chlorhexidine 2% Cloths 1 Application(s) Topical daily  dextrose 5%. 1000 milliLiter(s) (100 mL/Hr) IV Continuous <Continuous>  dextrose 5%. 1000 milliLiter(s) (50 mL/Hr) IV Continuous <Continuous>  dextrose 50% Injectable 12.5 Gram(s) IV Push once  dextrose 50% Injectable 25 Gram(s) IV Push once  dextrose 50% Injectable 25 Gram(s) IV Push once  furosemide    Tablet 40 milliGRAM(s) Oral two times a day  glucagon  Injectable 1 milliGRAM(s) IntraMuscular once  heparin   Injectable 9000 Unit(s) IV Push once  heparin  Infusion.  Unit(s)/Hr (20 mL/Hr) IV Continuous <Continuous>  hydroxychloroquine 200 milliGRAM(s) Oral daily  insulin lispro (ADMELOG) corrective regimen sliding scale   SubCutaneous three times a day before meals  insulin lispro (ADMELOG) corrective regimen sliding scale   SubCutaneous at bedtime    MEDICATIONS  (PRN):  acetaminophen     Tablet .. 650 milliGRAM(s) Oral every 6 hours PRN Temp greater or equal to 38C (100.4F), Mild Pain (1 - 3)  dextrose Oral Gel 15 Gram(s) Oral once PRN Blood Glucose LESS THAN 70 milliGRAM(s)/deciliter  heparin   Injectable 9000 Unit(s) IV Push every 6 hours PRN For aPTT less than 40  heparin   Injectable 4500 Unit(s) IV Push every 6 hours PRN For aPTT between 40 - 57          T(C): 36.8 (02-27 @ 04:42), Max: 37.1 (02-26 @ 20:40)   HR: 68   BP: 104/65   RR: 18   SpO2: 99%    PHYSICAL EXAM:    CONSTITUTIONAL: NAD, well-developed, well-groomed  EYES: PERRLA; conjunctiva and sclera clear  ENMT: Moist oral mucosa, no pharyngeal injection or exudates; normal dentition  NECK: Supple, no palpable masses; no thyromegaly  RESPIRATORY: Normal respiratory effort; lungs are clear to auscultation bilaterally  CARDIOVASCULAR: Regular rate and rhythm, normal S1 and S2, no murmur/rub/gallop; No lower extremity edema; Peripheral pulses are 2+ bilaterally  ABDOMEN: Nontender to palpation, normoactive bowel sounds, no rebound/guarding; No hepatosplenomegaly  MUSCULOSKELETAL:  no clubbing or cyanosis of digits; no joint swelling or tenderness to palpation  PSYCH: A+O to person, place, and time; affect appropriate  NEUROLOGY: CN 2-12 are intact and symmetric; no gross sensory deficits   SKIN: No rashes; no palpable lesions      LABS:                        11.7   8.26  )-----------( 196      ( 27 Feb 2024 06:21 )             35.6      02-27    138  |  101  |  35<H>  ----------------------------<  114<H>  3.8   |  26  |  1.40<H>    Ca    9.7      27 Feb 2024 06:21    TPro  6.9  /  Alb  3.6  /  TBili  0.5  /  DBili  x   /  AST  22  /  ALT  18  /  AlkPhos  78  02-27       CAPILLARY BLOOD GLUCOSE      POCT Blood Glucose.: 117 mg/dL (27 Feb 2024 07:36)  POCT Blood Glucose.: 115 mg/dL (26 Feb 2024 21:04)  POCT Blood Glucose.: 129 mg/dL (26 Feb 2024 17:05)  POCT Blood Glucose.: 115 mg/dL (26 Feb 2024 11:30)      RADIOLOGY & ADDITIONAL TESTS:    Imaging Personally Reviewed:  Consultant(s) Notes Reviewed:    Care Discussed with Consultants/Other Providers:   Andre Reyes, M.D.  Pager: [phone]  Office: [phone]    Patient is a 73y old  Female who presents with a chief complaint of RLE pain swelling (26 Feb 2024 17:45)          SUBJECTIVE / OVERNIGHT EVENTS:    No acute overnight events.  feels well today. no complaints    ROS: ( - ) Fever, ( - )Chills,  ( - )Nausea/Vomiting, ( - ) Cough, ( - )Shortness of breath, ( - )Chest Pain    MEDICATIONS  (STANDING):  allopurinol 100 milliGRAM(s) Oral daily  amLODIPine   Tablet 10 milliGRAM(s) Oral daily  atorvastatin 10 milliGRAM(s) Oral at bedtime  chlorhexidine 2% Cloths 1 Application(s) Topical daily  dextrose 5%. 1000 milliLiter(s) (100 mL/Hr) IV Continuous <Continuous>  dextrose 5%. 1000 milliLiter(s) (50 mL/Hr) IV Continuous <Continuous>  dextrose 50% Injectable 12.5 Gram(s) IV Push once  dextrose 50% Injectable 25 Gram(s) IV Push once  dextrose 50% Injectable 25 Gram(s) IV Push once  furosemide    Tablet 40 milliGRAM(s) Oral two times a day  glucagon  Injectable 1 milliGRAM(s) IntraMuscular once  heparin   Injectable 9000 Unit(s) IV Push once  heparin  Infusion.  Unit(s)/Hr (20 mL/Hr) IV Continuous <Continuous>  hydroxychloroquine 200 milliGRAM(s) Oral daily  insulin lispro (ADMELOG) corrective regimen sliding scale   SubCutaneous three times a day before meals  insulin lispro (ADMELOG) corrective regimen sliding scale   SubCutaneous at bedtime    MEDICATIONS  (PRN):  acetaminophen     Tablet .. 650 milliGRAM(s) Oral every 6 hours PRN Temp greater or equal to 38C (100.4F), Mild Pain (1 - 3)  dextrose Oral Gel 15 Gram(s) Oral once PRN Blood Glucose LESS THAN 70 milliGRAM(s)/deciliter  heparin   Injectable 9000 Unit(s) IV Push every 6 hours PRN For aPTT less than 40  heparin   Injectable 4500 Unit(s) IV Push every 6 hours PRN For aPTT between 40 - 57          T(C): 36.8 (02-27 @ 04:42), Max: 37.1 (02-26 @ 20:40)   HR: 68   BP: 104/65   RR: 18   SpO2: 99%    PHYSICAL EXAM:    CONSTITUTIONAL: NAD, well-developed, well-groomed  EYES: PERRLA; conjunctiva and sclera clear  ENMT: Moist oral mucosa, no pharyngeal injection or exudates; normal dentition  NECK: Supple, no palpable masses; no thyromegaly  RESPIRATORY: Normal respiratory effort; lungs are clear to auscultation bilaterally  CARDIOVASCULAR: Regular rate and rhythm, normal S1 and S2, no murmur/rub/gallop; No lower extremity edema; Peripheral pulses are 2+ bilaterally  ABDOMEN: Nontender to palpation, normoactive bowel sounds, no rebound/guarding; No hepatosplenomegaly  MUSCULOSKELETAL:  no clubbing or cyanosis of digits; no joint swelling or tenderness to palpation  PSYCH: A+O to person, place, and time; affect appropriate  NEUROLOGY: CN 2-12 are intact and symmetric; no gross sensory deficits   SKIN: No rashes; no palpable lesions      LABS:                        11.7   8.26  )-----------( 196      ( 27 Feb 2024 06:21 )             35.6      02-27    138  |  101  |  35<H>  ----------------------------<  114<H>  3.8   |  26  |  1.40<H>    Ca    9.7      27 Feb 2024 06:21    TPro  6.9  /  Alb  3.6  /  TBili  0.5  /  DBili  x   /  AST  22  /  ALT  18  /  AlkPhos  78  02-27       CAPILLARY BLOOD GLUCOSE      POCT Blood Glucose.: 117 mg/dL (27 Feb 2024 07:36)  POCT Blood Glucose.: 115 mg/dL (26 Feb 2024 21:04)  POCT Blood Glucose.: 129 mg/dL (26 Feb 2024 17:05)  POCT Blood Glucose.: 115 mg/dL (26 Feb 2024 11:30)      RADIOLOGY & ADDITIONAL TESTS:    Imaging Personally Reviewed:  Consultant(s) Notes Reviewed:    Care Discussed with Consultants/Other Providers:

## 2024-02-27 NOTE — PROGRESS NOTE ADULT - PROBLEM SELECTOR PLAN 1
Presented with RLE swelling and pain for about 1 month sent in after outpt LE duplex  with RLE DVT. Patient also has h/o RA and h/o pulm HTN as overlying factors. 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 Duplex R ext iliac vein clot extending to calf, thrombus in right common fem which is mobile. MR abdominal venogram. Acute deep venous thrombosis involving the distal right external iliac vein and right common femoral vein.    - Vascular cards following --> I spoke with Dr. Chahal. he is planning for thrombectomy today.  - c/w hep gtt. eventual transition to oral AC
Presented with RLE swelling and pain for about 1 month sent in after outpt LE duplex  with RLE DVT. Patient also has h/o RA and h/o pulm HTN as overlying factors. 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 Duplex R ext iliac vein clot extending to calf, thrombus in right common fem which is mobile. MR abdominal venogram. Acute deep venous thrombosis involving the distal right external iliac vein and right common femoral vein.    - Vascular cards following --> ?plans for thrombectomy  - c/w hep gtt. eventual transition to oral AC
Presented with RLE swelling and pain for about 1 month sent in after outpt LE duplex  with RLE DVT. Patient also has h/o RA and h/o pulm HTN as overlying factors. 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 Duplex R ext iliac vein clot extending to calf, thrombus in right common fem which is mobile.   Seen by vasc sx recommending CT venogram RLE to eval for extension into pelvis , possible enrollment into trial. MR abdomen and pelvis with right common femoral and distal right external iliac vein involvement of clot.  - Follow up ongoing vascular cardiology and cardiology recommendations  - Started on hep gtt for now. eventual transition to oral AC
Presented with RLE swelling and pain for about 1 month sent in after outpt LE duplex  with RLE DVT. Patient also has h/o RA and h/o pulm HTN as overlying factors. 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 Duplex R ext iliac vein clot extending to calf, thrombus in right common fem which is mobile.   Seen by vasc sx recommending CT venogram RLE to eval for extension into pelvis , possible enrollment into trial  - Follow up ongoing vascular cardiology and cardiology recommendations  - Started on hep gtt for now. eventual transition to oral   - MR venogram ordered and pending

## 2024-02-27 NOTE — PROGRESS NOTE ADULT - PROBLEM SELECTOR PLAN 7
Follow outpt with Dr. Scott  h/o pulm HTN.   CPAP usage at home .

## 2024-02-27 NOTE — PROGRESS NOTE ADULT - PROBLEM SELECTOR PLAN 3
Baseline Cr around 0.9 to 1.0 . admission Cr 1.34   Has been on higher dose lasix 40mg BID.   - Will repeat lab now and monitor renal function.   - Hold lasix for now until stable Cr s/p CTA but also plans for additional CT venogram to eval extension.   - Monitor I+Os. lytes  - Creatinine Trend: 1.40<--, 1.43<--, 1.22<--, 1.34<--
Baseline Cr around 0.9 to 1.0 . admission Cr 1.34   Has been on higher dose lasix 40mg BID.   - Will repeat lab now and monitor renal function.   - Hold lasix for now until stable Cr s/p CTA but also plans for additional CT venogram to eval extension.   - Monitor I+Os. lytes
Baseline Cr around 0.9 to 1.0 . admission Cr 1.34   Has been on higher dose lasix 40mg BID.   - Will repeat lab now and monitor renal function.   - Hold lasix for now until stable Cr s/p CTA but also plans for additional CT venogram to eval extension.   - Monitor I+Os. lytes
Baseline Cr around 0.9 to 1.0 . admission Cr 1.34 .  Has been on higher dose lasix 40mg BID.   - Will repeat lab now and monitor renal function.   - Hold lasix for now until stable Cr s/p CTA but also plans for additional CT venogram to eval extension.   - Monitor I+Os. lytes  - Cr is stable. Creatinine Trend: 1.40<--, 1.40<--, 1.43<--, 1.22<--, 1.34<--

## 2024-02-27 NOTE — PROGRESS NOTE ADULT - PROBLEM/PLAN-3
Concern    Not on file   Social History Narrative    Not on file     Social Determinants of Health     Financial Resource Strain:     Difficulty of Paying Living Expenses:    Food Insecurity:     Worried About Running Out of Food in the Last Year:     [address] in the Last Year:    Transportation Needs:     Lack of Transportation (Medical):  Lack of Transportation (Non-Medical):    Physical Activity:     Days of Exercise per Week:     Minutes of Exercise per Session:    Stress:     Feeling of Stress :    Social Connections:     Frequency of Communication with Friends and Family:     Frequency of Social Gatherings with Friends and Family:     Attends [employer] Services:     Active Member of Clubs or Organizations:     Attends Club or Organization Meetings:     Marital Status:    Intimate Partner Violence:     Fear of Current or Ex-Partner:     Emotionally Abused:     Physically Abused:     Sexually Abused:      Current Facility-Administered Medications   Medication Dose Route Frequency Provider Last Rate Last Admin    LORazepam (ATIVAN) tablet 1 mg  1 mg Oral Once PRN Rebecca Saleh MD         Current Outpatient Medications   Medication Sig Dispense Refill    busPIRone (BUSPAR) 10 MG tablet Take 10 mg by mouth 2 times daily      dorzolamide-timolol (COSOPT) 22.3-6.8 MG/ML ophthalmic solution 1 drop 2 times daily      DULoxetine (CYMBALTA) 30 MG extended release capsule Take 90 mg by mouth daily Alternating 60 mg and 90 mg qod      gabapentin (NEURONTIN) 300 MG capsule Take by mouth 3 times daily.        losartan-hydroCHLOROthiazide (HYZAAR) 100-12.5 MG per tablet Take 1 tablet by mouth daily      metFORMIN (GLUCOPHAGE) 500 MG tablet 500 mg 2 times daily (with meals)       Multiple Vitamin (MULTIVITAMIN) tablet Take 1 tablet by mouth daily       Allergies   Allergen Reactions    Lodine [Etodolac] Itching       REVIEW OF SYSTEMS  10 systems reviewed, pertinent positives per HPI
DISPLAY PLAN FREE TEXT
otherwise noted to be negative. PHYSICAL EXAM  BP (!) 138/111   Pulse 85   Temp 98.2 °F (36.8 °C) (Oral)   Resp 18   Ht 5' 6\" (1.676 m)   Wt 246 lb (111.6 kg)   SpO2 98%   BMI 39.71 kg/m²    GENERAL APPEARANCE: Awake and alert. Tearful. HENT: Normocephalic. Atraumatic. PERRL. EOMI. No facial droop. HEART/CHEST: RRR. LUNGS: Respirations unlabored. Speaking comfortably in full sentences. ABDOMEN: Soft, non-distended abdomen. Non tender to palpation. No guarding. No rebound. EXTREMITIES: no gross deformities. Moving all extremities. SKIN: Warm and dry. No acute rashes. NEUROLOGICAL: Alert and oriented. No gross facial drooping. Answering questions appropriately. Moving all extremities. PSYCHIATRIC: Pleasant. Anxious. LABS  Results for orders placed or performed during the hospital encounter of 09/26/21   EKG 12 Lead   Result Value Ref Range    Ventricular Rate 81 BPM    Atrial Rate 81 BPM    P-R Interval 128 ms    QRS Duration 80 ms    Q-T Interval 418 ms    QTc Calculation (Bazett) 485 ms    P Axis 24 degrees    R Axis 37 degrees    T Axis 28 degrees    Diagnosis       Normal sinus rhythmProlonged QTAbnormal ECGWhen compared with ECG of 12-OCT-2008 01:13,No significant change was found       I have reviewed all labs for this visit. ECG  The Ekg interpreted by me shows  Normal sinus rhythm with a rate of 81  Axis is normal  QTc is prolonged  Intervals and Durations are unremarkable. ST Segments: Nonspecific changes     RADIOLOGY  [facility] WILVER DIGITAL SCREEN BILATERAL    Result Date: 9/2/2021  EXAMINATION: SCREENING DIGITAL BILATERAL  MAMMOGRAM WITH TOMOSYNTHESIS 9/2/2021 TECHNIQUE: Screening mammography was performed with tomosynthesis including MLO and CC views of the bilateral breasts. Computer aided detection was used for the interpretation of this exam. COMPARISON: 2020 HISTORY: Screening. FINDINGS: There are scattered areas of fibroglandular density.  There is no new dominant mass,
suspicious microcalcification, or area of architectural distortion. Stable benign circumscribed superficial inferior right breast mass again noted. No mammographic evidence of malignancy. BIRADS: BIRADS - CATEGORY 1 Negative, no evidence of malignancy. Normal interval follow-up is recommended in 12 months. OVERALL ASSESSMENT - NEGATIVE A letter of notification will be sent to the patient regarding the results. The [facility] of Radiology recommends annual mammograms for women 40 years and older. ED COURSE/MDM  Patient seen and evaluated. At presentation, patient was awake, alert, afebrile, hemodynamically stable, and satting well on room air. Patient denies having SI/HI/hallucainations. Patient has a lot of stressors in life that has made her anxiety worse. However, reports she has been able to care for herself and family. Patient does not meet criteria for a 72 hold. As hypoglycemia/hyperglycemia, electrolyte abnormalities, anemia, could cause nonspecific symptoms such as hers, will obtain basic labs. Patient provided with referral for psychiatry. Discussed returning to ED if she develops thoughts of suicide, having difficulty caring for herself or others, cannot get in to psychiatry or any concerns. She was agreeable with plan. Creatinine normal. glc borderline high at 148. Troponin negative. Hemoglobin normal at 14.8. These were discussed with plan. Patient denies having any concerns about discharge home. She was discharged home with strict return precautions. Pt was seen during the [place] 19 pandemic. Appropriate PPE worn by ME during patient encounters. Pt seen during a time with constrained hospital bed capacity and other potential inpatient and outpatient resources were constrained due to the viral pandemic. During the patient's ED course, the patient was given:  Medications   LORazepam (ATIVAN) tablet 1 mg (has no administration in time range)        CLINICAL IMPRESSION  1.  Anxiety
DISPLAY PLAN FREE TEXT

## 2024-02-27 NOTE — PROGRESS NOTE ADULT - PROBLEM SELECTOR PLAN 4
On home norvasc 10mg, lasix 40mg BID, spironolactone 25mg    - Continue with home amlodipine   - currently on lasix bid --> decrease per cardiology
On home norvasc 10mg, lasix 40mg BID, spironolactone 25mg    - Continue with home amlodipine   - hold diuretic in setting of contrast study and slight inc in Cr

## 2024-02-27 NOTE — PROGRESS NOTE ADULT - PROBLEM SELECTOR PLAN 2
Outpt increased dose of lasix from 40mg daily to BID for LE edema. has been improved though now more asymmetrical likely in setting of RLE DVT. Last echo from 5/2023 overall preserved LV function (both syst and diast) with mild mod valvular dz.  Will hold lasix 40mg BID for now given CKD and contrast load received for CTA
Outpt increased dose of lasix from 40mg daily to BID for LE edema. has been improved though now more asymmetrical likely in setting of RLE DVT. Last echo from 5/2023 overall preserved LV function (both syst and diast) with mild mod valvular dz.  Will hold lasix 40mg BID for now given CKD and contrast load received for CTA
Outpt increased dose of lasix from 40mg daily to BID for LE edema. has been improved though now more asymmetrical likely in setting of RLE DVT. Last echo from 5/2023 overall preserved LV function (both syst and diast) with mild mod valvular dz.  pt is getting lasix 40mg BID cards following --> ? need to decrease to once daily.
Outpt increased dose of lasix from 40mg daily to BID for LE edema. has been improved though now more asymmetrical likely in setting of RLE DVT. Last echo from 5/2023 overall preserved LV function (both syst and diast) with mild mod valvular dz.  Will hold lasix 40mg BID for now given CKD and contrast load received for CTA

## 2024-02-28 ENCOUNTER — TRANSCRIPTION ENCOUNTER (OUTPATIENT)
Age: 74
End: 2024-02-28

## 2024-02-28 VITALS
RESPIRATION RATE: 18 BRPM | SYSTOLIC BLOOD PRESSURE: 137 MMHG | HEART RATE: 90 BPM | OXYGEN SATURATION: 95 % | TEMPERATURE: 98 F | DIASTOLIC BLOOD PRESSURE: 66 MMHG

## 2024-02-28 PROBLEM — E78.5 HYPERLIPIDEMIA, UNSPECIFIED: Chronic | Status: ACTIVE | Noted: 2024-02-23

## 2024-02-28 PROBLEM — M06.9 RHEUMATOID ARTHRITIS, UNSPECIFIED: Chronic | Status: ACTIVE | Noted: 2024-02-23

## 2024-02-28 PROBLEM — I50.9 HEART FAILURE, UNSPECIFIED: Chronic | Status: ACTIVE | Noted: 2024-02-23

## 2024-02-28 PROBLEM — G47.33 OBSTRUCTIVE SLEEP APNEA (ADULT) (PEDIATRIC): Chronic | Status: ACTIVE | Noted: 2024-02-23

## 2024-02-28 PROBLEM — I27.20 PULMONARY HYPERTENSION, UNSPECIFIED: Chronic | Status: ACTIVE | Noted: 2024-02-23

## 2024-02-28 LAB
ANION GAP SERPL CALC-SCNC: 15 MMOL/L — SIGNIFICANT CHANGE UP (ref 5–17)
APTT BLD: 119 SEC — HIGH (ref 24.5–35.6)
APTT BLD: 79.5 SEC — HIGH (ref 24.5–35.6)
APTT BLD: 79.6 SEC — HIGH (ref 24.5–35.6)
BUN SERPL-MCNC: 30 MG/DL — HIGH (ref 7–23)
CALCIUM SERPL-MCNC: 10.2 MG/DL — SIGNIFICANT CHANGE UP (ref 8.4–10.5)
CHLORIDE SERPL-SCNC: 101 MMOL/L — SIGNIFICANT CHANGE UP (ref 96–108)
CO2 SERPL-SCNC: 22 MMOL/L — SIGNIFICANT CHANGE UP (ref 22–31)
CREAT SERPL-MCNC: 1.37 MG/DL — HIGH (ref 0.5–1.3)
EGFR: 40 ML/MIN/1.73M2 — LOW
GLUCOSE BLDC GLUCOMTR-MCNC: 107 MG/DL — HIGH (ref 70–99)
GLUCOSE BLDC GLUCOMTR-MCNC: 117 MG/DL — HIGH (ref 70–99)
GLUCOSE BLDC GLUCOMTR-MCNC: 117 MG/DL — HIGH (ref 70–99)
GLUCOSE SERPL-MCNC: 113 MG/DL — HIGH (ref 70–99)
HCT VFR BLD CALC: 36.8 % — SIGNIFICANT CHANGE UP (ref 34.5–45)
HCT VFR BLD CALC: 39.6 % — SIGNIFICANT CHANGE UP (ref 34.5–45)
HGB BLD-MCNC: 12.1 G/DL — SIGNIFICANT CHANGE UP (ref 11.5–15.5)
HGB BLD-MCNC: 13.1 G/DL — SIGNIFICANT CHANGE UP (ref 11.5–15.5)
MCHC RBC-ENTMCNC: 30.3 PG — SIGNIFICANT CHANGE UP (ref 27–34)
MCHC RBC-ENTMCNC: 30.7 PG — SIGNIFICANT CHANGE UP (ref 27–34)
MCHC RBC-ENTMCNC: 32.9 GM/DL — SIGNIFICANT CHANGE UP (ref 32–36)
MCHC RBC-ENTMCNC: 33.1 GM/DL — SIGNIFICANT CHANGE UP (ref 32–36)
MCV RBC AUTO: 92.2 FL — SIGNIFICANT CHANGE UP (ref 80–100)
MCV RBC AUTO: 92.7 FL — SIGNIFICANT CHANGE UP (ref 80–100)
NRBC # BLD: 0 /100 WBCS — SIGNIFICANT CHANGE UP (ref 0–0)
NRBC # BLD: 0 /100 WBCS — SIGNIFICANT CHANGE UP (ref 0–0)
PLATELET # BLD AUTO: 211 K/UL — SIGNIFICANT CHANGE UP (ref 150–400)
PLATELET # BLD AUTO: 218 K/UL — SIGNIFICANT CHANGE UP (ref 150–400)
POTASSIUM SERPL-MCNC: 3.6 MMOL/L — SIGNIFICANT CHANGE UP (ref 3.5–5.3)
POTASSIUM SERPL-SCNC: 3.6 MMOL/L — SIGNIFICANT CHANGE UP (ref 3.5–5.3)
RBC # BLD: 3.99 M/UL — SIGNIFICANT CHANGE UP (ref 3.8–5.2)
RBC # BLD: 4.27 M/UL — SIGNIFICANT CHANGE UP (ref 3.8–5.2)
RBC # FLD: 13.2 % — SIGNIFICANT CHANGE UP (ref 10.3–14.5)
RBC # FLD: 13.4 % — SIGNIFICANT CHANGE UP (ref 10.3–14.5)
SODIUM SERPL-SCNC: 138 MMOL/L — SIGNIFICANT CHANGE UP (ref 135–145)
WBC # BLD: 9.31 K/UL — SIGNIFICANT CHANGE UP (ref 3.8–10.5)
WBC # BLD: 9.68 K/UL — SIGNIFICANT CHANGE UP (ref 3.8–10.5)
WBC # FLD AUTO: 9.31 K/UL — SIGNIFICANT CHANGE UP (ref 3.8–10.5)
WBC # FLD AUTO: 9.68 K/UL — SIGNIFICANT CHANGE UP (ref 3.8–10.5)

## 2024-02-28 PROCEDURE — 85730 THROMBOPLASTIN TIME PARTIAL: CPT

## 2024-02-28 PROCEDURE — 75825 VEIN X-RAY TRUNK: CPT | Mod: XU

## 2024-02-28 PROCEDURE — C1725: CPT

## 2024-02-28 PROCEDURE — 99285 EMERGENCY DEPT VISIT HI MDM: CPT

## 2024-02-28 PROCEDURE — 80048 BASIC METABOLIC PNL TOTAL CA: CPT

## 2024-02-28 PROCEDURE — 37252 INTRVASC US NONCORONARY 1ST: CPT

## 2024-02-28 PROCEDURE — C1887: CPT

## 2024-02-28 PROCEDURE — 86901 BLOOD TYPING SEROLOGIC RH(D): CPT

## 2024-02-28 PROCEDURE — C1769: CPT

## 2024-02-28 PROCEDURE — 97161 PT EVAL LOW COMPLEX 20 MIN: CPT

## 2024-02-28 PROCEDURE — C1757: CPT

## 2024-02-28 PROCEDURE — 87640 STAPH A DNA AMP PROBE: CPT

## 2024-02-28 PROCEDURE — C1753: CPT

## 2024-02-28 PROCEDURE — 37248 TRLUML BALO ANGIOP 1ST VEIN: CPT

## 2024-02-28 PROCEDURE — 75820 VEIN X-RAY ARM/LEG: CPT | Mod: XU

## 2024-02-28 PROCEDURE — 83036 HEMOGLOBIN GLYCOSYLATED A1C: CPT

## 2024-02-28 PROCEDURE — C1894: CPT

## 2024-02-28 PROCEDURE — 99239 HOSP IP/OBS DSCHRG MGMT >30: CPT

## 2024-02-28 PROCEDURE — 99233 SBSQ HOSP IP/OBS HIGH 50: CPT

## 2024-02-28 PROCEDURE — 85027 COMPLETE CBC AUTOMATED: CPT

## 2024-02-28 PROCEDURE — 83735 ASSAY OF MAGNESIUM: CPT

## 2024-02-28 PROCEDURE — 80053 COMPREHEN METABOLIC PANEL: CPT

## 2024-02-28 PROCEDURE — 85025 COMPLETE CBC W/AUTO DIFF WBC: CPT

## 2024-02-28 PROCEDURE — C8900: CPT | Mod: MC

## 2024-02-28 PROCEDURE — C8918: CPT | Mod: MC

## 2024-02-28 PROCEDURE — 37187 VENOUS MECH THROMBECTOMY: CPT

## 2024-02-28 PROCEDURE — 85610 PROTHROMBIN TIME: CPT

## 2024-02-28 PROCEDURE — 36010 PLACE CATHETER IN VEIN: CPT

## 2024-02-28 PROCEDURE — 36415 COLL VENOUS BLD VENIPUNCTURE: CPT

## 2024-02-28 PROCEDURE — 93306 TTE W/DOPPLER COMPLETE: CPT

## 2024-02-28 PROCEDURE — 86803 HEPATITIS C AB TEST: CPT

## 2024-02-28 PROCEDURE — 82962 GLUCOSE BLOOD TEST: CPT

## 2024-02-28 PROCEDURE — A9585: CPT

## 2024-02-28 PROCEDURE — 71275 CT ANGIOGRAPHY CHEST: CPT | Mod: MC

## 2024-02-28 PROCEDURE — 86850 RBC ANTIBODY SCREEN: CPT

## 2024-02-28 PROCEDURE — 86900 BLOOD TYPING SEROLOGIC ABO: CPT

## 2024-02-28 PROCEDURE — 87641 MR-STAPH DNA AMP PROBE: CPT

## 2024-02-28 RX ORDER — APIXABAN 2.5 MG/1
10 TABLET, FILM COATED ORAL EVERY 12 HOURS
Refills: 0 | Status: DISCONTINUED | OUTPATIENT
Start: 2024-02-28 | End: 2024-02-28

## 2024-02-28 RX ORDER — APIXABAN 2.5 MG/1
1 TABLET, FILM COATED ORAL
Qty: 60 | Refills: 0
Start: 2024-02-28 | End: 2024-03-28

## 2024-02-28 RX ORDER — ASPIRIN/CALCIUM CARB/MAGNESIUM 324 MG
81 TABLET ORAL DAILY
Refills: 0 | Status: DISCONTINUED | OUTPATIENT
Start: 2024-02-28 | End: 2024-02-28

## 2024-02-28 RX ORDER — ASPIRIN/CALCIUM CARB/MAGNESIUM 324 MG
1 TABLET ORAL
Qty: 90 | Refills: 0
Start: 2024-02-28 | End: 2024-05-27

## 2024-02-28 RX ADMIN — Medication 650 MILLIGRAM(S): at 01:56

## 2024-02-28 RX ADMIN — HEPARIN SODIUM 1000 UNIT(S)/HR: 5000 INJECTION INTRAVENOUS; SUBCUTANEOUS at 01:15

## 2024-02-28 RX ADMIN — HEPARIN SODIUM 800 UNIT(S)/HR: 5000 INJECTION INTRAVENOUS; SUBCUTANEOUS at 02:54

## 2024-02-28 RX ADMIN — Medication 650 MILLIGRAM(S): at 16:38

## 2024-02-28 RX ADMIN — Medication 100 MILLIGRAM(S): at 11:18

## 2024-02-28 RX ADMIN — HEPARIN SODIUM 800 UNIT(S)/HR: 5000 INJECTION INTRAVENOUS; SUBCUTANEOUS at 09:45

## 2024-02-28 RX ADMIN — APIXABAN 10 MILLIGRAM(S): 2.5 TABLET, FILM COATED ORAL at 10:45

## 2024-02-28 RX ADMIN — ATORVASTATIN CALCIUM 10 MILLIGRAM(S): 80 TABLET, FILM COATED ORAL at 01:32

## 2024-02-28 RX ADMIN — Medication 650 MILLIGRAM(S): at 02:58

## 2024-02-28 RX ADMIN — CHLORHEXIDINE GLUCONATE 1 APPLICATION(S): 213 SOLUTION TOPICAL at 11:18

## 2024-02-28 RX ADMIN — AMLODIPINE BESYLATE 10 MILLIGRAM(S): 2.5 TABLET ORAL at 05:05

## 2024-02-28 RX ADMIN — Medication 650 MILLIGRAM(S): at 17:30

## 2024-02-28 RX ADMIN — Medication 81 MILLIGRAM(S): at 11:18

## 2024-02-28 RX ADMIN — Medication 40 MILLIGRAM(S): at 05:05

## 2024-02-28 RX ADMIN — Medication 40 MILLIGRAM(S): at 13:18

## 2024-02-28 RX ADMIN — HEPARIN SODIUM 800 UNIT(S)/HR: 5000 INJECTION INTRAVENOUS; SUBCUTANEOUS at 07:08

## 2024-02-28 RX ADMIN — Medication 200 MILLIGRAM(S): at 11:18

## 2024-02-28 NOTE — DISCHARGE NOTE PROVIDER - NSDCFUSCHEDAPPT_GEN_ALL_CORE_FT
Noe Grande  [facility]  VASCULAR [address]  Scheduled Appointment: 03/13/2024    Noe Corley  [facility]  CARDIOLOGY [address]   Scheduled Appointment: 03/22/2024     Irma Lorenzo  [facility]  INTMED [address]  Scheduled Appointment: 03/12/2024    Noe Grande  [facility]  VASCULAR [address]  Scheduled Appointment: 03/13/2024    Noe Corley  [facility]  CARDIOLOGY [address]   Scheduled Appointment: 03/22/2024

## 2024-02-28 NOTE — CHART NOTE - NSCHARTNOTEFT_GEN_A_CORE
Rolling Walker:  Patient requires rolling walker due to recent left lower extremity venoplasty due to DVT with need for assist to decrease weight bearing on the LLE for safe ambulation at discharge. Rolling Walker:  Patient requires rolling walker due to recent Right lower extremity thrombectomy and venoplasty due to DVT with need for assist to decrease weight bearing on the LLE for safe ambulation at discharge.

## 2024-02-28 NOTE — PROGRESS NOTE ADULT - SUBJECTIVE AND OBJECTIVE BOX
Patient seen and examined at bedside.    Overnight Events: No acute events overnight. Denies chest pain or SOB      REVIEW OF SYSTEMS:  Constitutional:     [ x] negative [ ] fevers [ ] chills [ ] weight loss [ ] weight gain  HEENT:                  [ x] negative [ ] dry eyes [ ] eye irritation [ ] postnasal drip [ ] nasal congestion  CV:                         [x ] negative  [ ] chest pain [ ] orthopnea [ ] palpitations [ ] murmur  Resp:                     [ x] negative [ ] cough [ ] shortness of breath [ ] dyspnea [ ] wheezing [ ] sputum [ ]hemoptysis  GI:                          [ x] negative [ ] nausea [ ] vomiting [ ] diarrhea [ ] constipation [ ] abd pain [ ] dysphagia   :                        [ x] negative [ ] dysuria [ ] nocturia [ ] hematuria [ ] increased urinary frequency  Musculoskeletal: [ x] negative [ ] back pain [ ] myalgias [ ] arthralgias [ ] fracture  Skin:                       [ x] negative [ ] rash [ ] itch  Neurological:        [ x] negative [ ] headache [ ] dizziness [ ] syncope [ ] weakness [ ] numbness  Psychiatric:           [ x] negative [ ] anxiety [ ] depression  Endocrine:            [ x] negative [ ] diabetes [ ] thyroid problem  Heme/Lymph:      [ x] negative [ ] anemia [ ] bleeding problem  Allergic/Immune: [x ] negative [ ] itchy eyes [ ] nasal discharge [ ] hives [ ] angioedema    [x ] All other systems negative  [ ] Unable to assess ROS due to    Current Meds:  acetaminophen     Tablet .. 650 milliGRAM(s) Oral every 6 hours PRN  allopurinol 100 milliGRAM(s) Oral daily  amLODIPine   Tablet 10 milliGRAM(s) Oral daily  atorvastatin 10 milliGRAM(s) Oral at bedtime  chlorhexidine 2% Cloths 1 Application(s) Topical daily  dextrose 5%. 1000 milliLiter(s) IV Continuous <Continuous>  dextrose 5%. 1000 milliLiter(s) IV Continuous <Continuous>  dextrose 50% Injectable 12.5 Gram(s) IV Push once  dextrose 50% Injectable 25 Gram(s) IV Push once  dextrose 50% Injectable 25 Gram(s) IV Push once  dextrose Oral Gel 15 Gram(s) Oral once PRN  furosemide    Tablet 40 milliGRAM(s) Oral two times a day  glucagon  Injectable 1 milliGRAM(s) IntraMuscular once  heparin   Injectable 9000 Unit(s) IV Push every 6 hours PRN  heparin   Injectable 4500 Unit(s) IV Push every 6 hours PRN  heparin  Infusion. 1000 Unit(s)/Hr IV Continuous <Continuous>  hydroxychloroquine 200 milliGRAM(s) Oral daily  insulin lispro (ADMELOG) corrective regimen sliding scale   SubCutaneous three times a day before meals  insulin lispro (ADMELOG) corrective regimen sliding scale   SubCutaneous at bedtime  sodium chloride 0.9%. 250 milliLiter(s) IV Continuous <Continuous>  sodium chloride 0.9%. 1000 milliLiter(s) IV Continuous <Continuous>      PAST MEDICAL & SURGICAL HISTORY:  HTN (hypertension)      CKD (chronic kidney disease)      Pulmonary hypertension      HLD (hyperlipidemia)      RA (rheumatoid arthritis)      LUCIANO (obstructive sleep apnea)      Chronic CHF      S/P hysterectomy      S/P hernia surgery          Vitals:  T(F): 98.4 (02-28), Max: 98.8 (02-27)  HR: 78 (02-28) (70 - 99)  BP: 117/71 (02-28) (101/63 - 150/68)  RR: 18 (02-28)  SpO2: 97% (02-28)  I&O's Summary    27 Feb 2024 07:01  -  28 Feb 2024 07:00  --------------------------------------------------------  IN: 220 mL / OUT: 0 mL / NET: 220 mL        Physical Exam:  Appearance: No acute distress; well appearing  Eyes: PERRL, EOMI, pink conjunctiva  HENT: Normal oral mucosa  Cardiovascular: RRR, S1, S2, no murmurs, rubs, or gallops; no edema; no JVD  Respiratory: Clear to auscultation bilaterally  Gastrointestinal: soft, non-tender, non-distended with normal bowel sounds  Musculoskeletal: No clubbing; no joint deformity   Neurologic: Non-focal  Lymphatic: No lymphadenopathy  Psychiatry: AAOx3, mood & affect appropriate  Skin: No rashes, ecchymoses, or cyanosis                          13.1   9.31  )-----------( 218      ( 28 Feb 2024 06:21 )             39.6     02-28    138  |  101  |  30<H>  ----------------------------<  113<H>  3.6   |  22  |  1.37<H>    Ca    10.2      28 Feb 2024 06:21    TPro  6.9  /  Alb  3.6  /  TBili  0.5  /  DBili  x   /  AST  22  /  ALT  18  /  AlkPhos  78  02-27    PT/INR - ( 27 Feb 2024 06:21 )   PT: 12.9 sec;   INR: 1.24 ratio         PTT - ( 28 Feb 2024 09:13 )  PTT:79.5 sec

## 2024-02-28 NOTE — PROGRESS NOTE ADULT - SUBJECTIVE AND OBJECTIVE BOX
DATE OF SERVICE: 02-28-24 @ 13:27    Patient is a 73y old  Female who presents with a chief complaint of RLE pain swelling (28 Feb 2024 11:49)      INTERVAL HISTORY: feels well    REVIEW OF SYSTEMS:  CONSTITUTIONAL: No weakness  EYES/ENT: No visual changes;  No throat pain   NECK: No pain or stiffness  RESPIRATORY: No cough, wheezing; No shortness of breath  CARDIOVASCULAR: No chest pain or palpitations  GASTROINTESTINAL: No abdominal  pain. No nausea, vomiting, or hematemesis  GENITOURINARY: No dysuria, frequency or hematuria  NEUROLOGICAL: No stroke like symptoms  SKIN: No rashes      	  MEDICATIONS:  amLODIPine   Tablet 10 milliGRAM(s) Oral daily  furosemide    Tablet 40 milliGRAM(s) Oral two times a day        PHYSICAL EXAM:  T(C): 36.6 (02-28-24 @ 11:12), Max: 36.9 (02-28-24 @ 04:23)  HR: 83 (02-28-24 @ 13:19) (73 - 99)  BP: 120/72 (02-28-24 @ 13:19) (101/63 - 150/68)  RR: 18 (02-28-24 @ 11:12) (18 - 18)  SpO2: 97% (02-28-24 @ 11:12) (92% - 100%)  Wt(kg): --  I&O's Summary    27 Feb 2024 07:01  -  28 Feb 2024 07:00  --------------------------------------------------------  IN: 220 mL / OUT: 0 mL / NET: 220 mL          Appearance: In no distress	  HEENT:    PERRL, EOMI	  Cardiovascular:  S1 S2, No JVD  Respiratory: Lungs clear to auscultation	  Gastrointestinal:  Soft, Non-tender, + BS	  Vascularature:  No edema of LE  Psychiatric: Appropriate affect   Neuro: no acute focal deficits                               13.1   9.31  )-----------( 218      ( 28 Feb 2024 06:21 )             39.6     02-28    138  |  101  |  30<H>  ----------------------------<  113<H>  3.6   |  22  |  1.37<H>    Ca    10.2      28 Feb 2024 06:21    TPro  6.9  /  Alb  3.6  /  TBili  0.5  /  DBili  x   /  AST  22  /  ALT  18  /  AlkPhos  78  02-27        Labs personally reviewed      ASSESSMENT/PLAN: 	  74 yo f with h/o pHTN, HTN, HLD, CKD3, RA, LUCIANO, ?CHF presented with RLE swelling and pain for 1 month found to have RLE extensive DVT on outpt study, Patient has had BILATERAL le swelling for about 1 month and has been treated with increased dose of lasix with Dr. Corley/Dr. Lorenzo.     1. DVT/PE  - US RLE + DVT  - CTA lung "chronic" segmental RML PE with evidence of infarct   - Vascular surgery and vascular cardiology consulted   - c/w heparin gtt,  Transition to oral DOAC on dc   - Vascular cards and vascular surg FU  - MR venogram done - DVT of L EIA and common femoral, no proximal extension into IVC  - s/p thrombectomy 2/27  - Transitioned to Eliquis 10mg BID x 7 days then to 5mg BID    2. HFpEF  - Pt follows w/ Dr. Corley as primary cardiologist.  Currently taking Aldactone 25mg qd and Lasix 40 mg qd  - Outpt increased dose of lasix from 40mg daily to BID for LE edema.   - TTE 5/2023-  EF 60-66% The left ventricular wall thickness is mildly increased. mild to mod AR.  Estimated pulmonary artery systolic pressure is 43 mmHg, consistent with mild pulmonary   - appears euvolemic   - await new TTE  - TTE 2/23 hyperdynamic EF, no intracardiac shunt   - Lasix 40mg PO BID, will trend Cr and consider decreasing to Lasix 40mg PO daily    3. HTN  - c/w home medications    4.HLD  - c/w atorvastatin 10mg qd    5. CKD  - mon renal function                 Iolani Behrbom, AG-ISH Landeros DO [facility]  Cardiovascular Medicine  [address], [address]  Available through call or text on [employer] TEAMs  Office: [phone]

## 2024-02-28 NOTE — DISCHARGE NOTE NURSING/CASE MANAGEMENT/SOCIAL WORK - PATIENT PORTAL LINK FT
You can access the FollowMyHealth Patient Portal offered by [facility] by registering at the following website: [email or web address]. By joining [facility]’s FollowMyHealth portal, you will also be able to view your health information using other applications (apps) compatible with our system.

## 2024-02-28 NOTE — DISCHARGE NOTE PROVIDER - CARE PROVIDER_API CALL
Torsten Chahal  Interventional Cardiology  [address] [place] [address], [address] [place] [id number]  Phone: [phone]  Fax: [phone]  Follow Up Time: 1 month   Torsten Chahal  Interventional Cardiology  [address] [place] [address], [address] [place] [id number]  Phone: [phone]  Fax: [phone]  Follow Up Time: 2 weeks

## 2024-02-28 NOTE — PROGRESS NOTE ADULT - ASSESSMENT
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     Vascular cardiology consulted for further management. Pt notes RLE > LLE swelling but denies significant pain or SOB/dyspnea. Has been pt of Dr. Corley's for many years, was told ~December she has pHTN by pulmonologist who she's seen since dx of pneumonia several years ago. No prior h/o DVT/PE, FHx of bleeding/clotting dz, remote surgeries (hysterectomy, umbilical hernia surgery), no recent immobilization or prolonged travel. No known h/o cancer and has been uptodate and unremarkable with regards to cancer screening (colonoscopy, mammo, pap smears).      Found to have chronic thromboembolic lateral segment right middle lobe pulmonary arteries, with associated right middle lobe pulmonary infarct as well as DVTs in distal right external iliac, common femoral, femoral, popliteal, posterior tibial, and peroneal veins; CFV DVT with mobile appearance. MR venogram 2/24 w/ DVT of L EIA and common femoral, no proximal extension into IVC. Pt overall hemodynamically stable.     Hobbs Score ~7-8 to stratify as mild Post-thrombotic Syndrome (+intermittent pain/cramping, mild skin induration). Overall, pt with Discussed with pt and daughter regarding the risks/benefits of conservative management and good outcomes with AC alone but pt elected to proceed with intervention, underwent successful mechanical thrombectomy of clot (very chronic) in iliac vein to popliteal vein followed by venoplasty with IVUS guidance on 2/27. Overall went well with significantly improved venous outflow.         Recommendations:  - S/p successful mechanical thrombectomy (MT) via IVUS 2/27  - Recommend Eliquis 10 mg twice daily x7d, then 5 mg twice daily ;  - Start aspirin 81mg daily   - Continue with elevation of RLE  - D/w attending Dr. Tirso Mendoza MD  Cardiology Fellow PGY-5  [facility]    Notes are not final until signed by attending  For all consults and questions:  [email or web address]   Login: [email or web address]

## 2024-02-28 NOTE — DISCHARGE NOTE PROVIDER - ATTENDING DISCHARGE PHYSICAL EXAMINATION:
T(C): 36.7 (02-28 @ 18:50), Max: 36.9 (02-28 @ 04:23)   HR: 90   BP: 137/66   RR: 18   SpO2: 95%    PHYSICAL EXAM:    CONSTITUTIONAL: NAD, well-developed, well-groomed  EYES: PERRLA; conjunctiva and sclera clear  ENMT: Moist oral mucosa, no pharyngeal injection or exudates; normal dentition  NECK: Supple, no palpable masses; no thyromegaly  RESPIRATORY: Normal respiratory effort; lungs are clear to auscultation bilaterally  CARDIOVASCULAR: Regular rate and rhythm, normal S1 and S2, no murmur/rub/gallop; No lower extremity edema; Peripheral pulses are 2+ bilaterally  ABDOMEN: Nontender to palpation, normoactive bowel sounds, no rebound/guarding; No hepatosplenomegaly  MUSCULOSKELETAL:  no clubbing or cyanosis of digits; no joint swelling or tenderness to palpation  PSYCH: A+O to person, place, and time; affect appropriate  NEUROLOGY: CN 2-12 are intact and symmetric; no gross sensory deficits   SKIN: No rashes; no palpable lesions

## 2024-02-28 NOTE — PHARMACOTHERAPY INTERVENTION NOTE - COMMENTS
Counseled patient on Eliquis (brand/generic), indication, directions for use (starter packet) and possible side effects (bleeding). Counseled patient on taking acetaminophen over the counter if needed and to avoid NSAID medications like Advil or Aleve since they can increase bleeding risk. Patient was provided with a medication card for their new medication. Patient questions and concerns were answered and addressed. Patient demonstrated understanding. Patient understood importance of compliance and to follow up with cardiologist once discharged.     Requested Eliquis Starter Packet for DVT/PE prescription to be sent to [facility]. [facility] will deliver the prescription to bedside before discharge.    Also reviewed with patient their medications for heart failure and provided CareNotes information sheet to patient at bedside. Counseled patient to observe and obtain daily weights and to notify doctor if >2-3 lbs/day or >5lbs/week weight gain, increased short of breath or using more pillows at nighttime. Patient exhibited understanding of heart failure medication regimen and management.       David (Jesús Truong  Transitions of Care Pharmacist  Available on Microsoft Teams (Preferred)  Spectra: [phone]     Time spent: 20 minutes

## 2024-02-28 NOTE — PROGRESS NOTE ADULT - SUBJECTIVE AND OBJECTIVE BOX
Andre Reyes, M.D.  Pager: [phone]  Office: [phone]    Patient is a 73y old  Female who presents with a chief complaint of RLE pain swelling (28 Feb 2024 09:51)          SUBJECTIVE / OVERNIGHT EVENTS:    No acute overnight events.    ROS: ( - ) Fever, ( - )Chills,  ( - )Nausea/Vomiting, ( - ) Cough, ( - )Shortness of breath, ( - )Chest Pain    MEDICATIONS  (STANDING):  allopurinol 100 milliGRAM(s) Oral daily  amLODIPine   Tablet 10 milliGRAM(s) Oral daily  apixaban 10 milliGRAM(s) Oral every 12 hours  aspirin enteric coated 81 milliGRAM(s) Oral daily  atorvastatin 10 milliGRAM(s) Oral at bedtime  chlorhexidine 2% Cloths 1 Application(s) Topical daily  dextrose 5%. 1000 milliLiter(s) (100 mL/Hr) IV Continuous <Continuous>  dextrose 5%. 1000 milliLiter(s) (50 mL/Hr) IV Continuous <Continuous>  dextrose 50% Injectable 12.5 Gram(s) IV Push once  dextrose 50% Injectable 25 Gram(s) IV Push once  dextrose 50% Injectable 25 Gram(s) IV Push once  furosemide    Tablet 40 milliGRAM(s) Oral two times a day  glucagon  Injectable 1 milliGRAM(s) IntraMuscular once  hydroxychloroquine 200 milliGRAM(s) Oral daily  insulin lispro (ADMELOG) corrective regimen sliding scale   SubCutaneous at bedtime  insulin lispro (ADMELOG) corrective regimen sliding scale   SubCutaneous three times a day before meals    MEDICATIONS  (PRN):  acetaminophen     Tablet .. 650 milliGRAM(s) Oral every 6 hours PRN Temp greater or equal to 38C (100.4F), Mild Pain (1 - 3)  dextrose Oral Gel 15 Gram(s) Oral once PRN Blood Glucose LESS THAN 70 milliGRAM(s)/deciliter          T(C): 36.6 (02-28 @ 11:12), Max: 36.9 (02-28 @ 04:23)   HR: 74   BP: 111/68   RR: 18   SpO2: 97%    PHYSICAL EXAM:    CONSTITUTIONAL: NAD, well-developed, well-groomed  EYES: PERRLA; conjunctiva and sclera clear  ENMT: Moist oral mucosa, no pharyngeal injection or exudates; normal dentition  NECK: Supple, no palpable masses; no thyromegaly  RESPIRATORY: Normal respiratory effort; lungs are clear to auscultation bilaterally  CARDIOVASCULAR: Regular rate and rhythm, normal S1 and S2, no murmur/rub/gallop; No lower extremity edema; Peripheral pulses are 2+ bilaterally  ABDOMEN: Nontender to palpation, normoactive bowel sounds, no rebound/guarding; No hepatosplenomegaly  MUSCULOSKELETAL:  no clubbing or cyanosis of digits; no joint swelling or tenderness to palpation  PSYCH: A+O to person, place, and time; affect appropriate  NEUROLOGY: CN 2-12 are intact and symmetric; no gross sensory deficits   SKIN: No rashes; no palpable lesions      LABS:                        13.1   9.31  )-----------( 218      ( 28 Feb 2024 06:21 )             39.6      02-28    138  |  101  |  30<H>  ----------------------------<  113<H>  3.6   |  22  |  1.37<H>    Ca    10.2      28 Feb 2024 06:21    TPro  6.9  /  Alb  3.6  /  TBili  0.5  /  DBili  x   /  AST  22  /  ALT  18  /  AlkPhos  78  02-27       CAPILLARY BLOOD GLUCOSE      POCT Blood Glucose.: 117 mg/dL (28 Feb 2024 07:33)  POCT Blood Glucose.: 94 mg/dL (27 Feb 2024 22:40)  POCT Blood Glucose.: 104 mg/dL (27 Feb 2024 11:47)      RADIOLOGY & ADDITIONAL TESTS:    Imaging Personally Reviewed:  Consultant(s) Notes Reviewed:    Care Discussed with Consultants/Other Providers:

## 2024-02-28 NOTE — DISCHARGE NOTE PROVIDER - HOSPITAL COURSE
HPI: 72 yo f with h/o pHTN, HTN, HLD, CKD3, RA, LUCIANO, ?CHF presented with RLE swelling and pain for 1 month found to have RLE extensive DVT on outpt study, Patient has had BILATERAL le swelling for about 1 month and has been treated with increased dose of lasix with Dr. Corley/Dr. Lorenzo. Swelling has improved though pt noted with RLE > LLE. some pain in calf and thigh as well though pt is unclear whether that was due to her underlying RA condition.  Patient denies any changes in exercise tolerance (some SUGGS at baseline due to obesity and deconditioning), no chest pain, palpitations, cough or hemoptysis, dizziness. Has had some mild dec in activity level due to overall weakness . no recent viral or illness. afebrile. no bowel or urinary sxs.     ED Course: In the ED, VS: afebrile, 131/68, 72, 18, 97% RA. resting comfortably without acute complaints.   Started on hep gtt .    Hospital Course: Patient diagnosed with PE and DVT, heparin drip started. CTA chest showed chronic thromboembolic disease involving lateral segment right middle lobe pulmonary arteries. Duplex showed: RLE DVT distal right external iliac vein from the calf and thrombus in the right common femoral vein appears mobile. Vascular surgery consulted, thrombectomy done on 2/27/2024. Patient transitioned to Eliquis PO on 2/28/2024.     Important Medication Changes and Reason:  New medication: Eliquis 10mg BID for the first 7 days, then Eliquis 5mg BID     Active or Pending Issues Requiring Follow-up:  Follow-up with PCP.   Follow-up with vascular cardiology, Dr. Chahal.     Advanced Directives:   [X] Full code  [ ] DNR  [ ] Hospice    Discharge Diagnoses:  PE  DVT       HPI: 72 yo f with h/o pHTN, HTN, HLD, CKD3, RA, LUCIANO, ?CHF presented with RLE swelling and pain for 1 month found to have RLE extensive DVT on outpt study, Patient has had BILATERAL le swelling for about 1 month and has been treated with increased dose of lasix with Dr. Corley/Dr. Lorenzo. Swelling has improved though pt noted with RLE > LLE. some pain in calf and thigh as well though pt is unclear whether that was due to her underlying RA condition.  Patient denies any changes in exercise tolerance (some SUGGS at baseline due to obesity and deconditioning), no chest pain, palpitations, cough or hemoptysis, dizziness. Has had some mild dec in activity level due to overall weakness . no recent viral or illness. afebrile. no bowel or urinary sxs.     ED Course: In the ED, VS: afebrile, 131/68, 72, 18, 97% RA. resting comfortably without acute complaints.   Started on hep gtt .    Hospital Course: Patient diagnosed with PE and DVT, heparin drip started. CTA chest showed chronic thromboembolic disease involving lateral segment right middle lobe pulmonary arteries. Duplex showed: RLE DVT distal right external iliac vein from the calf and thrombus in the right common femoral vein appears mobile. Vascular surgery consulted, thrombectomy done on 2/27/2024. Patient transitioned to Eliquis PO on 2/28/2024.     Important Medication Changes and Reason:  New medication: Eliquis 10mg BID for the first 7 days, then Eliquis 5mg BID     Active or Pending Issues Requiring Follow-up:  Follow-up with PCP.   Follow-up with vascular cardiology, Dr. Chahal.     Advanced Directives:   [X] Full code  [ ] DNR  [ ] Hospice    Discharge Diagnoses:  PE  DVT    Request from Dr. Reyes to facilitate patient discharge. Medication reconciliation reviewed, revised and resolved with Dr. Reyes, who has medically cleared patient for discharge with follow-up as advised.

## 2024-02-28 NOTE — DISCHARGE NOTE PROVIDER - NSDCMRMEDTOKEN_GEN_ALL_CORE_FT
allopurinol 100 mg oral tablet: 1 tab(s) orally once a day  aspirin 81 mg oral delayed release tablet: 1 tab(s) orally once a day  atorvastatin 10 mg oral tablet: 1 tab(s) orally once a day  Eliquis Starter Pack for Treatment of DVT and PE 5 mg oral tablet: 1 tab(s) orally 2 times a day take 10mg twice a day for 7 days followed by 5mg twice a day  furosemide 40 mg oral tablet: 1 tab(s) orally 2 times a day  hydroxychloroquine 200 mg oral tablet: 1 tab(s) orally once a day  Norvasc 10 mg oral tablet: 1 tab(s) orally once a day  potassium chloride 10 mEq oral tablet, extended release: 1 tab(s) orally 2 times a day  spironolactone 25 mg oral tablet: 1 tab(s) orally once a day  Tylenol 500 mg oral tablet: 2 tab(s) orally every 6 hours  Vitamin D2 tablet: 1 tablet orally once a day

## 2024-02-28 NOTE — DISCHARGE NOTE PROVIDER - PROVIDER TOKENS
PROVIDER:[TOKEN:[[id number]],FOLLOWUP:[1 month]] PROVIDER:[TOKEN:[[id number]],FOLLOWUP:[2 weeks]]

## 2024-02-28 NOTE — DISCHARGE NOTE PROVIDER - NSDCFUADDAPPT_GEN_ALL_CORE_FT
APPTS ARE READY TO BE MADE: [ ] YES    Best Family or Patient Contact (if needed):    Additional Information about above appointments (if needed):    1: Follow-up with PCP.  2: Follow-up with vascular cardiology.   3:     Other comments or requests:    APPTS ARE READY TO BE MADE: [ ] YES    Best Family or Patient Contact (if needed):    Additional Information about above appointments (if needed):    1: Follow-up with PCP.  2: Follow-up with vascular cardiology, Dr. Chahal in 2-3 weeks.  3:     Other comments or requests:    APPTS ARE READY TO BE MADE: [ x ] YES    Best Family or Patient Contact (if needed):    Additional Information about above appointments (if needed):    1: Follow-up with PCP.  2: Follow-up with vascular cardiology, Dr. Chahal in 2-3 weeks.  3:     Other comments or requests:

## 2024-02-28 NOTE — PROGRESS NOTE ADULT - REASON FOR ADMISSION
RLE pain swelling

## 2024-02-28 NOTE — DISCHARGE NOTE PROVIDER - NSDCCPCAREPLAN_GEN_ALL_CORE_FT
PRINCIPAL DISCHARGE DIAGNOSIS  Diagnosis: DVT, lower extremity  Assessment and Plan of Treatment: Patient diagnosed with PE and DVT, heparin drip started. CTA chest showed chronic thromboembolic disease involving lateral segment right middle lobe pulmonary arteries. Duplex showed: RLE DVT distal right external iliac vein from the calf and thrombus in the right common femoral vein appears mobile. Vascular surgery consulted, thrombectomy done on 2/27/2024. Patient transitioned to Eliquis PO on 2/28/2024.   Important Medication Changes and Reason:  New medication: Eliquis 10mg BID for the first 7 days, then Eliquis 5mg BID   Follow-up with PCP and vascular cardiologist, Dr. Chahal.     PRINCIPAL DISCHARGE DIAGNOSIS  Diagnosis: Pulmonary embolism  Assessment and Plan of Treatment: Patient diagnosed with PE and DVT, heparin drip started. CTA chest showed chronic thromboembolic disease involving lateral segment right middle lobe pulmonary arteries. Duplex showed: RLE DVT distal right external iliac vein from the calf and thrombus in the right common femoral vein appears mobile. Vascular surgery consulted, thrombectomy done on 2/27/2024. Patient transitioned to Eliquis PO on 2/28/2024.   Important Medication Changes and Reason:  New medication: Eliquis 10mg BID for the first 7 days, then Eliquis 5mg BID  Follow-up with vascular cardiology, Dr. Chahal in 2-3 weeks.      SECONDARY DISCHARGE DIAGNOSES  Diagnosis: DVT, lower extremity  Assessment and Plan of Treatment: Patient diagnosed with PE and DVT, heparin drip started. Vascular surgery consulted, thrombectomy done on 2/27/2024. Patient transitioned to Eliquis PO on 2/28/2024.   Important Medication Changes and Reason:  New medication: Eliquis 10mg BID for the first 7 days, then Eliquis 5mg BID   Follow-up with PCP and vascular cardiologist, Dr. Chahal.

## 2024-02-28 NOTE — PROGRESS NOTE ADULT - PROVIDER SPECIALTY LIST ADULT
Cardiology
Cardiology
Hospitalist
Hospitalist
Vascular Cardiology
Vascular Cardiology
Vascular Surgery
Cardiology
Hospitalist
Vascular Cardiology
Vascular Surgery
Cardiology
Cardiology
Vascular Surgery
Internal Medicine
Internal Medicine

## 2024-02-29 ENCOUNTER — APPOINTMENT (OUTPATIENT)
Dept: PULMONOLOGY | Facility: CLINIC | Age: 74
End: 2024-02-29

## 2024-03-13 ENCOUNTER — APPOINTMENT (OUTPATIENT)
Dept: VASCULAR SURGERY | Facility: CLINIC | Age: 74
End: 2024-03-13
Payer: MEDICARE

## 2024-03-13 ENCOUNTER — APPOINTMENT (OUTPATIENT)
Dept: INTERNAL MEDICINE | Facility: CLINIC | Age: 74
End: 2024-03-13
Payer: MEDICARE

## 2024-03-13 VITALS
TEMPERATURE: 98.1 F | BODY MASS INDEX: 40.66 KG/M2 | OXYGEN SATURATION: 98 % | HEART RATE: 96 BPM | WEIGHT: 253 LBS | HEIGHT: 66 IN | DIASTOLIC BLOOD PRESSURE: 70 MMHG | SYSTOLIC BLOOD PRESSURE: 120 MMHG

## 2024-03-13 VITALS
BODY MASS INDEX: 40.98 KG/M2 | HEART RATE: 93 BPM | HEIGHT: 66 IN | WEIGHT: 255 LBS | DIASTOLIC BLOOD PRESSURE: 76 MMHG | TEMPERATURE: 97.9 F | SYSTOLIC BLOOD PRESSURE: 147 MMHG

## 2024-03-13 DIAGNOSIS — I87.001 POSTTHROMBOTIC SYNDROME W/OUT COMPLICATIONS OF RIGHT LOWER EXTREMITY: ICD-10-CM

## 2024-03-13 PROCEDURE — G2211 COMPLEX E/M VISIT ADD ON: CPT | Mod: NC,1L

## 2024-03-13 PROCEDURE — 99213 OFFICE O/P EST LOW 20 MIN: CPT

## 2024-03-13 PROCEDURE — 99495 TRANSJ CARE MGMT MOD F2F 14D: CPT

## 2024-03-13 RX ORDER — ERGOCALCIFEROL (VITAMIN D2) 50 MCG
50 MCG CAPSULE ORAL
Qty: 90 | Refills: 1 | Status: COMPLETED | COMMUNITY
Start: 2020-08-26 | End: 2024-03-13

## 2024-03-13 RX ORDER — FOLIC ACID 1 MG/1
1 TABLET ORAL
Qty: 90 | Refills: 1 | Status: COMPLETED | COMMUNITY
Start: 2020-08-26 | End: 2024-03-13

## 2024-03-13 NOTE — HISTORY OF PRESENT ILLNESS
[Discharge Summary] : discharge summary [Pertinent Labs] : pertinent labs [Radiology Findings] : radiology findings [Discharge Med List] : discharge medication list [Med Reconciliation] : medication reconciliation has been completed [Patient Contacted By: ____] : and contacted by [unfilled] [Post-hospitalization from ___ Hospital] : Post-hospitalization from [unfilled] Hospital [Admitted on: ___] : The patient was admitted on [unfilled] [Discharged on ___] : discharged on [unfilled] [FreeTextEntry2] : HPI: 74 yo f with h/o pHTN, HTN, HLD, CKD3, RA, LUCIANO, CHF here for HFU. Pt presented with RLE swelling and pain ffound to have RLE extensive DVT on outpt study sent by me to ER. Started on hep gtt. CTA chest showed chronic thromboembolic disease involving lateral segment right middle lobe pulmonary arteries. Duplex showed: RLE DVT distal right external iliac vein from the calf and thrombus in the right common femoral vein appears mobile. MRA completed. Vascular surgery consulted, thrombectomy done on 2/27/2024. Patient transitioned to Eliquis PO on 2/28/2024.  Since discharge, she feels well and has no acute complaints today. Reports her breathing has improved. States her sister had Hx of blood clot. Denies recent travel or prolonged car ride.  Says she UTD with cancer screening. Is on Eliquis 5mg bid and doing well. Denies chest pain, SOB, SUGGS, dizziness, diaphoresis, palpitations, LE swelling, orthopnea, syncope, n/v, headache.

## 2024-03-13 NOTE — HISTORY OF PRESENT ILLNESS
[FreeTextEntry1] : 73 year old woman with multiple medical comorbidities recently hospitalized for right lower extremity iliofemoral DVT. She was initiated on anticoagulation and underwent successful percutaneous mechanical thrombectomy by Dr. Torsten Chahal. She was subsequently discharged uneventfully. [de-identified] : Today she states that many of her symptoms have improved. She has been compliant with Eliquis. She has not been compliant with leg elevation at rest. She is wearing compression stockings at today's visit and states she has been compliant with them as well. She denies chest pain or shortness of breath.

## 2024-03-13 NOTE — PHYSICAL EXAM
[No Acute Distress] : no acute distress [Well Nourished] : well nourished [Well Developed] : well developed [Well-Appearing] : well-appearing [Normal Sclera/Conjunctiva] : normal sclera/conjunctiva [No JVD] : no jugular venous distention [Normal Outer Ear/Nose] : the outer ears and nose were normal in appearance [Normal Oropharynx] : the oropharynx was normal [No Lymphadenopathy] : no lymphadenopathy [Supple] : supple [No Respiratory Distress] : no respiratory distress  [No Accessory Muscle Use] : no accessory muscle use [Clear to Auscultation] : lungs were clear to auscultation bilaterally [Normal Rate] : normal rate  [Regular Rhythm] : with a regular rhythm [Normal S1, S2] : normal S1 and S2 [No Carotid Bruits] : no carotid bruits [No Murmur] : no murmur heard [No Varicosities] : no varicosities [Pedal Pulses Present] : the pedal pulses are present [No Extremity Clubbing/Cyanosis] : no extremity clubbing/cyanosis [Soft] : abdomen soft [Non Tender] : non-tender [Non-distended] : non-distended [Normal Bowel Sounds] : normal bowel sounds [Normal Anterior Cervical Nodes] : no anterior cervical lymphadenopathy [No CVA Tenderness] : no CVA  tenderness [No Spinal Tenderness] : no spinal tenderness [Grossly Normal Strength/Tone] : grossly normal strength/tone [No Joint Swelling] : no joint swelling [No Rash] : no rash [Coordination Grossly Intact] : coordination grossly intact [No Focal Deficits] : no focal deficits [Normal Gait] : normal gait [Alert and Oriented x3] : oriented to person, place, and time [de-identified] : bilateral trace edema 1/3 of the way up R>L but improved from before

## 2024-03-13 NOTE — ASSESSMENT
[FreeTextEntry1] : Problem #1 chronic right iliofemoral DVT s/p percutaneous mechanical thrombectomy with improvement in symptoms continue Eliquis daily given history of prior DVT and PE would recommend lifelong anticoagulation strongly encouraged leg elevation at rest continue compression garments daily as tolerated  Problem #2 post-thrombotic syndrome of right leg Hobbs Score 5 - mild recommend to continue compression and elevation

## 2024-03-13 NOTE — PHYSICAL EXAM
[Respiratory Effort] : normal respiratory effort [Normal Rate and Rhythm] : normal rate and rhythm [2+] : left 2+ [Ankle Swelling (On Exam)] : present [Varicose Veins Of Lower Extremities] : not present [] : bilaterally [Ankle Swelling On The Right] : mild [Abdomen Tenderness] : ~T ~M No abdominal tenderness [Skin Ulcer] : no ulcer [Alert] : alert [Oriented to Person] : oriented to person [Oriented to Place] : oriented to place [Oriented to Time] : oriented to time [Calm] : calm [de-identified] : appears stated age [de-identified] : normocephalic, atraumatic [de-identified] : supple

## 2024-03-13 NOTE — HEALTH RISK ASSESSMENT
[0] : 2) Feeling down, depressed, or hopeless: Not at all (0) [PHQ-2 Negative - No further assessment needed] : PHQ-2 Negative - No further assessment needed [Never] : Never [[id number]] : 0

## 2024-03-14 LAB
ALBUMIN SERPL ELPH-MCNC: 4.4 G/DL
ALP BLD-CCNC: 87 U/L
ALT SERPL-CCNC: 12 U/L
ANION GAP SERPL CALC-SCNC: 12 MMOL/L
AST SERPL-CCNC: 16 U/L
BASOPHILS # BLD AUTO: 0.04 K/UL
BASOPHILS NFR BLD AUTO: 0.4 %
BILIRUB SERPL-MCNC: 0.5 MG/DL
BUN SERPL-MCNC: 33 MG/DL
CALCIUM SERPL-MCNC: 10.3 MG/DL
CHLORIDE SERPL-SCNC: 103 MMOL/L
CO2 SERPL-SCNC: 26 MMOL/L
CONFIRM: 72.4 SEC
CREAT SERPL-MCNC: 1.24 MG/DL
DRVVT 1H NP PPP: 47.3 SEC
DRVVT IMM 1:2 NP PPP: NORMAL
DRVVT SCREEN TO CONFIRM RATIO: 0.79 RATIO
EGFR: 45 ML/MIN/1.73M2
EOSINOPHIL # BLD AUTO: 0.2 K/UL
EOSINOPHIL NFR BLD AUTO: 2.1 %
GLUCOSE SERPL-MCNC: 91 MG/DL
HCT VFR BLD CALC: 38.6 %
HCYS SERPL-MCNC: 34.5 UMOL/L
HGB BLD-MCNC: 12.4 G/DL
IMM GRANULOCYTES NFR BLD AUTO: 0.8 %
INR PPP: 1.5 RATIO
LYMPHOCYTES # BLD AUTO: 2.04 K/UL
LYMPHOCYTES NFR BLD AUTO: 21.9 %
MAN DIFF?: NORMAL
MCHC RBC-ENTMCNC: 30.2 PG
MCHC RBC-ENTMCNC: 32.1 GM/DL
MCV RBC AUTO: 94.1 FL
MONOCYTES # BLD AUTO: 0.65 K/UL
MONOCYTES NFR BLD AUTO: 7 %
NEUTROPHILS # BLD AUTO: 6.33 K/UL
NEUTROPHILS NFR BLD AUTO: 67.8 %
NT-PROBNP SERPL-MCNC: 664 PG/ML
PLATELET # BLD AUTO: 265 K/UL
POTASSIUM SERPL-SCNC: 4 MMOL/L
PROT C PPP CHRO-ACNC: 136 %
PROT S AG ACT/NOR PPP IA: 78 %
PROT SERPL-MCNC: 7.6 G/DL
PT BLD: 16.7 SEC
RBC # BLD: 4.1 M/UL
RBC # FLD: 14 %
SCREEN DRVVT: 67.3 SEC
SODIUM SERPL-SCNC: 140 MMOL/L
WBC # FLD AUTO: 9.33 K/UL

## 2024-03-15 ENCOUNTER — NON-APPOINTMENT (OUTPATIENT)
Age: 74
End: 2024-03-15

## 2024-03-15 LAB
B2 GLYCOPROT1 IGA SERPL IA-ACNC: 45.8 SAU
B2 GLYCOPROT1 IGG SER-ACNC: 11 SGU
B2 GLYCOPROT1 IGM SER-ACNC: <5 SMU
CARDIOLIPIN AB SER IA-ACNC: NEGATIVE

## 2024-03-18 ENCOUNTER — NON-APPOINTMENT (OUTPATIENT)
Age: 74
End: 2024-03-18

## 2024-03-18 LAB
DNA PLOIDY SPEC FC-IMP: NORMAL
PROT S FREE PPP-ACNC: 132 %

## 2024-03-19 LAB
CARDIOLIPIN IGM SER-MCNC: 8.1 GPL
CARDIOLIPIN IGM SER-MCNC: <5 MPL

## 2024-03-20 RX ORDER — ORLISTAT 120 MG/1
120 CAPSULE ORAL 3 TIMES DAILY
Qty: 270 | Refills: 0 | Status: ACTIVE | COMMUNITY
Start: 2024-03-20 | End: 1900-01-01

## 2024-03-22 ENCOUNTER — NON-APPOINTMENT (OUTPATIENT)
Age: 74
End: 2024-03-22

## 2024-03-22 ENCOUNTER — APPOINTMENT (OUTPATIENT)
Dept: CARDIOLOGY | Facility: CLINIC | Age: 74
End: 2024-03-22
Payer: MEDICARE

## 2024-03-22 ENCOUNTER — OUTPATIENT (OUTPATIENT)
Dept: OUTPATIENT SERVICES | Facility: HOSPITAL | Age: 74
LOS: 1 days | Discharge: HOME | End: 2024-03-22

## 2024-03-22 VITALS
HEIGHT: 66 IN | DIASTOLIC BLOOD PRESSURE: 70 MMHG | SYSTOLIC BLOOD PRESSURE: 120 MMHG | HEART RATE: 77 BPM | OXYGEN SATURATION: 99 % | TEMPERATURE: 97.6 F | BODY MASS INDEX: 41.3 KG/M2 | WEIGHT: 257 LBS

## 2024-03-22 DIAGNOSIS — I49.3 VENTRICULAR PREMATURE DEPOLARIZATION: ICD-10-CM

## 2024-03-22 DIAGNOSIS — I82.409 ACUTE EMBOLISM AND THROMBOSIS OF UNSPECIFIED DEEP VEINS OF UNSPECIFIED LOWER EXTREMITY: ICD-10-CM

## 2024-03-22 PROCEDURE — 99214 OFFICE O/P EST MOD 30 MIN: CPT

## 2024-03-22 PROCEDURE — G2211 COMPLEX E/M VISIT ADD ON: CPT

## 2024-03-22 PROCEDURE — 93000 ELECTROCARDIOGRAM COMPLETE: CPT

## 2024-03-22 RX ORDER — CYANOCOBALAMIN/FOLIC AC/VIT B6 1-2.2-25MG
2.2-25-1 TABLET ORAL DAILY
Qty: 90 | Refills: 3 | Status: ACTIVE | COMMUNITY
Start: 2024-03-22 | End: 1900-01-01

## 2024-03-22 NOTE — PHYSICAL EXAM
[Well Developed] : well developed [Well Nourished] : well nourished [No Acute Distress] : no acute distress [Normal Conjunctiva] : normal conjunctiva [Normal Venous Pressure] : normal venous pressure [No Carotid Bruit] : no carotid bruit [Normal S1, S2] : normal S1, S2 [No Murmur] : no murmur [No Rub] : no rub [No Gallop] : no gallop [Clear Lung Fields] : clear lung fields [Good Air Entry] : good air entry [No Respiratory Distress] : no respiratory distress  [Non Tender] : non-tender [Soft] : abdomen soft [No Masses/organomegaly] : no masses/organomegaly [Normal Bowel Sounds] : normal bowel sounds [Normal Gait] : normal gait [No Edema] : no edema [No Cyanosis] : no cyanosis [No Clubbing] : no clubbing [No Rash] : no rash [No Varicosities] : no varicosities [No Skin Lesions] : no skin lesions [Moves all extremities] : moves all extremities [No Focal Deficits] : no focal deficits [Normal Speech] : normal speech [Alert and Oriented] : alert and oriented [Normal memory] : normal memory

## 2024-03-22 NOTE — HISTORY OF PRESENT ILLNESS
[FreeTextEntry1] : This is a 72 y/o female with a pmhx of CKD, HTN, pulm HTN here today for a follow up. Patient feels well overall.  tolerating Eliquis well . Nathaly any chest pain or SOB Patient denies chest pain, palpitations, dizziness, syncope, changes in bowel/bladder habits or appetite.pt concerned about weight status

## 2024-03-25 ENCOUNTER — APPOINTMENT (OUTPATIENT)
Dept: HEMATOLOGY ONCOLOGY | Facility: CLINIC | Age: 74
End: 2024-03-25
Payer: MEDICARE

## 2024-03-25 VITALS
WEIGHT: 256.38 LBS | DIASTOLIC BLOOD PRESSURE: 82 MMHG | HEART RATE: 68 BPM | SYSTOLIC BLOOD PRESSURE: 135 MMHG | BODY MASS INDEX: 41.2 KG/M2 | OXYGEN SATURATION: 99 % | HEIGHT: 66.26 IN | RESPIRATION RATE: 18 BRPM | TEMPERATURE: 97.8 F

## 2024-03-25 PROCEDURE — 99205 OFFICE O/P NEW HI 60 MIN: CPT

## 2024-03-25 RX ORDER — APIXABAN 5 MG/1
5 TABLET, FILM COATED ORAL
Qty: 180 | Refills: 1 | Status: ACTIVE | COMMUNITY
Start: 2024-03-13 | End: 1900-01-01

## 2024-03-25 NOTE — ASSESSMENT
[FreeTextEntry1] : AUSTIN SILVERIO is a 73 year woman with PMH of pulmonary HTN, HTN, HLD, CKD, RA, LUCIANO, CHF, who presents for Hematology evaluation of unprovoked PE.  # Unprovoked VTE: She saw her primary doctor (Dr. Lorenzo) on 2/22/24 with complaints of RLE swelling and pain. Lower extremity Dopplers showed a non-occlusive DVT in the right common femoral, femoral, and popliteal veins. CT-A chest showed chronic thromboembolic disease involving lateral segment right middle lobe pulmonary arteries. TTE was normal. Repeat Dopplers showed a DVT extending from the right external iliac vein to the calf, and the thrombus in the right common femoral vein appeared mobile. Vascular Surgery performed a thrombectomy on 2/27/24. She is UTD with age-appropriate cancer screening. Thrombophilia work up was overall normal. 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 - Continue Eliquis 5 mg BID. Will plan for at least 6 months of therapeutic dosing, after which we can continue preventative dosing at 2.5 mg BID - Will reach out to Dr. Corley to see if she has an indication for aspirin. If not, would favor discontinuing to minimize bleeding complications - Labs: CBC, CMP, D-dimer, PT gene mutation - Patient should return to clinic in 5 months

## 2024-03-25 NOTE — HISTORY OF PRESENT ILLNESS
[de-identified] : AUSTIN SILVERIO is a 73 year woman with PMH of pulmonary HTN, HTN, HLD, CKD, RA, LUCIANO, CHF, who presents for Hematology evaluation of unprovoked PE.  She saw her primary doctor (Dr. Lorenzo) on 2/22/24 with complaints of RLE swelling and pain. Lower extremity Dopplers showed a non-occlusive DVT in the right common femoral, femoral, and popliteal veins. She was sent to the ED for further evaluation and was started on a heparin drip. CT-A chest showed chronic thromboembolic disease involving lateral segment right middle lobe pulmonary arteries. TTE was normal. Repeat Dopplers showed a DVT extending from the right external iliac vein to the calf, and the thrombus in the right common femoral vein appeared mobile. Vascular Surgery performed a thrombectomy on 2/27/24. She was transitioned to therapeutic Eliquis and discharged home.   She is UTD with age-appropriate cancer screening: mammogram 1/9/24- unremarkable, colonoscopy 8/22/23- unremarkable. Dr. Lorenzo performed a thrombophilia work up outpatient: normal APLS labs, no Factor V Leiden mutation, normal protein C and S. Homocysteine level was elevated (34.5).   Today she presents to establish outpatient Hematology care. She continues Eliquis 5 mg BID without any bleeding complications. She is also on a baby aspirin that was started inpatient. She has mild but improved right lower extremity edema. 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

## 2024-03-25 NOTE — PHYSICAL EXAM
[Restricted in physically strenuous activity but ambulatory and able to carry out work of a light or sedentary nature] : Status 1- Restricted in physically strenuous activity but ambulatory and able to carry out work of a light or sedentary nature, e.g., light house work, office work [Normal] : affect appropriate [de-identified] : trace right lower extremity edema

## 2024-03-28 ENCOUNTER — LABORATORY RESULT (OUTPATIENT)
Age: 74
End: 2024-03-28

## 2024-03-28 ENCOUNTER — APPOINTMENT (OUTPATIENT)
Dept: PULMONOLOGY | Facility: CLINIC | Age: 74
End: 2024-03-28
Payer: MEDICARE

## 2024-03-28 VITALS
HEIGHT: 66 IN | BODY MASS INDEX: 40.98 KG/M2 | RESPIRATION RATE: 16 BRPM | OXYGEN SATURATION: 98 % | HEART RATE: 76 BPM | WEIGHT: 255 LBS | DIASTOLIC BLOOD PRESSURE: 76 MMHG | SYSTOLIC BLOOD PRESSURE: 133 MMHG

## 2024-03-28 PROCEDURE — ZZZZZ: CPT

## 2024-03-28 PROCEDURE — 94729 DIFFUSING CAPACITY: CPT

## 2024-03-28 PROCEDURE — 94010 BREATHING CAPACITY TEST: CPT

## 2024-03-28 PROCEDURE — 94727 GAS DIL/WSHOT DETER LNG VOL: CPT

## 2024-03-28 PROCEDURE — 99214 OFFICE O/P EST MOD 30 MIN: CPT | Mod: 25

## 2024-03-28 NOTE — HISTORY OF PRESENT ILLNESS
[Never] : never [TextBox_4] : 71-year-old female HEME Chronic deep vein thrombosis (DVT) of right iliac vein (453.51) (I82.521) AUSTIN SILVERIO is a 73 year woman with PMH of pulmonary HTN, HTN, HLD, CKD, RA, LUCIANO, CHF, who presents for Hematology evaluation of unprovoked PE.  # Unprovoked VTE: She saw her primary doctor (Dr. Lorenzo) on 2/22/24 with complaints of RLE swelling and pain. Lower extremity Dopplers showed a non-occlusive DVT in the right common femoral, femoral, and popliteal veins. CT-A chest showed chronic thromboembolic disease involving lateral segment right middle lobe pulmonary arteries. TTE was normal. Repeat Dopplers showed a DVT extending from the right external iliac vein to the calf, and the thrombus in the right common femoral vein appeared mobile. Vascular Surgery performed a thrombectomy on 2/27/24. She is UTD with age-appropriate cancer screening. Thrombophilia work up was overall normal. 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 - Continue Eliquis 5 mg BID. Will plan for at least 6 months of therapeutic dosing, after which we can continue preventative dosing at 2.5 mg BID  f/u sleep evaulation occ dry  cough  no active complaints on RA Resp sxs Patient was diagnosed June 6, 2022 at [facility] emergency department with left lower lobe pneumonia Treatment included Zithromax and Augmentin completed She had some ongoing symptoms feeling weak lightheaded dizzy shortness of breath Urgent care center to the emergency department Reported a chest x-ray with a left lower lobe pneumonia although films not available for review Questionable positive wheeze No sputum production no hemoptysis No fevers chills at present Also had some associated mild atypical chest congestion Significant past medical history rheumatoid arthritis Hyperlipidemia Hypertension Obstructive sleep apnea without active treatment Non-smoker No history of chemical toxic inhalational exposures Patient denies history of a COVID-19 infection Should be noted at the emergency department flu and COVID-19 PCR both negative

## 2024-03-28 NOTE — PROCEDURE
[FreeTextEntry1] : EXAM: CT ANGIO CHEST PULM ART [facility] ORDERED BY: MARTHA OQUENDO PROCEDURE DATE: 02/22/2024 INTERPRETATION: CLINICAL INFORMATION: Dyspnea. DVT. COMPARISON: Coronary CTA February 3, 2021 CONTRAST/COMPLICATIONS: IV Contrast: Omnipaque 350 55 cc administered 45 cc discarded Oral Contrast: NONE Complications: None reported at time of study completion PROCEDURE: CT Angiography of the Chest. Sagittal and coronal reformats were performed as well as 3D (MIP) reconstructions. FINDINGS: LUNGS AND AIRWAYS: Patent central airways. Ovoid right middle lobe groundglass airspace opacity, most consistent with pulmonary infarct. PLEURA: No pleural effusion. MEDIASTINUM AND CLAUS: No lymphadenopathy. VESSELS: Right middle lobe lateral segment pulmonary arteries are occluded and attenuated, likely the sequelae of chronic thromboembolic disease. No additional filling defect identified. HEART: Heart size is normal. No pericardial effusion. Aortic root and mitral annular calcifications. CHEST WALL AND LOWER NECK: Within normal limits. VISUALIZED UPPER ABDOMEN: Diverticulosis. Scattered low-attenuation hepatic lesions too small to accurately characterize. BONES: Degenerative changes. Chronic left anterior lower rib fracture deformities. 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 Findings are new since 2/3/2021. No evidence of additional filling defect within the pulmonary arteries.  PFT  3/28/24 rufina nl flow rates lung volumes  nl DLCO 68 % mild reduction gas exchange impairment HGB 12.4  Sleep study January 20 and January 21, 2024 Mild obstructive sleep apnea Note nonpositional 1% time less than 90% on room air Addressed with patient at office follow-up treatment protocols focus primarily on CPAP based on symptoms  PFT September 28, 2023 Flow rates normal range Ratio 77 Lung volumes normal TLC 95% predicted Diffusion 72% predicted which is normal range Hemoglobin 12.4 Stable pulmonary physiology  Pulmonary 6-minute walk exercise study Indication exertional dyspnea Baseline O2 saturation 98% Negative desaturation to 90% This does not qualify for portable oxygen therapy   PFT June 30, 2023 Flow rates normal FEV1 94% predicted Lung volumes normal TLC 23% predicted Decreased ERV secondary to truncal obesity Diffusion normal range 75% predicted Hemoglobin 12.4 Overall stable pulmonary physiology  Chest x-ray PA lateral June 30, 2023 Borderline cardiomegaly Lung fields are clear No parenchymal infiltrates pleural effusions dominant pulmonary nodules Soft tissue bony structures unremarkable Mediastinum unremarkable Lamination right hemidiaphragm No interval change dating back to chest x-ray June 16, 2022  PFT 10/12/22 flow rates nl Lung Volumes nl no  airtrapping  DLCO 85 % WNL  resistance and sp conductance nl stable pulmonary physiology  PFT no bronchodilator June 16, 2022 Flow rates normal Lung volumes normal TLC 93% predicted Diffusion 67% of predicted with a mild loss of functioning alveolar capillary units Clinical correlation  Chest x-ray PA lateral June 16, 2022 reported left lower lobe pneumonia No prior films available for review Globular cardiac size but not grossly enlarged borderline Lamination right hemidiaphragm No evidence of infiltrates bilateral Left lower lobe clear  High-dose flu vaccine September 28, 2023

## 2024-03-28 NOTE — DISCUSSION/SUMMARY
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 Findings are new since 2/3/2021. s/p Pos DVT noted Heme  on Eliquis  not provoked   might need to consider long term AC Hypercoag w/u noted re scan CT PA 6 months  Sleep study January 20 and January 21, 2024 revall vendor to get CPAP Mild obstructive sleep apnea Note nonpositional 1% time less than 90% on room air Addressed with patient at office follow-up treatment protocols focus primarily on CPAP based on symptoms set UP  AUTO Set 6-16 cm H20 Goals usage 70 % hours > 4 AHI < 5 f/u 4 - 6 weeks Mild chronic dyspnea better Rule out physical deconditioning with overweight associated No strong evidence for pulmonary parenchymal lung disease stable pul physiology Weight loss walking exercise Follow-up cardiology noted We discussed the issue of sleep apnea management which can contribute to dyspnea although patient states she declines further work-up at present time BUT will agree at today's visit Noted remains on Plaquenil  long-term methotrexate and now OFF recommended COVID vaccine and RSV Pneumonia  vaccine up  to  date Maintain up-to-date cardiology visits

## 2024-03-28 NOTE — REASON FOR VISIT
[Follow-Up - From Hospitalization] : a follow-up visit after a recent hospitalization [TextBox_44] : Mild pulmonary hypertension, dyspnea, PE and DVT

## 2024-04-08 LAB
25(OH)D3 SERPL-MCNC: 51 NG/ML
25(OH)D3 SERPL-MCNC: 54.1 NG/ML
25(OH)D3 SERPL-MCNC: 56.7 NG/ML
ALBUMIN SERPL ELPH-MCNC: 3.8 G/DL
ALBUMIN SERPL ELPH-MCNC: 3.9 G/DL
ALBUMIN SERPL ELPH-MCNC: 4.1 G/DL
ALBUMIN SERPL ELPH-MCNC: 4.2 G/DL
ALP BLD-CCNC: 83 U/L
ALP BLD-CCNC: 92 U/L
ALP BLD-CCNC: 93 U/L
ALP BLD-CCNC: 95 U/L
ALT SERPL-CCNC: 10 U/L
ALT SERPL-CCNC: 11 U/L
ALT SERPL-CCNC: 13 U/L
ALT SERPL-CCNC: 14 U/L
ANA PAT FLD IF-IMP: ABNORMAL
ANA PAT FLD IF-IMP: ABNORMAL
ANA SER IF-ACNC: ABNORMAL
ANA SER IF-ACNC: ABNORMAL
ANION GAP SERPL CALC-SCNC: 12 MMOL/L
ANION GAP SERPL CALC-SCNC: 13 MMOL/L
ANION GAP SERPL CALC-SCNC: 14 MMOL/L
ANION GAP SERPL CALC-SCNC: 14 MMOL/L
AST SERPL-CCNC: 16 U/L
AST SERPL-CCNC: 18 U/L
AST SERPL-CCNC: 19 U/L
AST SERPL-CCNC: 20 U/L
BASOPHILS # BLD AUTO: 0.03 K/UL
BASOPHILS # BLD AUTO: 0.03 K/UL
BASOPHILS # BLD AUTO: 0.05 K/UL
BASOPHILS NFR BLD AUTO: 0.4 %
BASOPHILS NFR BLD AUTO: 0.4 %
BASOPHILS NFR BLD AUTO: 0.7 %
BILIRUB DIRECT SERPL-MCNC: 0.1 MG/DL
BILIRUB DIRECT SERPL-MCNC: 0.1 MG/DL
BILIRUB DIRECT SERPL-MCNC: 0.2 MG/DL
BILIRUB INDIRECT SERPL-MCNC: 0.2 MG/DL
BILIRUB INDIRECT SERPL-MCNC: 0.3 MG/DL
BILIRUB INDIRECT SERPL-MCNC: 0.3 MG/DL
BILIRUB SERPL-MCNC: 0.3 MG/DL
BILIRUB SERPL-MCNC: 0.4 MG/DL
BILIRUB SERPL-MCNC: 0.4 MG/DL
BILIRUB SERPL-MCNC: 0.5 MG/DL
BUN SERPL-MCNC: 16 MG/DL
BUN SERPL-MCNC: 17 MG/DL
BUN SERPL-MCNC: 19 MG/DL
BUN SERPL-MCNC: 21 MG/DL
BUN SERPL-MCNC: 24 MG/DL
BUN SERPL-MCNC: 24 MG/DL
CALCIUM SERPL-MCNC: 10 MG/DL
CALCIUM SERPL-MCNC: 10.2 MG/DL
CALCIUM SERPL-MCNC: 10.2 MG/DL
CALCIUM SERPL-MCNC: 9.6 MG/DL
CALCIUM SERPL-MCNC: 9.8 MG/DL
CALCIUM SERPL-MCNC: 9.9 MG/DL
CCP AB SER IA-ACNC: <8 UNITS
CHLORIDE SERPL-SCNC: 102 MMOL/L
CHLORIDE SERPL-SCNC: 104 MMOL/L
CHLORIDE SERPL-SCNC: 104 MMOL/L
CHLORIDE SERPL-SCNC: 105 MMOL/L
CHOLEST SERPL-MCNC: 137 MG/DL
CHOLEST SERPL-MCNC: 153 MG/DL
CHOLEST SERPL-MCNC: 165 MG/DL
CHOLEST SERPL-MCNC: 167 MG/DL
CK SERPL-CCNC: 117 U/L
CK SERPL-CCNC: 121 U/L
CK SERPL-CCNC: 93 U/L
CK SERPL-CCNC: 94 U/L
CO2 SERPL-SCNC: 23 MMOL/L
CO2 SERPL-SCNC: 26 MMOL/L
CO2 SERPL-SCNC: 28 MMOL/L
CREAT SERPL-MCNC: 1 MG/DL
CREAT SERPL-MCNC: 1.04 MG/DL
CREAT SERPL-MCNC: 1.06 MG/DL
CREAT SERPL-MCNC: 1.09 MG/DL
CREAT SERPL-MCNC: 1.25 MG/DL
CREAT SERPL-MCNC: 1.26 MG/DL
CREAT SPEC-SCNC: 110 MG/DL
CRP SERPL-MCNC: 9 MG/L
DSDNA AB SER-ACNC: <12 IU/ML
DSDNA AB SER-ACNC: <12 IU/ML
EGFR: 45 ML/MIN/1.73M2
EGFR: 45 ML/MIN/1.73M2
EGFR: 55 ML/MIN/1.73M2
EGFR: 57 ML/MIN/1.73M2
EGFR: 59 ML/MIN/1.73M2
EOSINOPHIL # BLD AUTO: 0.15 K/UL
EOSINOPHIL # BLD AUTO: 0.16 K/UL
EOSINOPHIL # BLD AUTO: 0.23 K/UL
EOSINOPHIL NFR BLD AUTO: 2.1 %
EOSINOPHIL NFR BLD AUTO: 2.2 %
EOSINOPHIL NFR BLD AUTO: 3.2 %
ERYTHROCYTE [SEDIMENTATION RATE] IN BLOOD BY WESTERGREN METHOD: 26 MM/HR
ERYTHROCYTE [SEDIMENTATION RATE] IN BLOOD BY WESTERGREN METHOD: 43 MM/HR
ESTIMATED AVERAGE GLUCOSE: 117 MG/DL
ESTIMATED AVERAGE GLUCOSE: 120 MG/DL
FERRITIN SERPL-MCNC: 190 NG/ML
FERRITIN SERPL-MCNC: 305 NG/ML
FOLATE SERPL-MCNC: >20 NG/ML
FOLATE SERPL-MCNC: >20 NG/ML
GLUCOSE SERPL-MCNC: 100 MG/DL
GLUCOSE SERPL-MCNC: 103 MG/DL
GLUCOSE SERPL-MCNC: 105 MG/DL
GLUCOSE SERPL-MCNC: 84 MG/DL
GLUCOSE SERPL-MCNC: 91 MG/DL
GLUCOSE SERPL-MCNC: 99 MG/DL
HAPTOGLOB SERPL-MCNC: 109 MG/DL
HBA1C MFR BLD HPLC: 5.7 %
HBA1C MFR BLD HPLC: 5.8 %
HCT VFR BLD CALC: 36.4 %
HCT VFR BLD CALC: 37.3 %
HCT VFR BLD CALC: 39 %
HDLC SERPL-MCNC: 47 MG/DL
HDLC SERPL-MCNC: 57 MG/DL
HDLC SERPL-MCNC: 57 MG/DL
HDLC SERPL-MCNC: 61 MG/DL
HGB BLD-MCNC: 11.4 G/DL
HGB BLD-MCNC: 11.5 G/DL
HGB BLD-MCNC: 12.3 G/DL
IMM GRANULOCYTES NFR BLD AUTO: 0.3 %
IMM GRANULOCYTES NFR BLD AUTO: 0.3 %
IMM GRANULOCYTES NFR BLD AUTO: 0.4 %
IRON SATN MFR SERPL: 15 %
IRON SERPL-MCNC: 39 UG/DL
IRON SERPL-MCNC: 43 UG/DL
LDH SERPL-CCNC: 258 U/L
LDLC SERPL CALC-MCNC: 77 MG/DL
LDLC SERPL CALC-MCNC: 85 MG/DL
LDLC SERPL CALC-MCNC: 88 MG/DL
LDLC SERPL CALC-MCNC: 93 MG/DL
LDLC SERPL DIRECT ASSAY-MCNC: 74 MG/DL
LYMPHOCYTES # BLD AUTO: 1.52 K/UL
LYMPHOCYTES # BLD AUTO: 1.67 K/UL
LYMPHOCYTES # BLD AUTO: 1.79 K/UL
LYMPHOCYTES NFR BLD AUTO: 21 %
LYMPHOCYTES NFR BLD AUTO: 23 %
LYMPHOCYTES NFR BLD AUTO: 25.2 %
MAGNESIUM SERPL-MCNC: 1.8 MG/DL
MAGNESIUM SERPL-MCNC: 2 MG/DL
MAGNESIUM SERPL-MCNC: 2.1 MG/DL
MAGNESIUM SERPL-MCNC: 2.1 MG/DL
MAN DIFF?: NORMAL
MCHC RBC-ENTMCNC: 29.2 PG
MCHC RBC-ENTMCNC: 29.2 PG
MCHC RBC-ENTMCNC: 29.3 PG
MCHC RBC-ENTMCNC: 30.8 GM/DL
MCHC RBC-ENTMCNC: 31.3 GM/DL
MCHC RBC-ENTMCNC: 31.5 GM/DL
MCV RBC AUTO: 92.6 FL
MCV RBC AUTO: 93.6 FL
MCV RBC AUTO: 94.7 FL
MICROALBUMIN 24H UR DL<=1MG/L-MCNC: 3.8 MG/DL
MICROALBUMIN/CREAT 24H UR-RTO: 34 MG/G
MONOCYTES # BLD AUTO: 0.47 K/UL
MONOCYTES # BLD AUTO: 0.51 K/UL
MONOCYTES # BLD AUTO: 0.58 K/UL
MONOCYTES NFR BLD AUTO: 6.5 %
MONOCYTES NFR BLD AUTO: 7.2 %
MONOCYTES NFR BLD AUTO: 8 %
NEUTROPHILS # BLD AUTO: 4.6 K/UL
NEUTROPHILS # BLD AUTO: 4.71 K/UL
NEUTROPHILS # BLD AUTO: 5.04 K/UL
NEUTROPHILS NFR BLD AUTO: 64.8 %
NEUTROPHILS NFR BLD AUTO: 64.8 %
NEUTROPHILS NFR BLD AUTO: 69.5 %
NONHDLC SERPL-MCNC: 106 MG/DL
NONHDLC SERPL-MCNC: 109 MG/DL
NONHDLC SERPL-MCNC: 89 MG/DL
NONHDLC SERPL-MCNC: 96 MG/DL
NT-PROBNP SERPL-MCNC: 673 PG/ML
NT-PROBNP SERPL-MCNC: 724 PG/ML
PLATELET # BLD AUTO: 235 K/UL
PLATELET # BLD AUTO: 238 K/UL
PLATELET # BLD AUTO: 246 K/UL
POTASSIUM SERPL-SCNC: 3.3 MMOL/L
POTASSIUM SERPL-SCNC: 3.6 MMOL/L
POTASSIUM SERPL-SCNC: 3.7 MMOL/L
POTASSIUM SERPL-SCNC: 3.8 MMOL/L
POTASSIUM SERPL-SCNC: 4 MMOL/L
POTASSIUM SERPL-SCNC: 4.5 MMOL/L
PROT SERPL-MCNC: 7.2 G/DL
PROT SERPL-MCNC: 7.2 G/DL
PROT SERPL-MCNC: 7.5 G/DL
PROT SERPL-MCNC: 7.9 G/DL
RBC # BLD: 3.89 M/UL
RBC # BLD: 3.94 M/UL
RBC # BLD: 4.21 M/UL
RBC # FLD: 14.2 %
RBC # FLD: 14.6 %
RBC # FLD: 15.1 %
RF+CCP IGG SER-IMP: NEGATIVE
RHEUMATOID FACT SER QL: 17 IU/ML
RHEUMATOID FACT SER QL: 18 IU/ML
SODIUM SERPL-SCNC: 142 MMOL/L
SODIUM SERPL-SCNC: 142 MMOL/L
SODIUM SERPL-SCNC: 143 MMOL/L
SODIUM SERPL-SCNC: 143 MMOL/L
SODIUM SERPL-SCNC: 144 MMOL/L
SODIUM SERPL-SCNC: 144 MMOL/L
T4 FREE SERPL-MCNC: 1.4 NG/DL
T4 SERPL-MCNC: 8.9 UG/DL
TIBC SERPL-MCNC: 277 UG/DL
TRANSFERRIN SERPL-MCNC: 217 MG/DL
TRANSFERRIN SERPL-MCNC: 221 MG/DL
TRIGL SERPL-MCNC: 102 MG/DL
TRIGL SERPL-MCNC: 55 MG/DL
TRIGL SERPL-MCNC: 61 MG/DL
TRIGL SERPL-MCNC: 66 MG/DL
TSH SERPL-ACNC: 1.01 UIU/ML
TSH SERPL-ACNC: 1.27 UIU/ML
TSH SERPL-ACNC: 1.55 UIU/ML
UIBC SERPL-MCNC: 234 UG/DL
URATE SERPL-MCNC: 3.9 MG/DL
VIT B12 SERPL-MCNC: 458 PG/ML
VIT B12 SERPL-MCNC: 588 PG/ML
WBC # FLD AUTO: 7.1 K/UL
WBC # FLD AUTO: 7.25 K/UL
WBC # FLD AUTO: 7.26 K/UL

## 2024-04-18 ENCOUNTER — RX RENEWAL (OUTPATIENT)
Age: 74
End: 2024-04-18

## 2024-04-18 RX ORDER — ALLOPURINOL 100 MG/1
100 TABLET ORAL
Qty: 90 | Refills: 1 | Status: ACTIVE | COMMUNITY
Start: 2023-04-17 | End: 1900-01-01

## 2024-04-28 LAB
ALBUMIN SERPL ELPH-MCNC: 4.1 G/DL
ALP BLD-CCNC: 91 U/L
ALT SERPL-CCNC: 11 U/L
ANION GAP SERPL CALC-SCNC: 13 MMOL/L
ANION GAP SERPL CALC-SCNC: 14 MMOL/L
APPEARANCE: CLEAR
AST SERPL-CCNC: 17 U/L
BACTERIA: NEGATIVE /HPF
BASOPHILS # BLD AUTO: 0.03 K/UL
BASOPHILS NFR BLD AUTO: 0.3 %
BILIRUB DIRECT SERPL-MCNC: 0.2 MG/DL
BILIRUB INDIRECT SERPL-MCNC: 0.3 MG/DL
BILIRUB SERPL-MCNC: 0.4 MG/DL
BILIRUBIN URINE: NEGATIVE
BLOOD URINE: NEGATIVE
BUN SERPL-MCNC: 24 MG/DL
BUN SERPL-MCNC: 28 MG/DL
CALCIUM SERPL-MCNC: 10 MG/DL
CALCIUM SERPL-MCNC: 10.5 MG/DL
CAST: 6 /LPF
CHLORIDE SERPL-SCNC: 105 MMOL/L
CHLORIDE SERPL-SCNC: 105 MMOL/L
CHOLEST SERPL-MCNC: 162 MG/DL
CK SERPL-CCNC: 62 U/L
CO2 SERPL-SCNC: 24 MMOL/L
CO2 SERPL-SCNC: 24 MMOL/L
COLOR: YELLOW
CREAT SERPL-MCNC: 1.09 MG/DL
CREAT SERPL-MCNC: 1.3 MG/DL
EGFR: 43 ML/MIN/1.73M2
EGFR: 53 ML/MIN/1.73M2
EOSINOPHIL # BLD AUTO: 0.14 K/UL
EOSINOPHIL NFR BLD AUTO: 1.6 %
EPITHELIAL CELLS: 2 /HPF
ESTIMATED AVERAGE GLUCOSE: 123 MG/DL
GLUCOSE QUALITATIVE U: NEGATIVE MG/DL
GLUCOSE SERPL-MCNC: 103 MG/DL
GLUCOSE SERPL-MCNC: 88 MG/DL
HBA1C MFR BLD HPLC: 5.9 %
HCT VFR BLD CALC: 38 %
HDLC SERPL-MCNC: 51 MG/DL
HGB BLD-MCNC: 12.6 G/DL
HYALINE CASTS: PRESENT
IMM GRANULOCYTES NFR BLD AUTO: 0.2 %
KETONES URINE: NEGATIVE MG/DL
LDLC SERPL CALC-MCNC: 97 MG/DL
LDLC SERPL DIRECT ASSAY-MCNC: 90 MG/DL
LEUKOCYTE ESTERASE URINE: ABNORMAL
LYMPHOCYTES # BLD AUTO: 2.21 K/UL
LYMPHOCYTES NFR BLD AUTO: 25.2 %
MAGNESIUM SERPL-MCNC: 2 MG/DL
MAGNESIUM SERPL-MCNC: 2.1 MG/DL
MAN DIFF?: NORMAL
MCHC RBC-ENTMCNC: 30.6 PG
MCHC RBC-ENTMCNC: 33.2 GM/DL
MCV RBC AUTO: 92.2 FL
MICROSCOPIC-UA: NORMAL
MONOCYTES # BLD AUTO: 0.61 K/UL
MONOCYTES NFR BLD AUTO: 7 %
NEUTROPHILS # BLD AUTO: 5.75 K/UL
NEUTROPHILS NFR BLD AUTO: 65.7 %
NITRITE URINE: NEGATIVE
NONHDLC SERPL-MCNC: 112 MG/DL
NT-PROBNP SERPL-MCNC: 849 PG/ML
NT-PROBNP SERPL-MCNC: 964 PG/ML
PH URINE: 5.5
PLATELET # BLD AUTO: 279 K/UL
POTASSIUM SERPL-SCNC: 3.8 MMOL/L
POTASSIUM SERPL-SCNC: 4.5 MMOL/L
PROT SERPL-MCNC: 7.8 G/DL
PROTEIN URINE: NEGATIVE MG/DL
RBC # BLD: 4.12 M/UL
RBC # FLD: 13.2 %
RED BLOOD CELLS URINE: 1 /HPF
REVIEW: NORMAL
SODIUM SERPL-SCNC: 142 MMOL/L
SODIUM SERPL-SCNC: 144 MMOL/L
SPECIFIC GRAVITY URINE: 1.01
T4 FREE SERPL-MCNC: 1.5 NG/DL
TRIGL SERPL-MCNC: 79 MG/DL
TSH SERPL-ACNC: 0.93 UIU/ML
UROBILINOGEN URINE: 0.2 MG/DL
WBC # FLD AUTO: 8.76 K/UL
WHITE BLOOD CELLS URINE: 1 /HPF

## 2024-05-11 ENCOUNTER — RX RENEWAL (OUTPATIENT)
Age: 74
End: 2024-05-11

## 2024-05-15 ENCOUNTER — RX RENEWAL (OUTPATIENT)
Age: 74
End: 2024-05-15

## 2024-05-15 RX ORDER — ATORVASTATIN CALCIUM 10 MG/1
10 TABLET, FILM COATED ORAL
Qty: 90 | Refills: 1 | Status: ACTIVE | COMMUNITY
Start: 2024-01-31 | End: 1900-01-01

## 2024-05-17 ENCOUNTER — APPOINTMENT (OUTPATIENT)
Dept: CARDIOLOGY | Facility: CLINIC | Age: 74
End: 2024-05-17
Payer: MEDICARE

## 2024-05-17 VITALS
SYSTOLIC BLOOD PRESSURE: 128 MMHG | HEIGHT: 66 IN | HEART RATE: 86 BPM | TEMPERATURE: 98.1 F | WEIGHT: 256.38 LBS | OXYGEN SATURATION: 98 % | BODY MASS INDEX: 41.2 KG/M2 | RESPIRATION RATE: 17 BRPM | DIASTOLIC BLOOD PRESSURE: 60 MMHG

## 2024-05-17 DIAGNOSIS — E66.9 OBESITY, UNSPECIFIED: ICD-10-CM

## 2024-05-17 DIAGNOSIS — R79.83 ABNORMAL FINDINGS OF BLOOD AMINO-ACID LEVEL: ICD-10-CM

## 2024-05-17 DIAGNOSIS — R73.03 PREDIABETES.: ICD-10-CM

## 2024-05-17 DIAGNOSIS — Z86.79 PERSONAL HISTORY OF OTHER DISEASES OF THE CIRCULATORY SYSTEM: ICD-10-CM

## 2024-05-17 DIAGNOSIS — R06.00 DYSPNEA, UNSPECIFIED: ICD-10-CM

## 2024-05-17 DIAGNOSIS — Z13.228 ENCOUNTER FOR SCREENING FOR OTHER METABOLIC DISORDERS: ICD-10-CM

## 2024-05-17 DIAGNOSIS — E78.5 HYPERLIPIDEMIA, UNSPECIFIED: ICD-10-CM

## 2024-05-17 PROCEDURE — G2211 COMPLEX E/M VISIT ADD ON: CPT

## 2024-05-17 PROCEDURE — 93000 ELECTROCARDIOGRAM COMPLETE: CPT

## 2024-05-17 PROCEDURE — 99214 OFFICE O/P EST MOD 30 MIN: CPT

## 2024-05-17 RX ORDER — KRILL/OM-3/DHA/EPA/PHOSPHO/AST 1000-230MG
81 CAPSULE ORAL
Qty: 90 | Refills: 1 | Status: DISCONTINUED | COMMUNITY
Start: 2024-03-13 | End: 2024-05-17

## 2024-05-17 NOTE — HISTORY OF PRESENT ILLNESS
[FreeTextEntry1] : This is a 72 y/o female with a pmhx of CKD, HTN, chronic DVT, pulm HTN here today for a follow up. She was seen by isabel and pulm and advised for life long AC. She reports dyspnea is improved. Patient still with LE edema. She is doing rehab 2x/weekly to strengthen leg.  Patient denies chest pain. palpitations, dizziness, syncope, changes in bowel/bladder habits or appetite.

## 2024-05-19 ENCOUNTER — NON-APPOINTMENT (OUTPATIENT)
Age: 74
End: 2024-05-19

## 2024-05-20 LAB
ALBUMIN SERPL ELPH-MCNC: 4.2 G/DL
ALP BLD-CCNC: 87 U/L
ALT SERPL-CCNC: 13 U/L
ANION GAP SERPL CALC-SCNC: 12 MMOL/L
APPEARANCE: CLEAR
AST SERPL-CCNC: 15 U/L
BACTERIA: NEGATIVE /HPF
BASOPHILS # BLD AUTO: 0.04 K/UL
BASOPHILS NFR BLD AUTO: 0.5 %
BILIRUB DIRECT SERPL-MCNC: 0.1 MG/DL
BILIRUB INDIRECT SERPL-MCNC: 0.3 MG/DL
BILIRUB SERPL-MCNC: 0.5 MG/DL
BILIRUBIN URINE: NEGATIVE
BLOOD URINE: NEGATIVE
BUN SERPL-MCNC: 30 MG/DL
CALCIUM SERPL-MCNC: 10.1 MG/DL
CAST: 3 /LPF
CHLORIDE SERPL-SCNC: 103 MMOL/L
CHOLEST SERPL-MCNC: 166 MG/DL
CK SERPL-CCNC: 108 U/L
CO2 SERPL-SCNC: 27 MMOL/L
COLOR: YELLOW
CREAT SERPL-MCNC: 1.36 MG/DL
EGFR: 41 ML/MIN/1.73M2
EOSINOPHIL # BLD AUTO: 0.11 K/UL
EOSINOPHIL NFR BLD AUTO: 1.4 %
EPITHELIAL CELLS: 2 /HPF
ESTIMATED AVERAGE GLUCOSE: 117 MG/DL
GLUCOSE QUALITATIVE U: NEGATIVE MG/DL
GLUCOSE SERPL-MCNC: 97 MG/DL
HBA1C MFR BLD HPLC: 5.7 %
HCT VFR BLD CALC: 41.4 %
HDLC SERPL-MCNC: 61 MG/DL
HGB BLD-MCNC: 12.8 G/DL
IMM GRANULOCYTES NFR BLD AUTO: 0.2 %
KETONES URINE: NEGATIVE MG/DL
LDLC SERPL CALC-MCNC: 92 MG/DL
LEUKOCYTE ESTERASE URINE: NEGATIVE
LYMPHOCYTES # BLD AUTO: 1.9 K/UL
LYMPHOCYTES NFR BLD AUTO: 23.3 %
MAGNESIUM SERPL-MCNC: 2.2 MG/DL
MAN DIFF?: NORMAL
MCHC RBC-ENTMCNC: 29.6 PG
MCHC RBC-ENTMCNC: 30.9 GM/DL
MCV RBC AUTO: 95.8 FL
MICROSCOPIC-UA: NORMAL
MONOCYTES # BLD AUTO: 0.56 K/UL
MONOCYTES NFR BLD AUTO: 6.9 %
NEUTROPHILS # BLD AUTO: 5.51 K/UL
NEUTROPHILS NFR BLD AUTO: 67.7 %
NITRITE URINE: NEGATIVE
NONHDLC SERPL-MCNC: 105 MG/DL
PH URINE: 7
PLATELET # BLD AUTO: 250 K/UL
POTASSIUM SERPL-SCNC: 4.4 MMOL/L
PROT SERPL-MCNC: 7.1 G/DL
PROTEIN URINE: NEGATIVE MG/DL
RBC # BLD: 4.32 M/UL
RBC # FLD: 13.6 %
RED BLOOD CELLS URINE: 0 /HPF
SODIUM SERPL-SCNC: 141 MMOL/L
SPECIFIC GRAVITY URINE: 1.01
T4 FREE SERPL-MCNC: 1.6 NG/DL
TRIGL SERPL-MCNC: 67 MG/DL
TSH SERPL-ACNC: 1.11 UIU/ML
UROBILINOGEN URINE: 0.2 MG/DL
WBC # FLD AUTO: 8.14 K/UL
WHITE BLOOD CELLS URINE: 0 /HPF

## 2024-05-21 RX ORDER — POTASSIUM CHLORIDE 750 MG/1
10 TABLET, FILM COATED, EXTENDED RELEASE ORAL
Qty: 60 | Refills: 1 | Status: ACTIVE | COMMUNITY
Start: 2023-05-12 | End: 1900-01-01

## 2024-05-23 ENCOUNTER — APPOINTMENT (OUTPATIENT)
Dept: PULMONOLOGY | Facility: CLINIC | Age: 74
End: 2024-05-23
Payer: MEDICARE

## 2024-05-23 VITALS — HEART RATE: 85 BPM | SYSTOLIC BLOOD PRESSURE: 115 MMHG | DIASTOLIC BLOOD PRESSURE: 68 MMHG | OXYGEN SATURATION: 98 %

## 2024-05-23 DIAGNOSIS — I27.20 PULMONARY HYPERTENSION, UNSPECIFIED: ICD-10-CM

## 2024-05-23 PROCEDURE — ZZZZZ: CPT

## 2024-05-23 PROCEDURE — 94729 DIFFUSING CAPACITY: CPT

## 2024-05-23 PROCEDURE — 94618 PULMONARY STRESS TESTING: CPT

## 2024-05-23 PROCEDURE — 99214 OFFICE O/P EST MOD 30 MIN: CPT | Mod: 25

## 2024-05-23 PROCEDURE — 94010 BREATHING CAPACITY TEST: CPT

## 2024-05-23 PROCEDURE — 94727 GAS DIL/WSHOT DETER LNG VOL: CPT

## 2024-05-23 NOTE — DISCUSSION/SUMMARY
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 Findings are new since 2/3/2021. s/p Pos DVT noted Heme  on Eliquis  not provoked   might need to consider long term AC at lower dose Hypercoag w/u noted re scan CT PA 6 months Pulmonary physiology normalized with interval improvement unprovoked VTE, for which I would recommend lifelong anticoagulation. - Continue Eliquis 5 mg BID. Will plan for at least 6 months of therapeutic dosing, after which we can continue preventative dosing at 2.5 mg BID  Sleep study January 20 and January 21, 2024 revall vendor to get CPAP Mild obstructive sleep apnea Note nonpositional 1% time less than 90% on room air Addressed with patient at office follow-up treatment protocols focus primarily on CPAP based on symptoms set UP  AUTO Set 6-16 cm H20 Goals usage 70 % hours > 4 AHI < 5 f/u 4 - 6 weeks Mild chronic dyspnea better Rule out physical deconditioning with overweight associated No strong evidence for pulmonary parenchymal lung disease stable pul physiology Weight loss walking exercise Follow-up cardiology noted We discussed the issue of sleep apnea management which can contribute to dyspnea although patient states she declines further work-up at present time BUT will agree at today's visit Noted remains on Plaquenil  long-term methotrexate and now OFF recommended COVID vaccine and RSV Pneumonia  vaccine up  to  date Maintain up-to-date cardiology visits

## 2024-05-23 NOTE — HISTORY OF PRESENT ILLNESS
[Never] : never [TextBox_4] : 71-year-old female HEME Chronic deep vein thrombosis (DVT) of right iliac vein (453.51) (I82.521) AUSTIN SILVERIO is a 73 year woman with PMH of pulmonary HTN, HTN, HLD, CKD, RA, LUCIANO, CHF, who presents for Hematology evaluation of unprovoked PE.  # Unprovoked VTE: She saw her primary doctor (Dr. Lorenzo) on 2/22/24 with complaints of RLE swelling and pain. Lower extremity Dopplers showed a non-occlusive DVT in the right common femoral, femoral, and popliteal veins. CT-A chest showed chronic thromboembolic disease involving lateral segment right middle lobe pulmonary arteries. TTE was normal. Repeat Dopplers showed a DVT extending from the right external iliac vein to the calf, and the thrombus in the right common femoral vein appeared mobile. Vascular Surgery performed a thrombectomy on 2/27/24. She is UTD with age-appropriate cancer screening. Thrombophilia work up was overall normal. 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 - Continue Eliquis 5 mg BID. Will plan for at least 6 months of therapeutic dosing, after which we can continue preventative dosing at 2.5 mg BID  f/u sleep evaulation occ dry  cough  no active complaints on RA Resp sxs Patient was diagnosed June 6, 2022 at [facility] emergency department with left lower lobe pneumonia Treatment included Zithromax and Augmentin completed She had some ongoing symptoms feeling weak lightheaded dizzy shortness of breath Urgent care center to the emergency department Reported a chest x-ray with a left lower lobe pneumonia although films not available for review Questionable positive wheeze No sputum production no hemoptysis No fevers chills at present Also had some associated mild atypical chest congestion Significant past medical history rheumatoid arthritis Hyperlipidemia Hypertension Obstructive sleep apnea without active treatment Non-smoker No history of chemical toxic inhalational exposures Patient denies history of a COVID-19 infection Should be noted at the emergency department flu and COVID-19 PCR both negative

## 2024-05-23 NOTE — PROCEDURE
[FreeTextEntry1] : EXAM: CT ANGIO CHEST PULM ART [facility] ORDERED BY: MARTHA OQUENDO PROCEDURE DATE: 02/22/2024 INTERPRETATION: CLINICAL INFORMATION: Dyspnea. DVT. COMPARISON: Coronary CTA February 3, 2021 CONTRAST/COMPLICATIONS: IV Contrast: Omnipaque 350 55 cc administered 45 cc discarded Oral Contrast: NONE Complications: None reported at time of study completion PROCEDURE: CT Angiography of the Chest. Sagittal and coronal reformats were performed as well as 3D (MIP) reconstructions. FINDINGS: LUNGS AND AIRWAYS: Patent central airways. Ovoid right middle lobe groundglass airspace opacity, most consistent with pulmonary infarct. PLEURA: No pleural effusion. MEDIASTINUM AND CLAUS: No lymphadenopathy. VESSELS: Right middle lobe lateral segment pulmonary arteries are occluded and attenuated, likely the sequelae of chronic thromboembolic disease. No additional filling defect identified. HEART: Heart size is normal. No pericardial effusion. Aortic root and mitral annular calcifications. CHEST WALL AND LOWER NECK: Within normal limits. VISUALIZED UPPER ABDOMEN: Diverticulosis. Scattered low-attenuation hepatic lesions too small to accurately characterize. BONES: Degenerative changes. Chronic left anterior lower rib fracture deformities. 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 Findings are new since 2/3/2021. No evidence of additional filling defect within the pulmonary arteries.  Pulmonary 6-minute walk exercise study May 23, 2024 Baseline O2 saturation 97% Ambulation with cane Total distance walked 770 feet No evidence for exercise-induced hypoxemia Normal study on room air.  PFT no bronchodilator May 23, 2024 Indication pulmonary infarct pulmonary embolism abnormal PFT Spirometric flow rates normal range Lung biopsy normal Total lung capacity 53% predicted No air-trapping Diffusion normal range 77% predicted Hemoglobin 12.8 Overall stable flow rates with interval improvement at the diffusion and total lung capacity  PFT  3/28/24 rufina nl flow rates lung volumes  nl DLCO 68 % mild reduction gas exchange impairment HGB 12.4  Sleep study January 20 and January 21, 2024 Mild obstructive sleep apnea Note nonpositional 1% time less than 90% on room air Addressed with patient at office follow-up treatment protocols focus primarily on CPAP based on symptoms  PFT September 28, 2023 Flow rates normal range Ratio 77 Lung volumes normal TLC 95% predicted Diffusion 72% predicted which is normal range Hemoglobin 12.4 Stable pulmonary physiology  Pulmonary 6-minute walk exercise study Indication exertional dyspnea Baseline O2 saturation 98% Negative desaturation to 90% This does not qualify for portable oxygen therapy   PFT June 30, 2023 Flow rates normal FEV1 94% predicted Lung volumes normal TLC 23% predicted Decreased ERV secondary to truncal obesity Diffusion normal range 75% predicted Hemoglobin 12.4 Overall stable pulmonary physiology  Chest x-ray PA lateral June 30, 2023 Borderline cardiomegaly Lung fields are clear No parenchymal infiltrates pleural effusions dominant pulmonary nodules Soft tissue bony structures unremarkable Mediastinum unremarkable Lamination right hemidiaphragm No interval change dating back to chest x-ray June 16, 2022  PFT 10/12/22 flow rates nl Lung Volumes nl no  airtrapping  DLCO 85 % WNL  resistance and sp conductance nl stable pulmonary physiology  PFT no bronchodilator June 16, 2022 Flow rates normal Lung volumes normal TLC 93% predicted Diffusion 67% of predicted with a mild loss of functioning alveolar capillary units Clinical correlation  Chest x-ray PA lateral June 16, 2022 reported left lower lobe pneumonia No prior films available for review Globular cardiac size but not grossly enlarged borderline Lamination right hemidiaphragm No evidence of infiltrates bilateral Left lower lobe clear  High-dose flu vaccine September 28, 2023

## 2024-06-14 ENCOUNTER — APPOINTMENT (OUTPATIENT)
Dept: INTERNAL MEDICINE | Facility: CLINIC | Age: 74
End: 2024-06-14
Payer: MEDICARE

## 2024-06-14 ENCOUNTER — LABORATORY RESULT (OUTPATIENT)
Age: 74
End: 2024-06-14

## 2024-06-14 VITALS
SYSTOLIC BLOOD PRESSURE: 110 MMHG | DIASTOLIC BLOOD PRESSURE: 70 MMHG | OXYGEN SATURATION: 98 % | WEIGHT: 254 LBS | HEIGHT: 66 IN | BODY MASS INDEX: 40.82 KG/M2 | HEART RATE: 77 BPM

## 2024-06-14 DIAGNOSIS — M06.9 RHEUMATOID ARTHRITIS, UNSPECIFIED: ICD-10-CM

## 2024-06-14 DIAGNOSIS — I10 ESSENTIAL (PRIMARY) HYPERTENSION: ICD-10-CM

## 2024-06-14 DIAGNOSIS — N18.9 CHRONIC KIDNEY DISEASE, UNSPECIFIED: ICD-10-CM

## 2024-06-14 DIAGNOSIS — R60.0 LOCALIZED EDEMA: ICD-10-CM

## 2024-06-14 PROCEDURE — G2211 COMPLEX E/M VISIT ADD ON: CPT

## 2024-06-14 PROCEDURE — 99213 OFFICE O/P EST LOW 20 MIN: CPT

## 2024-06-14 RX ORDER — SPIRONOLACTONE 25 MG/1
25 TABLET ORAL
Qty: 90 | Refills: 1 | Status: ACTIVE | COMMUNITY
Start: 2023-07-17

## 2024-06-14 NOTE — HISTORY OF PRESENT ILLNESS
[FreeTextEntry1] : f/u and discuss meds  [de-identified] : This is a 74 y/o female with a pmhx of CKD, HTN, chronic DVT, pulm HTN here today for follow up.  Says leg swelling improved on lasix 40mg bid and aldactone 25mg daily Patient feels well. No complaints. Denies chest pain, sob, jordan, dizziness, orthopnea, diaphoresis, palpitations, LE swelling, syncope, n/v, headache.

## 2024-06-14 NOTE — PHYSICAL EXAM
[No Acute Distress] : no acute distress [Well Nourished] : well nourished [Well Developed] : well developed [Well-Appearing] : well-appearing [Normal Sclera/Conjunctiva] : normal sclera/conjunctiva [PERRL] : pupils equal round and reactive to light [EOMI] : extraocular movements intact [Normal Outer Ear/Nose] : the outer ears and nose were normal in appearance [Normal Oropharynx] : the oropharynx was normal [No JVD] : no jugular venous distention [No Lymphadenopathy] : no lymphadenopathy [Supple] : supple [Thyroid Normal, No Nodules] : the thyroid was normal and there were no nodules present [No Respiratory Distress] : no respiratory distress  [No Accessory Muscle Use] : no accessory muscle use [Clear to Auscultation] : lungs were clear to auscultation bilaterally [Normal Rate] : normal rate  [Regular Rhythm] : with a regular rhythm [Normal S1, S2] : normal S1 and S2 [No Murmur] : no murmur heard [No Carotid Bruits] : no carotid bruits [No Varicosities] : no varicosities [Pedal Pulses Present] : the pedal pulses are present [No Extremity Clubbing/Cyanosis] : no extremity clubbing/cyanosis [Soft] : abdomen soft [Non Tender] : non-tender [Non-distended] : non-distended [Normal Bowel Sounds] : normal bowel sounds [Normal Posterior Cervical Nodes] : no posterior cervical lymphadenopathy [Normal Anterior Cervical Nodes] : no anterior cervical lymphadenopathy [No CVA Tenderness] : no CVA  tenderness [No Spinal Tenderness] : no spinal tenderness [No Joint Swelling] : no joint swelling [Grossly Normal Strength/Tone] : grossly normal strength/tone [No Rash] : no rash [Coordination Grossly Intact] : coordination grossly intact [No Focal Deficits] : no focal deficits [Normal Gait] : normal gait [Normal Affect] : the affect was normal [Normal Insight/Judgement] : insight and judgment were intact [No Edema] : there was no peripheral edema [Alert and Oriented x3] : oriented to person, place, and time

## 2024-06-14 NOTE — HEALTH RISK ASSESSMENT
[0] : 2) Feeling down, depressed, or hopeless: Not at all (0) [PHQ-2 Negative - No further assessment needed] : PHQ-2 Negative - No further assessment needed [Never] : Never [[id number]] : 0

## 2024-06-14 NOTE — ADDENDUM
[FreeTextEntry1] : This note was written by Thien Sibley on 06/14/2024 acting as medical scribe for Dr. Irma Lorenzo. I, Dr. Irma Lorenzo, have read and attest that all the information, medical decision making and discharge instructions within are true and accurate.

## 2024-06-17 LAB — NT-PROBNP SERPL-MCNC: 544 PG/ML

## 2024-06-19 ENCOUNTER — NON-APPOINTMENT (OUTPATIENT)
Age: 74
End: 2024-06-19

## 2024-06-19 RX ORDER — FUROSEMIDE 40 MG/1
40 TABLET ORAL
Refills: 0 | Status: ACTIVE | COMMUNITY
Start: 2023-05-12

## 2024-06-26 ENCOUNTER — APPOINTMENT (OUTPATIENT)
Dept: VASCULAR SURGERY | Facility: CLINIC | Age: 74
End: 2024-06-26
Payer: MEDICARE

## 2024-06-26 VITALS
HEART RATE: 86 BPM | DIASTOLIC BLOOD PRESSURE: 76 MMHG | BODY MASS INDEX: 40.98 KG/M2 | SYSTOLIC BLOOD PRESSURE: 153 MMHG | HEIGHT: 66 IN | WEIGHT: 255 LBS | TEMPERATURE: 97.9 F

## 2024-06-26 DIAGNOSIS — I83.893 VARICOSE VEINS OF BILATERAL LOWER EXTREMITIES WITH OTHER COMPLICATIONS: ICD-10-CM

## 2024-06-26 DIAGNOSIS — I87.2 VENOUS INSUFFICIENCY (CHRONIC) (PERIPHERAL): ICD-10-CM

## 2024-06-26 LAB
ANION GAP SERPL CALC-SCNC: 14 MMOL/L
BUN SERPL-MCNC: 28 MG/DL
CALCIUM SERPL-MCNC: 10 MG/DL
CHLORIDE SERPL-SCNC: 106 MMOL/L
CO2 SERPL-SCNC: 23 MMOL/L
CREAT SERPL-MCNC: 1.28 MG/DL
EGFR: 43 ML/MIN/1.73M2
GLUCOSE SERPL-MCNC: 95 MG/DL
POTASSIUM SERPL-SCNC: 4.3 MMOL/L
SODIUM SERPL-SCNC: 143 MMOL/L

## 2024-06-26 PROCEDURE — 93970 EXTREMITY STUDY: CPT

## 2024-06-26 PROCEDURE — 99213 OFFICE O/P EST LOW 20 MIN: CPT

## 2024-06-27 ENCOUNTER — APPOINTMENT (OUTPATIENT)
Dept: PULMONOLOGY | Facility: CLINIC | Age: 74
End: 2024-06-27
Payer: MEDICARE

## 2024-06-27 VITALS — OXYGEN SATURATION: 96 % | DIASTOLIC BLOOD PRESSURE: 74 MMHG | HEART RATE: 79 BPM | SYSTOLIC BLOOD PRESSURE: 137 MMHG

## 2024-06-27 DIAGNOSIS — I26.99 OTHER PULMONARY EMBOLISM W/OUT ACUTE COR PULMONALE: ICD-10-CM

## 2024-06-27 DIAGNOSIS — G47.33 OBSTRUCTIVE SLEEP APNEA (ADULT) (PEDIATRIC): ICD-10-CM

## 2024-06-27 DIAGNOSIS — I82.521 CHRONIC EMBOLISM AND THROMBOSIS OF RIGHT ILIAC VEIN: ICD-10-CM

## 2024-06-27 PROBLEM — I87.2 CHRONIC VENOUS INSUFFICIENCY: Status: ACTIVE | Noted: 2024-06-27

## 2024-06-27 PROBLEM — I83.893 SYMPTOMATIC VARICOSE VEINS OF BOTH LOWER EXTREMITIES: Status: ACTIVE | Noted: 2024-06-27

## 2024-06-27 PROCEDURE — 99213 OFFICE O/P EST LOW 20 MIN: CPT

## 2024-06-27 PROCEDURE — G2211 COMPLEX E/M VISIT ADD ON: CPT

## 2024-07-01 ENCOUNTER — APPOINTMENT (OUTPATIENT)
Dept: ENDOCRINOLOGY | Facility: CLINIC | Age: 74
End: 2024-07-01

## 2024-07-12 ENCOUNTER — NON-APPOINTMENT (OUTPATIENT)
Age: 74
End: 2024-07-12

## 2024-07-12 ENCOUNTER — APPOINTMENT (OUTPATIENT)
Dept: CARDIOLOGY | Facility: CLINIC | Age: 74
End: 2024-07-12
Payer: MEDICARE

## 2024-07-12 VITALS
SYSTOLIC BLOOD PRESSURE: 140 MMHG | OXYGEN SATURATION: 97 % | HEART RATE: 91 BPM | DIASTOLIC BLOOD PRESSURE: 72 MMHG | WEIGHT: 262 LBS | BODY MASS INDEX: 42.11 KG/M2 | HEIGHT: 66 IN

## 2024-07-12 DIAGNOSIS — E78.5 HYPERLIPIDEMIA, UNSPECIFIED: ICD-10-CM

## 2024-07-12 DIAGNOSIS — I25.10 ATHEROSCLEROTIC HEART DISEASE OF NATIVE CORONARY ARTERY W/OUT ANGINA PECTORIS: ICD-10-CM

## 2024-07-12 DIAGNOSIS — E66.01 MORBID (SEVERE) OBESITY DUE TO EXCESS CALORIES: ICD-10-CM

## 2024-07-12 DIAGNOSIS — I49.3 VENTRICULAR PREMATURE DEPOLARIZATION: ICD-10-CM

## 2024-07-12 DIAGNOSIS — R73.03 PREDIABETES.: ICD-10-CM

## 2024-07-12 PROCEDURE — G2211 COMPLEX E/M VISIT ADD ON: CPT

## 2024-07-12 PROCEDURE — 99214 OFFICE O/P EST MOD 30 MIN: CPT

## 2024-07-12 PROCEDURE — 93000 ELECTROCARDIOGRAM COMPLETE: CPT

## 2024-07-12 RX ORDER — SEMAGLUTIDE 0.68 MG/ML
2 INJECTION, SOLUTION SUBCUTANEOUS
Qty: 1 | Refills: 0 | Status: ACTIVE | COMMUNITY
Start: 2024-07-12 | End: 1900-01-01

## 2024-07-12 RX ORDER — SEMAGLUTIDE 0.25 MG/.5ML
0.25 INJECTION, SOLUTION SUBCUTANEOUS
Qty: 1 | Refills: 1 | Status: ACTIVE | COMMUNITY
Start: 2024-07-12 | End: 1900-01-01

## 2024-07-12 RX ORDER — METFORMIN HYDROCHLORIDE 625 MG/1
625 TABLET ORAL
Qty: 30 | Refills: 0 | Status: ACTIVE | COMMUNITY
Start: 2024-07-12 | End: 1900-01-01

## 2024-07-31 DIAGNOSIS — I83.893 VARICOSE VEINS OF BILATERAL LOWER EXTREMITIES WITH OTHER COMPLICATIONS: ICD-10-CM

## 2024-07-31 RX ORDER — LIDOCAINE HYDROCHLORIDE 10 MG/ML
1 INJECTION, SOLUTION INFILTRATION; PERINEURAL
Qty: 50 | Refills: 0 | Status: COMPLETED | COMMUNITY
Start: 2024-07-31 | End: 2024-08-01

## 2024-08-01 ENCOUNTER — APPOINTMENT (OUTPATIENT)
Dept: VASCULAR SURGERY | Facility: CLINIC | Age: 74
End: 2024-08-01
Payer: MEDICARE

## 2024-08-01 PROCEDURE — 36475 ENDOVENOUS RF 1ST VEIN: CPT | Mod: RT

## 2024-08-01 NOTE — PROCEDURE
[FreeTextEntry1] : right GSV RFA [FreeTextEntry3] : Procedural safety checklist and time out completed: Confirmed patient identification (Patient Name, , and/or medical record number including, when possible, affirmation by patient or parent/family/other). Confirmed procedure with the patient. Consent present, accurate and signed. Confirmed special equipment and supplies are present. Sterility confirmed. Position verified. Site/ side is marked and visible and confirmed. Procedure confirmed by consent. Accurate consent including side and site. Review of medical records, including venous ultrasound, noting correct procedure including site and side. MD/PA verifies presence and review of imaging studies and or written report of imaging studies. Agreement on the procedure to be performed. Time out completed. All of the above has been confirmed by the team. All patient-specific concerns have been addressed.   Indication: right lower extremity varicose veins with inflammation, leg pain, leg swelling, and leg cramping.  Venous insufficiency/ reflux.   Procedure: radiofrequency ablation of the right great saphenous vein.   Ms. AUSTIN SILVERIO is a 74 year old F with a history of right lower extremity varicose veins previously seen in the office.  Ultrasound examination demonstrated venous insufficiency. A trial of compression stockings, exercise, elevation, and pain medication was attempted without relief and definitive treatment with radiofrequency ablation was offered.   The patient has come for radiofrequency ablation treatment of the right great saphenous vein. I have discussed the risks of the procedure at length with the patient. The risks discussed were inclusive of but not limited to infection, irritation at the site of infiltration of local anesthesia, and also rare risk of deep venous thrombosis and pulmonary emboli. The patient agrees to proceed with the procedure. The patient was escorted into the procedure room and a time out called. The entire limb was prepped and draped in sterile fashion. The RF fiber was placed on the sterile field and connected by a sterile cable. Actuation, temperature, and impedance testing were performed to ensure that all components were connected and operating properly. The patient was placed on the procedure table and local anesthesia was instilled in the skin overlying the access site. Under ultrasound guidance, the vein was punctured with a micropuncture needle, using the anterior wall technique. A guide wire was now introduced through the needle, and the needle was then exchanged over the guide wire for a 6F sheath. The guide wire was removed, and the RF probe was then placed into the saphenous vein through the sheath and position confirmed using ultrasound guidance. After the RF probe position was verified by ultrasound, tumescent anesthesia consisting of normal saline and1% lidocaine was infiltrated, under ultrasound guidance, precisely into the perivenous compartment along the entire length of the vein until a halo of fluid was noted around the vein. After RF probe position was again confirmed with ultrasound imaging, RF energy was applied. The probe was gradually and carefully withdrawn at a rate of 6.5cm/20seconds.   7 cycles of RF performed using the 8 cm probe. Total treatment time was 140 seconds. The total volume injected was 450 cc. Treatment length was 45.5 cm and The probe is 3.5 cm from the SFJ.   Estimated Blood Loss: minimal. Repeat ultrasound of the treated vein was performed confirming successful treatment. The catheter and sheath were withdrawn, and hemostasis established with direct pressure. After assuring hemostasis, a sterile 4x4 was placed on the access site and an ACE compression wrap was applied. Patient tolerated procedure well. Patient was given post-procedure instructions and follow up appointment was scheduled.

## 2024-08-08 ENCOUNTER — APPOINTMENT (OUTPATIENT)
Dept: VASCULAR SURGERY | Facility: CLINIC | Age: 74
End: 2024-08-08

## 2024-08-08 PROCEDURE — 93971 EXTREMITY STUDY: CPT

## 2024-08-20 DIAGNOSIS — Z12.39 ENCOUNTER FOR OTHER SCREENING FOR MALIGNANT NEOPLASM OF BREAST: ICD-10-CM

## 2024-08-20 DIAGNOSIS — Z88.9 ALLERGY STATUS TO UNSPECIFIED DRUGS, MEDICAMENTS AND BIOLOGICAL SUBSTANCES: ICD-10-CM

## 2024-08-20 DIAGNOSIS — Z00.00 ENCOUNTER FOR GENERAL ADULT MEDICAL EXAMINATION W/OUT ABNORMAL FINDINGS: ICD-10-CM

## 2024-08-20 DIAGNOSIS — R89.9 UNSPECIFIED ABNORMAL FINDING IN SPECIMENS FROM OTHER ORGANS, SYSTEMS AND TISSUES: ICD-10-CM

## 2024-08-20 DIAGNOSIS — E66.3 OVERWEIGHT: ICD-10-CM

## 2024-08-20 DIAGNOSIS — Z13.228 ENCOUNTER FOR SCREENING FOR OTHER METABOLIC DISORDERS: ICD-10-CM

## 2024-08-20 DIAGNOSIS — Z71.89 OTHER SPECIFIED COUNSELING: ICD-10-CM

## 2024-08-20 DIAGNOSIS — Z13.820 ENCOUNTER FOR SCREENING FOR OSTEOPOROSIS: ICD-10-CM

## 2024-08-20 DIAGNOSIS — J18.9 PNEUMONIA, UNSPECIFIED ORGANISM: ICD-10-CM

## 2024-08-20 DIAGNOSIS — Z04.9 ENCOUNTER FOR EXAMINATION AND OBSERVATION FOR UNSPECIFIED REASON: ICD-10-CM

## 2024-08-20 DIAGNOSIS — R82.90 UNSPECIFIED ABNORMAL FINDINGS IN URINE: ICD-10-CM

## 2024-08-20 DIAGNOSIS — E66.9 OVERWEIGHT: ICD-10-CM

## 2024-08-20 DIAGNOSIS — Z01.419 ENCOUNTER FOR GYNECOLOGICAL EXAMINATION (GENERAL) (ROUTINE) W/OUT ABNORMAL FINDINGS: ICD-10-CM

## 2024-08-20 DIAGNOSIS — Z86.39 PERSONAL HISTORY OF OTHER ENDOCRINE, NUTRITIONAL AND METABOLIC DISEASE: ICD-10-CM

## 2024-08-20 DIAGNOSIS — Z71.85 ENCOUNTER FOR IMMUNIZATION SAFETY COUNSELING: ICD-10-CM

## 2024-08-20 DIAGNOSIS — Z28.21 IMMUNIZATION NOT CARRIED OUT BECAUSE OF PATIENT REFUSAL: ICD-10-CM

## 2024-08-20 DIAGNOSIS — Z86.79 PERSONAL HISTORY OF OTHER DISEASES OF THE CIRCULATORY SYSTEM: ICD-10-CM

## 2024-08-20 DIAGNOSIS — Z12.11 ENCOUNTER FOR SCREENING FOR MALIGNANT NEOPLASM OF COLON: ICD-10-CM

## 2024-08-20 DIAGNOSIS — Z23 ENCOUNTER FOR IMMUNIZATION: ICD-10-CM

## 2024-08-20 DIAGNOSIS — Z03.89 ENCOUNTER FOR OBSERVATION FOR OTHER SUSPECTED DISEASES AND CONDITIONS RULED OUT: ICD-10-CM

## 2024-08-22 ENCOUNTER — NON-APPOINTMENT (OUTPATIENT)
Age: 74
End: 2024-08-22

## 2024-08-22 ENCOUNTER — APPOINTMENT (OUTPATIENT)
Dept: PULMONOLOGY | Facility: CLINIC | Age: 74
End: 2024-08-22
Payer: MEDICARE

## 2024-08-22 VITALS — OXYGEN SATURATION: 97 % | DIASTOLIC BLOOD PRESSURE: 69 MMHG | HEART RATE: 76 BPM | SYSTOLIC BLOOD PRESSURE: 120 MMHG

## 2024-08-22 DIAGNOSIS — M06.9 RHEUMATOID ARTHRITIS, UNSPECIFIED: ICD-10-CM

## 2024-08-22 DIAGNOSIS — G47.33 OBSTRUCTIVE SLEEP APNEA (ADULT) (PEDIATRIC): ICD-10-CM

## 2024-08-22 DIAGNOSIS — I26.99 OTHER PULMONARY EMBOLISM W/OUT ACUTE COR PULMONALE: ICD-10-CM

## 2024-08-22 DIAGNOSIS — I82.521 CHRONIC EMBOLISM AND THROMBOSIS OF RIGHT ILIAC VEIN: ICD-10-CM

## 2024-08-22 LAB
BASOPHILS # BLD AUTO: 0.05 K/UL
BASOPHILS NFR BLD AUTO: 0.6 %
DEPRECATED D DIMER PPP IA-ACNC: 203 NG/ML DDU
EOSINOPHIL # BLD AUTO: 0.31 K/UL
EOSINOPHIL NFR BLD AUTO: 3.6 %
HCT VFR BLD CALC: 40 %
HGB BLD-MCNC: 12.4 G/DL
IMM GRANULOCYTES NFR BLD AUTO: 0.3 %
LYMPHOCYTES # BLD AUTO: 2.12 K/UL
LYMPHOCYTES NFR BLD AUTO: 24.3 %
MAN DIFF?: NORMAL
MCHC RBC-ENTMCNC: 30 PG
MCHC RBC-ENTMCNC: 31 GM/DL
MCV RBC AUTO: 96.9 FL
MONOCYTES # BLD AUTO: 0.56 K/UL
MONOCYTES NFR BLD AUTO: 6.4 %
NEUTROPHILS # BLD AUTO: 5.64 K/UL
NEUTROPHILS NFR BLD AUTO: 64.8 %
PLATELET # BLD AUTO: 256 K/UL
RBC # BLD: 4.13 M/UL
RBC # FLD: 13.4 %
WBC # FLD AUTO: 8.71 K/UL

## 2024-08-22 PROCEDURE — 99214 OFFICE O/P EST MOD 30 MIN: CPT | Mod: 25

## 2024-08-22 PROCEDURE — G2211 COMPLEX E/M VISIT ADD ON: CPT | Mod: NC

## 2024-08-22 PROCEDURE — 36415 COLL VENOUS BLD VENIPUNCTURE: CPT

## 2024-08-22 NOTE — REASON FOR VISIT
[Follow-Up - From Hospitalization] : a follow-up visit after a recent hospitalization [TextBox_44] : Mild pulmonary hypertension, dyspnea, PE and DVT, LUCIANO

## 2024-08-22 NOTE — PHYSICAL EXAM
[No Acute Distress] : no acute distress [Low Lying Soft Palate] : low lying soft palate [IV] : Mallampati Class: IV [Normal Appearance] : normal appearance [Supple] : supple [No Neck Mass] : no neck mass [No JVD] : no jvd [Normal Rate/Rhythm] : normal rate/rhythm [Normal S1, S2] : normal s1, s2 [No Murmurs] : no murmurs [No Rubs] : no rubs [No Gallops] : no gallops [No Resp Distress] : no resp distress [No Acc Muscle Use] : no acc muscle use [Normal Palpation] : normal palpation [Normal Rhythm and Effort] : normal rhythm and effort [Clear to Auscultation Bilaterally] : clear to auscultation bilaterally [Normal to Percussion] : normal to percussion [Benign] : benign [Not Tender] : not tender [No HSM] : no hsm [Soft] : soft [Normal Bowel Sounds] : normal bowel sounds [No Clubbing] : no clubbing [No Cyanosis] : no cyanosis [No Edema] : no edema [FROM] : FROM [1+ Pitting] : 1+ pitting [No Focal Deficits] : no focal deficits [Oriented x3] : oriented x3 [Normal Affect] : normal affect [TextBox_80] : ambulates with cane

## 2024-08-22 NOTE — PROCEDURE
[FreeTextEntry1] : CPAP data adherence compliance thru 8/18/24 usage 77 % hours just < 4  AUTO set 6-16 cm h20  AHI 10.2 pos mask leak  CPAP visit with Resp  EXAM: CT ANGIO CHEST PULM [facility] ORDERED BY: MARTHA OQUENDO PROCEDURE DATE: 02/22/2024 INTERPRETATION: CLINICAL INFORMATION: Dyspnea. DVT. COMPARISON: Coronary CTA February 3, 2021 CONTRAST/COMPLICATIONS: IV Contrast: Omnipaque 350 55 cc administered 45 cc discarded Oral Contrast: NONE Complications: None reported at time of study completion PROCEDURE: CT Angiography of the Chest. Sagittal and coronal reformats were performed as well as 3D (MIP) reconstructions. FINDINGS: LUNGS AND AIRWAYS: Patent central airways. Ovoid right middle lobe groundglass airspace opacity, most consistent with pulmonary infarct. PLEURA: No pleural effusion. MEDIASTINUM AND CLAUS: No lymphadenopathy. VESSELS: Right middle lobe lateral segment pulmonary arteries are occluded and attenuated, likely the sequelae of chronic thromboembolic disease. No additional filling defect identified. HEART: Heart size is normal. No pericardial effusion. Aortic root and mitral annular calcifications. CHEST WALL AND LOWER NECK: Within normal limits. VISUALIZED UPPER ABDOMEN: Diverticulosis. Scattered low-attenuation hepatic lesions too small to accurately characterize. BONES: Degenerative changes. Chronic left anterior lower rib fracture deformities. 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 Findings are new since 2/3/2021. No evidence of additional filling defect within the pulmonary arteries.  Pulmonary 6-minute walk exercise study May 23, 2024 Baseline O2 saturation 97% Ambulation with cane Total distance walked 770 feet No evidence for exercise-induced hypoxemia Normal study on room air.  PFT no bronchodilator May 23, 2024 Indication pulmonary infarct pulmonary embolism abnormal PFT Spirometric flow rates normal range Lung volumes  normal Total lung capacity 53% predicted No air-trapping Diffusion normal range 77% predicted Hemoglobin 12.8 Overall stable flow rates with interval improvement at the diffusion and total lung capacity  PFT  3/28/24 rufina nl flow rates lung volumes  nl DLCO 68 % mild reduction gas exchange impairment HGB 12.4  Sleep study January 20 and January 21, 2024 Mild obstructive sleep apnea Note nonpositional 1% time less than 90% on room air Addressed with patient at office follow-up treatment protocols focus primarily on CPAP based on symptoms  PFT September 28, 2023 Flow rates normal range Ratio 77 Lung volumes normal TLC 95% predicted Diffusion 72% predicted which is normal range Hemoglobin 12.4 Stable pulmonary physiology  Pulmonary 6-minute walk exercise study Indication exertional dyspnea Baseline O2 saturation 98% Negative desaturation to 90% This does not qualify for portable oxygen therapy   PFT June 30, 2023 Flow rates normal FEV1 94% predicted Lung volumes normal TLC 23% predicted Decreased ERV secondary to truncal obesity Diffusion normal range 75% predicted Hemoglobin 12.4 Overall stable pulmonary physiology  Chest x-ray PA lateral June 30, 2023 Borderline cardiomegaly Lung fields are clear No parenchymal infiltrates pleural effusions dominant pulmonary nodules Soft tissue bony structures unremarkable Mediastinum unremarkable Lamination right hemidiaphragm No interval change dating back to chest x-ray June 16, 2022  PFT 10/12/22 flow rates nl Lung Volumes nl no  airtrapping  DLCO 85 % WNL  resistance and sp conductance nl stable pulmonary physiology  PFT no bronchodilator June 16, 2022 Flow rates normal Lung volumes normal TLC 93% predicted Diffusion 67% of predicted with a mild loss of functioning alveolar capillary units Clinical correlation  Chest x-ray PA lateral June 16, 2022 reported left lower lobe pneumonia No prior films available for review Globular cardiac size but not grossly enlarged borderline Lamination right hemidiaphragm No evidence of infiltrates bilateral Left lower lobe clear  High-dose flu vaccine September 28, 2023

## 2024-08-22 NOTE — HISTORY OF PRESENT ILLNESS
[Never] : never [TextBox_4] : 71-year-old female HEME Chronic deep vein thrombosis (DVT) of right iliac vein (453.51) (I82.521) AUSTIN SILVERIO is a 73 year woman with PMH of pulmonary HTN, HTN, HLD, CKD, RA, LUCIANO, CHF, who presents for Hematology evaluation of unprovoked PE.  # Unprovoked VTE: She saw her primary doctor (Dr. Lorenzo) on 2/22/24 with complaints of RLE swelling and pain. Lower extremity Dopplers showed a non-occlusive DVT in the right common femoral, femoral, and popliteal veins. CT-A chest showed chronic thromboembolic disease involving lateral segment right middle lobe pulmonary arteries. TTE was normal. Repeat Dopplers showed a DVT extending from the right external iliac vein to the calf, and the thrombus in the right common femoral vein appeared mobile. Vascular Surgery performed a thrombectomy on 2/27/24. She is UTD with age-appropriate cancer screening. Thrombophilia work up was overall normal. 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 - Continue Eliquis 5 mg BID. Will plan for at least 6 months of therapeutic dosing, after which we can continue preventative dosing at 2.5 mg BID  f/u sleep evaulation occ dry  cough  no active complaints on RA Resp sxs Patient was diagnosed June 6, 2022 at [facility] emergency department with left lower lobe pneumonia Treatment included Zithromax and Augmentin completed She had some ongoing symptoms feeling weak lightheaded dizzy shortness of breath Urgent care center to the emergency department Reported a chest x-ray with a left lower lobe pneumonia although films not available for review Questionable positive wheeze No sputum production no hemoptysis No fevers chills at present Also had some associated mild atypical chest congestion Significant past medical history rheumatoid arthritis Hyperlipidemia Hypertension Obstructive sleep apnea without active treatment Non-smoker No history of chemical toxic inhalational exposures Patient denies history of a COVID-19 infection Should be noted at the emergency department flu and COVID-19 PCR both negative

## 2024-08-22 NOTE — DISCUSSION/SUMMARY
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 Findings are new since 2/3/2021. s/p Pos DVT noted Heme  on Eliquis  not provoked   might need to consider long term AC at lower dose Hypercoag w/u noted re scan CT PA 6 months August 2024  Pulmonary physiology normalized with interval improvement unprovoked VTE, for which I would recommend lifelong anticoagulation. - Continue Eliquis 5 mg BID. Will plan for at least 6 months of therapeutic dosing, after which we can continue preventative dosing at 2.5 mg BID  Sleep study January 20 and January 21, 2024 revall vendor to get CPAP- mask fit at f/u Mild obstructive sleep apnea Note nonpositional 1% time less than 90% on room air Addressed with patient at office follow-up treatment protocols focus primarily on CPAP based on symptoms set UP  AUTO Set 6-16 cm H20 Goals usage 70 % hours > 4 AHI < 5 f/u 4 - 6 weeks Mild chronic dyspnea better Rule out physical deconditioning with overweight associated No strong evidence for pulmonary parenchymal lung disease stable pul physiology Weight loss walking exercise Follow-up cardiology noted We discussed the issue of sleep apnea management which can contribute to dyspnea although patient states she declines further work-up at present time BUT will agree at today's visit Noted remains on Plaquenil  long-term methotrexate and now OFF recommended COVID vaccine and RSV Pneumonia  vaccine up  to  date Maintain up-to-date cardiology visits

## 2024-08-22 NOTE — REVIEW OF SYSTEMS
[Fatigue] : fatigue [Seasonal Allergies] : seasonal allergies [Negative] : Neurologic [Fever] : no fever [Chills] : no chills [Poor Appetite] : no poor appetite [Angioedema] : no angioedema [Depression] : no depression [Anxiety] : no anxiety [TextBox_30] : HPI [TextBox_44] : HTN, HLD [TextBox_94] : Pos RA, Gout [TextBox_83] : CKI [TextBox_113] : Folic acid def [TextBox_144] : Hypercalcemia

## 2024-08-23 LAB
ANION GAP SERPL CALC-SCNC: 15 MMOL/L
BUN SERPL-MCNC: 22 MG/DL
CALCIUM SERPL-MCNC: 10.1 MG/DL
CHLORIDE SERPL-SCNC: 105 MMOL/L
CO2 SERPL-SCNC: 24 MMOL/L
CREAT SERPL-MCNC: 1.53 MG/DL
EGFR: 35 ML/MIN/1.73M2
GLUCOSE SERPL-MCNC: 90 MG/DL
POTASSIUM SERPL-SCNC: 4.2 MMOL/L
SODIUM SERPL-SCNC: 144 MMOL/L

## 2024-08-30 RX ORDER — ALPRAZOLAM 0.25 MG/1
0.25 TABLET ORAL
Qty: 1 | Refills: 0 | Status: ACTIVE | COMMUNITY
Start: 2024-08-30 | End: 1900-01-01

## 2024-08-30 RX ORDER — LIDOCAINE HYDROCHLORIDE 10 MG/ML
1 INJECTION, SOLUTION INFILTRATION; PERINEURAL
Qty: 50 | Refills: 0 | Status: ACTIVE | COMMUNITY
Start: 2024-08-30 | End: 1900-01-01

## 2024-08-31 ENCOUNTER — RX RENEWAL (OUTPATIENT)
Age: 74
End: 2024-08-31

## 2024-09-04 ENCOUNTER — APPOINTMENT (OUTPATIENT)
Dept: HEMATOLOGY ONCOLOGY | Facility: CLINIC | Age: 74
End: 2024-09-04
Payer: MEDICARE

## 2024-09-04 VITALS
SYSTOLIC BLOOD PRESSURE: 120 MMHG | HEART RATE: 74 BPM | OXYGEN SATURATION: 99 % | RESPIRATION RATE: 18 BRPM | WEIGHT: 257.94 LBS | DIASTOLIC BLOOD PRESSURE: 73 MMHG | BODY MASS INDEX: 41.63 KG/M2 | TEMPERATURE: 97.2 F

## 2024-09-04 DIAGNOSIS — I82.521 CHRONIC EMBOLISM AND THROMBOSIS OF RIGHT ILIAC VEIN: ICD-10-CM

## 2024-09-04 PROCEDURE — 99214 OFFICE O/P EST MOD 30 MIN: CPT

## 2024-09-04 PROCEDURE — G2211 COMPLEX E/M VISIT ADD ON: CPT

## 2024-09-04 NOTE — HISTORY OF PRESENT ILLNESS
[de-identified] : AUSTIN SILVERIO is a 74 year old woman with PMH of pulmonary HTN, HTN, HLD, CKD, RA, LUCIANO, CHF, who presents for Hematology follow up of unprovoked PE.  She saw her primary doctor (Dr. Lorenzo) on 2/22/24 with complaints of RLE swelling and pain. Lower extremity Dopplers showed a non-occlusive DVT in the right common femoral, femoral, and popliteal veins. She was sent to the ED for further evaluation and was started on a heparin drip. CT-A chest showed chronic thromboembolic disease involving lateral segment right middle lobe pulmonary arteries. TTE was normal. Repeat Dopplers showed a DVT extending from the right external iliac vein to the calf, and the thrombus in the right common femoral vein appeared mobile. Vascular Surgery performed a thrombectomy on 2/27/24. She was transitioned to therapeutic Eliquis and discharged home.  She is UTD with age-appropriate cancer screening: mammogram 1/9/24- unremarkable, colonoscopy 8/22/23- unremarkable. Dr. Lorenzo performed a thrombophilia work up outpatient: normal APLS labs, no Factor V Leiden mutation, no prothrombin gene mutation, normal protein C and S. Homocysteine level was elevated (34.5).  Interval History: - She returns today for follow up. She has completed over 6 months of anticoagulation with therapeutic Eliquis. She denies any bleeding complications. - She has a recent endovenous ablation on 8/8/25 in the RLE for vascular insufficiency. She is planned for a LLE procedure tomorrow.  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

## 2024-09-04 NOTE — ASSESSMENT
[FreeTextEntry1] : AUSTIN SILVERIO is a 74 year old woman with PMH of pulmonary HTN, HTN, HLD, CKD, RA, LUCIANO, CHF, who presents for Hematology follow up of unprovoked PE.  # Unprovoked VTE: She saw her primary doctor (Dr. Lorenzo) on 2/22/24 with complaints of RLE swelling and pain. Lower extremity Dopplers showed a non-occlusive DVT in the right common femoral, femoral, and popliteal veins. CT-A chest showed chronic thromboembolic disease involving lateral segment right middle lobe pulmonary arteries. TTE was normal. Repeat Dopplers showed a DVT extending from the right external iliac vein to the calf, and the thrombus in the right common femoral vein appeared mobile. Vascular Surgery performed a thrombectomy on 2/27/24. She is UTD with age-appropriate cancer screening. Thrombophilia work up was overall normal. 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 - She has completed over 6months of Eliquis 5 mg BID.  Will switch to preventative dosing at 2.5 mg BID and plan to continue indefinitely as long as she is tolerating it.  - Patient should return to clinic in 6 months

## 2024-09-04 NOTE — PHYSICAL EXAM
[Restricted in physically strenuous activity but ambulatory and able to carry out work of a light or sedentary nature] : Status 1- Restricted in physically strenuous activity but ambulatory and able to carry out work of a light or sedentary nature, e.g., light house work, office work [Normal] : affect appropriate [de-identified] : trace right lower extremity edema

## 2024-09-05 ENCOUNTER — APPOINTMENT (OUTPATIENT)
Dept: VASCULAR SURGERY | Facility: CLINIC | Age: 74
End: 2024-09-05
Payer: MEDICARE

## 2024-09-05 PROCEDURE — 36475 ENDOVENOUS RF 1ST VEIN: CPT | Mod: LT

## 2024-09-05 NOTE — PROCEDURE
[FreeTextEntry1] : left GSV RFA [FreeTextEntry3] : Procedural safety checklist and time out completed: Confirmed patient identification (Patient Name, , and/or medical record number including, when possible, affirmation by patient or parent/family/other). Confirmed procedure with the patient. Consent present, accurate and signed. Confirmed special equipment and supplies are present. Sterility confirmed. Position verified. Site/ side is marked and visible and confirmed. Procedure confirmed by consent. Accurate consent including side and site. Review of medical records, including venous ultrasound, noting correct procedure including site and side. MD/PA verifies presence and review of imaging studies and or written report of imaging studies. Agreement on the procedure to be performed. Time out completed. All of the above has been confirmed by the team. All patient-specific concerns have been addressed.   Indication: left lower extremity varicose veins with inflammation, leg pain, leg swelling, and leg cramping.  Venous insufficiency/ reflux.   Procedure: radiofrequency ablation of the left great saphenous vein.   Ms. AUSTIN ROMERO is a 74 year old F with a history of left lower extremity varicose veins previously seen in the office.  Ultrasound examination demonstrated venous insufficiency. A trial of compression stockings, exercise, elevation, and pain medication was attempted without relief and definitive treatment with radiofrequency ablation was offered.   The patient has come for radiofrequency ablation treatment of the left great saphenous vein. I have discussed the risks of the procedure at length with the patient. The risks discussed were inclusive of but not limited to infection, irritation at the site of infiltration of local anesthesia, and also rare risk of deep venous thrombosis and pulmonary emboli. The patient agrees to proceed with the procedure. The patient was escorted into the procedure room and a time out called. The entire limb was prepped and draped in sterile fashion. The RF fiber was placed on the sterile field and connected by a sterile cable. Actuation, temperature, and impedance testing were performed to ensure that all components were connected and operating properly. The patient was placed on the procedure table and local anesthesia was instilled in the skin overlying the access site. Under ultrasound guidance, the vein was punctured with a micropuncture needle, using the anterior wall technique. A guide wire was now introduced through the needle, and the needle was then exchanged over the guide wire for a _6F sheath. The guide wire was removed, and the RF probe was then placed into the saphenous vein through the sheath and position confirmed using ultrasound guidance. After the RF probe position was verified by ultrasound, tumescent anesthesia consisting of normal saline and1% lidocaine was infiltrated, under ultrasound guidance, precisely into the perivenous compartment along the entire length of the vein until a halo of fluid was noted around the vein. After RF probe position was again confirmed with ultrasound imaging, RF energy was applied. The probe was gradually and carefully withdrawn at a rate of 6.5cm/20seconds.   6 cycles of RF performed using the 8 cm probe. Total treatment time was 120 seconds. The total volume injected was 350 cc. Treatment length was 45.5 cm and The probe is 3.6 cm from the SFJ.   Estimated Blood Loss: minimal. Repeat ultrasound of the treated vein was performed confirming successful treatment. The catheter and sheath were withdrawn, and hemostasis established with direct pressure. After assuring hemostasis, a sterile 4x4 was placed on the access site and an ACE compression wrap was applied. Patient tolerated procedure well. Patient was given post-procedure instructions and follow up appointment was scheduled.

## 2024-09-11 ENCOUNTER — RX RENEWAL (OUTPATIENT)
Age: 74
End: 2024-09-11

## 2024-09-11 RX ORDER — FUROSEMIDE 20 MG/1
20 TABLET ORAL
Qty: 90 | Refills: 1 | Status: ACTIVE | COMMUNITY
Start: 2024-09-11 | End: 1900-01-01

## 2024-09-12 ENCOUNTER — APPOINTMENT (OUTPATIENT)
Dept: VASCULAR SURGERY | Facility: CLINIC | Age: 74
End: 2024-09-12

## 2024-09-12 PROCEDURE — 93971 EXTREMITY STUDY: CPT | Mod: LT

## 2024-09-27 ENCOUNTER — APPOINTMENT (OUTPATIENT)
Dept: CARDIOLOGY | Facility: CLINIC | Age: 74
End: 2024-09-27
Payer: MEDICARE

## 2024-09-27 VITALS
WEIGHT: 250 LBS | BODY MASS INDEX: 40.18 KG/M2 | OXYGEN SATURATION: 96 % | TEMPERATURE: 97.6 F | HEART RATE: 52 BPM | HEIGHT: 66 IN

## 2024-09-27 VITALS — DIASTOLIC BLOOD PRESSURE: 50 MMHG | SYSTOLIC BLOOD PRESSURE: 124 MMHG

## 2024-09-27 DIAGNOSIS — Z12.11 ENCOUNTER FOR SCREENING FOR MALIGNANT NEOPLASM OF COLON: ICD-10-CM

## 2024-09-27 DIAGNOSIS — I99.8 OTHER DISORDER OF CIRCULATORY SYSTEM: ICD-10-CM

## 2024-09-27 DIAGNOSIS — I74.9 EMBOLISM AND THROMBOSIS OF UNSPECIFIED ARTERY: ICD-10-CM

## 2024-09-27 DIAGNOSIS — Z23 ENCOUNTER FOR IMMUNIZATION: ICD-10-CM

## 2024-09-27 DIAGNOSIS — I35.1 NONRHEUMATIC AORTIC (VALVE) INSUFFICIENCY: ICD-10-CM

## 2024-09-27 DIAGNOSIS — M06.9 RHEUMATOID ARTHRITIS, UNSPECIFIED: ICD-10-CM

## 2024-09-27 DIAGNOSIS — Z00.00 ENCOUNTER FOR GENERAL ADULT MEDICAL EXAMINATION W/OUT ABNORMAL FINDINGS: ICD-10-CM

## 2024-09-27 DIAGNOSIS — R06.00 DYSPNEA, UNSPECIFIED: ICD-10-CM

## 2024-09-27 DIAGNOSIS — E78.5 HYPERLIPIDEMIA, UNSPECIFIED: ICD-10-CM

## 2024-09-27 DIAGNOSIS — E66.01 MORBID (SEVERE) OBESITY DUE TO EXCESS CALORIES: ICD-10-CM

## 2024-09-27 DIAGNOSIS — Z71.85 ENCOUNTER FOR IMMUNIZATION SAFETY COUNSELING: ICD-10-CM

## 2024-09-27 DIAGNOSIS — R73.03 PREDIABETES.: ICD-10-CM

## 2024-09-27 DIAGNOSIS — N18.9 CHRONIC KIDNEY DISEASE, UNSPECIFIED: ICD-10-CM

## 2024-09-27 DIAGNOSIS — I10 ESSENTIAL (PRIMARY) HYPERTENSION: ICD-10-CM

## 2024-09-27 DIAGNOSIS — Z12.39 ENCOUNTER FOR OTHER SCREENING FOR MALIGNANT NEOPLASM OF BREAST: ICD-10-CM

## 2024-09-27 PROCEDURE — 99397 PER PM REEVAL EST PAT 65+ YR: CPT | Mod: 25

## 2024-09-27 PROCEDURE — 90662 IIV NO PRSV INCREASED AG IM: CPT

## 2024-09-27 PROCEDURE — G0008: CPT

## 2024-09-27 PROCEDURE — 93000 ELECTROCARDIOGRAM COMPLETE: CPT

## 2024-09-27 NOTE — HISTORY OF PRESENT ILLNESS
[FreeTextEntry1] : AUSTIN is a 74 year F w/MHx of PE/DVT, Vascular Insufficiency, CKD, HLD, HTN, PreDM, Obesity, RA who presents for annual wellness. Last OV 5/17/2024. Denies chest pain, palpitations, edema, cough, wheezing, SOB/SUGGS, vision changes, HA, dizziness, syncope, memory disturbance, mood changes/anhedonia/depression/anxiety, diaphoresis, n/v/d/c/reflux, urinary symptoms, fever, chills, infection/UTI SXS, fatigue, sleep disturbance, myalgia, arthralgia, skin changes/lesions, weight changes, changes in medications, recent trauma, surgery, hospitalization.

## 2024-09-27 NOTE — PHYSICAL EXAM
[Well Developed] : well developed [Well Nourished] : well nourished [No Acute Distress] : no acute distress [Obese] : obese [Normal Conjunctiva] : normal conjunctiva [Normal Venous Pressure] : normal venous pressure [No Carotid Bruit] : no carotid bruit [Normal S1, S2] : normal S1, S2 [No Murmur] : no murmur [No Rub] : no rub [No Gallop] : no gallop [Clear Lung Fields] : clear lung fields [Good Air Entry] : good air entry [No Respiratory Distress] : no respiratory distress  [Soft] : abdomen soft [Non Tender] : non-tender [No Masses/organomegaly] : no masses/organomegaly [Normal Bowel Sounds] : normal bowel sounds [Normal Gait] : normal gait [No Edema] : no edema [No Cyanosis] : no cyanosis [No Clubbing] : no clubbing [No Varicosities] : no varicosities [No Rash] : no rash [No Skin Lesions] : no skin lesions [Moves all extremities] : moves all extremities [No Focal Deficits] : no focal deficits [Normal Speech] : normal speech [Alert and Oriented] : alert and oriented [Normal memory] : normal memory [Murmur] : murmur [de-identified] : Grade 1/6 systolic murmur.

## 2024-10-02 ENCOUNTER — APPOINTMENT (OUTPATIENT)
Dept: CT IMAGING | Facility: IMAGING CENTER | Age: 74
End: 2024-10-02
Payer: MEDICARE

## 2024-10-02 ENCOUNTER — NON-APPOINTMENT (OUTPATIENT)
Age: 74
End: 2024-10-02

## 2024-10-02 ENCOUNTER — RESULT REVIEW (OUTPATIENT)
Age: 74
End: 2024-10-02

## 2024-10-02 PROCEDURE — 71275 CT ANGIOGRAPHY CHEST: CPT | Mod: 26

## 2024-10-04 ENCOUNTER — NON-APPOINTMENT (OUTPATIENT)
Age: 74
End: 2024-10-04

## 2024-10-04 LAB
25(OH)D3 SERPL-MCNC: 58.1 NG/ML
ALBUMIN SERPL ELPH-MCNC: 4.3 G/DL
ALP BLD-CCNC: 94 U/L
ALT SERPL-CCNC: 13 U/L
ANION GAP SERPL CALC-SCNC: 16 MMOL/L
AST SERPL-CCNC: 14 U/L
BASOPHILS # BLD AUTO: 0.04 K/UL
BASOPHILS NFR BLD AUTO: 0.4 %
BILIRUB DIRECT SERPL-MCNC: 0.2 MG/DL
BILIRUB INDIRECT SERPL-MCNC: 0.4 MG/DL
BILIRUB SERPL-MCNC: 0.6 MG/DL
BUN SERPL-MCNC: 25 MG/DL
CALCIUM SERPL-MCNC: 10.3 MG/DL
CHLORIDE SERPL-SCNC: 102 MMOL/L
CHOLEST SERPL-MCNC: 161 MG/DL
CK SERPL-CCNC: 62 U/L
CO2 SERPL-SCNC: 23 MMOL/L
CREAT SERPL-MCNC: 1.36 MG/DL
EGFR: 40 ML/MIN/1.73M2
EOSINOPHIL # BLD AUTO: 0.29 K/UL
EOSINOPHIL NFR BLD AUTO: 2.9 %
ESTIMATED AVERAGE GLUCOSE: 108 MG/DL
FOLATE SERPL-MCNC: 7.5 NG/ML
GLUCOSE SERPL-MCNC: 95 MG/DL
HBA1C MFR BLD HPLC: 5.4 %
HCT VFR BLD CALC: 41.3 %
HDLC SERPL-MCNC: 53 MG/DL
HGB BLD-MCNC: 13.1 G/DL
IMM GRANULOCYTES NFR BLD AUTO: 0.3 %
LDLC SERPL CALC-MCNC: 94 MG/DL
LYMPHOCYTES # BLD AUTO: 2.4 K/UL
LYMPHOCYTES NFR BLD AUTO: 23.8 %
MAGNESIUM SERPL-MCNC: 2 MG/DL
MAN DIFF?: NORMAL
MCHC RBC-ENTMCNC: 31 PG
MCHC RBC-ENTMCNC: 31.7 GM/DL
MCV RBC AUTO: 97.9 FL
MONOCYTES # BLD AUTO: 0.58 K/UL
MONOCYTES NFR BLD AUTO: 5.8 %
NEUTROPHILS # BLD AUTO: 6.74 K/UL
NEUTROPHILS NFR BLD AUTO: 66.8 %
NONHDLC SERPL-MCNC: 107 MG/DL
PHOSPHATE SERPL-MCNC: 2.3 MG/DL
PLATELET # BLD AUTO: 268 K/UL
POTASSIUM SERPL-SCNC: 4.5 MMOL/L
PROT SERPL-MCNC: 7.5 G/DL
RBC # BLD: 4.22 M/UL
RBC # FLD: 14.1 %
SODIUM SERPL-SCNC: 141 MMOL/L
T4 FREE SERPL-MCNC: 1.6 NG/DL
TRIGL SERPL-MCNC: 71 MG/DL
TSH SERPL-ACNC: 1.06 UIU/ML
URATE SERPL-MCNC: 5 MG/DL
VIT B12 SERPL-MCNC: 406 PG/ML
WBC # FLD AUTO: 10.08 K/UL